# Patient Record
Sex: MALE | Race: ASIAN | NOT HISPANIC OR LATINO | Employment: FULL TIME | URBAN - METROPOLITAN AREA
[De-identification: names, ages, dates, MRNs, and addresses within clinical notes are randomized per-mention and may not be internally consistent; named-entity substitution may affect disease eponyms.]

---

## 2022-05-24 ENCOUNTER — OFFICE VISIT (OUTPATIENT)
Dept: INTERNAL MEDICINE | Facility: CLINIC | Age: 27
End: 2022-05-24
Payer: OTHER GOVERNMENT

## 2022-05-24 VITALS
BODY MASS INDEX: 28.24 KG/M2 | WEIGHT: 186.31 LBS | HEIGHT: 68 IN | HEART RATE: 80 BPM | TEMPERATURE: 97 F | SYSTOLIC BLOOD PRESSURE: 122 MMHG | OXYGEN SATURATION: 98 % | DIASTOLIC BLOOD PRESSURE: 88 MMHG

## 2022-05-24 DIAGNOSIS — M62.89 MUSCLE TIGHTNESS: ICD-10-CM

## 2022-05-24 DIAGNOSIS — Z87.438 HISTORY OF TORSION OF TESTIS: ICD-10-CM

## 2022-05-24 DIAGNOSIS — N50.811 RIGHT TESTICULAR PAIN: ICD-10-CM

## 2022-05-24 DIAGNOSIS — M54.50 CHRONIC RIGHT-SIDED LOW BACK PAIN WITHOUT SCIATICA: Primary | ICD-10-CM

## 2022-05-24 DIAGNOSIS — G89.29 CHRONIC RIGHT-SIDED LOW BACK PAIN WITHOUT SCIATICA: Primary | ICD-10-CM

## 2022-05-24 PROCEDURE — 99204 PR OFFICE/OUTPT VISIT, NEW, LEVL IV, 45-59 MIN: ICD-10-PCS | Mod: S$PBB,,, | Performed by: FAMILY MEDICINE

## 2022-05-24 PROCEDURE — 99204 OFFICE O/P NEW MOD 45 MIN: CPT | Mod: S$PBB,,, | Performed by: FAMILY MEDICINE

## 2022-05-24 PROCEDURE — 99999 PR PBB SHADOW E&M-NEW PATIENT-LVL V: CPT | Mod: PBBFAC,,, | Performed by: FAMILY MEDICINE

## 2022-05-24 PROCEDURE — 99205 OFFICE O/P NEW HI 60 MIN: CPT | Mod: PBBFAC | Performed by: FAMILY MEDICINE

## 2022-05-24 PROCEDURE — 99999 PR PBB SHADOW E&M-NEW PATIENT-LVL V: ICD-10-PCS | Mod: PBBFAC,,, | Performed by: FAMILY MEDICINE

## 2022-05-24 RX ORDER — CELECOXIB 200 MG/1
200 CAPSULE ORAL DAILY
Qty: 90 CAPSULE | Refills: 0 | Status: SHIPPED | OUTPATIENT
Start: 2022-05-24 | End: 2022-08-02

## 2022-05-24 RX ORDER — CYCLOBENZAPRINE HCL 10 MG
10 TABLET ORAL 3 TIMES DAILY
Qty: 30 TABLET | Refills: 0 | Status: SHIPPED | OUTPATIENT
Start: 2022-05-24 | End: 2022-08-02

## 2022-05-24 NOTE — PROGRESS NOTES
Subjective:   Patient ID: Cyril Carlton is a 27 y.o. male.  Chief Complaint:  Establish Care    Presents for establishing care  Reports no significant past medical history  Currently not on any medications    Reports chronic right sided back tightness  Notes right foot fracture in freshman year of high school  Fracture was not surgically corrected  Reports being informed by other providers that prior immobilization led to foot being 2-5 degrees off axis  Believes that foot issues contribute to right side back tightness and pain when squatting  Denies issues with ADLs  Reports mild sporadic tingling on right posterior leg  Reports trying ibuprofen, tylenol and aleve without relief  Denies using any muscle relaxants or receiving other treatments for relief  Has not attempted physical therapy but is amenable    Also is concerned about right testicular torsion  Reports in November 2021 single episode of sharp right sided testicular pain at night  Reports waking up to severe pain with right testicle being flipped about vertical axis  Notes that pain was relieved after manual detorsion  Since that episode, reports intermittent brief episodes of testicular pain  Denies prior history of testicular torsion, hernias, varicocele or hydrocele  Denies hematuria or bloody penile discharge    No other concerns noted today      Current Outpatient Medications:     celecoxib (CELEBREX) 200 MG capsule, Take 1 capsule (200 mg total) by mouth once daily., Disp: 90 capsule, Rfl: 0    cyclobenzaprine (FLEXERIL) 10 MG tablet, Take 1 tablet (10 mg total) by mouth 3 (three) times daily., Disp: 30 tablet, Rfl: 0    Review of Systems   Constitutional: Negative for activity change, appetite change, chills, fatigue, fever and unexpected weight change.   HENT: Negative for congestion, ear discharge, ear pain, nosebleeds, postnasal drip, rhinorrhea, sinus pressure, sinus pain, sore throat and trouble swallowing.    Eyes: Negative for photophobia,  "pain, discharge and visual disturbance.   Respiratory: Negative for cough, chest tightness and shortness of breath.    Cardiovascular: Negative for chest pain.   Gastrointestinal: Negative for abdominal pain, blood in stool, constipation, diarrhea, nausea and vomiting.   Endocrine: Negative for polydipsia, polyphagia and polyuria.   Genitourinary: Positive for testicular pain. Negative for decreased urine volume, difficulty urinating, dysuria, enuresis, flank pain, frequency, genital sores, hematuria, penile discharge, penile pain, penile swelling, scrotal swelling and urgency.   Musculoskeletal: Positive for back pain and myalgias. Negative for arthralgias, gait problem and neck pain.   Skin: Negative for color change and rash.   Neurological: Negative for dizziness, tremors, seizures, facial asymmetry, weakness, light-headedness, numbness and headaches.   Psychiatric/Behavioral: Negative for dysphoric mood. The patient is not nervous/anxious.      Objective:   /88   Pulse 80   Temp 97.2 °F (36.2 °C)   Ht 5' 8" (1.727 m)   Wt 84.5 kg (186 lb 4.6 oz)   SpO2 98%   BMI 28.33 kg/m²     Physical Exam  Vitals reviewed. Exam conducted with a chaperone present.   Constitutional:       General: He is not in acute distress.     Appearance: Normal appearance. He is not ill-appearing.   Abdominal:      Hernia: There is no hernia in the left inguinal area or right inguinal area.   Genitourinary:     Penis: Normal.       Testes: Normal.         Right: Mass, tenderness, swelling, testicular hydrocele or varicocele not present.         Left: Mass, tenderness, swelling, testicular hydrocele or varicocele not present. Left testis is descended.      Epididymis:      Right: Normal.      Left: Normal.   Musculoskeletal:         General: Normal range of motion.      Cervical back: Normal range of motion.      Lumbar back: No spasms, tenderness or bony tenderness. Normal range of motion. Negative right straight leg raise test " and negative left straight leg raise test. No scoliosis.   Lymphadenopathy:      Lower Body: No right inguinal adenopathy. No left inguinal adenopathy.   Skin:     General: Skin is warm and dry.   Neurological:      Mental Status: He is alert.      Gait: Gait normal.       Assessment:       ICD-10-CM ICD-9-CM   1. Chronic right-sided low back pain without sciatica  M54.50 724.2    G89.29 338.29   2. Muscle tightness  M62.89 728.9   3. Right testicular pain  N50.811 608.9   4. History of torsion of testis  Z87.438 V13.29     Plan:   Chronic right-sided low back pain without sciatica  Muscle tightness  -     Ambulatory referral/consult to Physical/Occupational Therapy; Future; Expected date: 05/31/2022  -     celecoxib (CELEBREX) 200 MG capsule; Take 1 capsule (200 mg total) by mouth once daily.  Dispense: 90 capsule; Refill: 0  -     cyclobenzaprine (FLEXERIL) 10 MG tablet; Take 1 tablet (10 mg total) by mouth 3 (three) times daily.  Dispense: 30 tablet; Refill: 0  Symptoms related to muscle tightness but unlikely spasm related  Start Celebrex 200 mg daily  Start Flexeril 10 mg three times daily  Schedule referral to PT  Reassess in 4 weeks    Right testicular pain  History of torsion of testis  -     Ambulatory referral/consult to Urology; Future; Expected date: 05/31/2022  No abnormality reproducible on exam  Referral to urology    Return to clinic 4 weeks    Bobby Cabrera, MS4  5/24/22    I hereby acknowledge that I am relying upon documentation authored by a medical student working under my supervision and further I hereby attest that I have verified the student documentation or findings by personally performing the physical exam and medical decision making activities of the Evaluation and Management service to be billed.  Lon Thomas     45 minutes of total time spent on the encounter, which includes face to face time and non-face to face time preparing to see the patient (eg, review of tests), Obtaining  and/or reviewing separately obtained history, documenting clinical information in the electronic or other health record, independently interpreting results (not separately reported) and communicating results to the patient/family/caregiver, or Care coordination (not separately reported).

## 2022-06-01 ENCOUNTER — CLINICAL SUPPORT (OUTPATIENT)
Dept: REHABILITATION | Facility: HOSPITAL | Age: 27
End: 2022-06-01
Payer: OTHER GOVERNMENT

## 2022-06-01 DIAGNOSIS — M54.50 CHRONIC RIGHT-SIDED LOW BACK PAIN WITHOUT SCIATICA: ICD-10-CM

## 2022-06-01 DIAGNOSIS — G89.29 CHRONIC RIGHT-SIDED LOW BACK PAIN WITHOUT SCIATICA: ICD-10-CM

## 2022-06-01 DIAGNOSIS — M62.89 MUSCLE TIGHTNESS: ICD-10-CM

## 2022-06-01 PROCEDURE — 97110 THERAPEUTIC EXERCISES: CPT

## 2022-06-01 PROCEDURE — 97161 PT EVAL LOW COMPLEX 20 MIN: CPT

## 2022-06-01 NOTE — PLAN OF CARE
[facility]  Physical Therapy Initial Evaluation    Name: Cyril Carlton  Clinic Number: [id number]    Therapy Diagnosis:  Encounter Diagnoses   Name Primary?    Chronic right-sided low back pain without sciatica     Muscle tightness       Physician: Lon Thomas MD    Physician Orders: PT Eval and Treat  Medical Diagnosis from Referral: Back Pain   Evaluation Date: 6/1/2022  Authorization Period Expiration: 12/31/22  Plan of Care Expiration: 07/31/2022    Progress Update: 06/30/2021  FOTO: 1 / 3    Visit # / Visits authorized: 1 / 1      PRECAUTIONS: Standard Precautions     MD Follow-up: 06/15/2022    Time In: 0700  Time Out: 0745  Total Appointment Time (timed & untimed codes): 30 minutes    SUBJECTIVE   Date of onset:     History of current condition - Cyril is a 27 y.o. male whom reports back discomfort and ankle stiffness. Reports chronic right sided back tightness  Notes right foot fracture in freshman year of high school. Fracture was not surgically corrected. Reports being informed by other providers that prior immobilization led to foot being 2-5 degrees off axis. Believes that foot issues contribute to right side back tightness and pain when squatting     Prior Therapy: N/A  Social History: Pt lives with their family   Living Environment: Apt    ADLs unable to complete:    Gym/Home Equipment: Yes   Occupation: Pt is [employer]   Prior Level of Function: Independent with all ADLs  Current Level of Function: 75% of PLOF    Pain:  Current 1 /10, worst 7 /10, best 0 /10   Location: Low Back   Description: Aching, Grabbing, Tight and Deep  Aggravating Factors: Squatting   Easing Factors: Rest     ______________________________________________________  Medical History:   No past medical history on file.    Surgical History:   Cyril Carlton  has no past surgical history on file.    Medications:   Cyril has a current medication list which includes the following prescription(s):  celecoxib and cyclobenzaprine.    Allergies:   Review of patient's allergies indicates:  No Known Allergies     OBJECTIVE     (X= Not Tested)     RANGE OF MOTION:    Lumbar AROM/PROM Spine  Right  6/1/2022 Left    6/1/2022 Pain/Dysfunction with Movement Goal   Lumbar Flexion (60) 75%   100%  Initial:    Lumbar Extension (30) 75%   100%  Initial:    Lumbar Side Bending (25) 75% 75%  100%  Initial:    Lumbar Rotation 100% 100%  Pain Free  Initial:      Hip AROM/PROM Right  6/1/2022 Left  6/1/2022 Pain/Dysfunction with Movement Goal   Hip IR (45) 5 5  40  Initial:    Hip ER (45) 30 30  40  Initial:      NEURO SCREEN:    Neuro Testing Right  6/1/2022 Left  6/1/2022 Details   Reflexes  x x    Dermatomes Normal Normal    Myotome Deficits Normal Normal    Tone x x    Spasticity x x        FUNCTIONAL SCREEN:    Upper Extremity ROM Details STRENGTH Details   Shoulder WNL No pain  Grossly 5/5    Elbow WNL No Pain  Grossly 5/5    Hand/Wrist WNL No pain  Grossly 5/5      Lower Extremity STRENGTH Details   Hip Grossly 4/5    Knee Grossly /5    Foot/Ankle Grossly 4/5      Abdominal Strength STRENGTH Details   Pelvic Tilt 3/5    Double Leg Drop x    Plank x        JOINT MOBILITY:     Joint Motion Tested Right  (spine)  6/1/2022 Left   6/1/2022 Goal   Cervical Mobility: C1-2   Normal B   Cervical Moiblity: C3-7   Normal B   Thoracic Mobility: T1-12   Normal B   Lumbar Mobility: L1-5   Normal B   Sacral Mobility   Normal B       Sensation:  Sensation is intact to light touch    Palpation: Increased tone and tenderness noted with palpation to: right ankle     Gait Analysis: The patient ambulated with the following assistive device: none; the pt presents with the following gait abnormalities: decreased step length, jessica  Movement Analysis:   Functional Test  Outcome   Double Leg Squat Lacking dorsiflexion with lateral shift to left side. Knee valgus on right side   Single Leg Step Down [place] sign on right leg      Balance:  Balance:    RIGHT  6/1/2022 LEFT  6/1/2022 Goal   Closed x  60 seconds  Initial: R (), L ()   Tandem x x 20 seconds  Initial: R (), L ()   Single Leg x x 20 seconds  Initial: R (), L ()       FUNCTION:     CMS Impairment/Limitation/Restriction for FOTO Lumbar Survey    Therapist reviewed FOTO scores for Cyril Carlton on 6/1/2022.   FOTO documents entered into [employer] - see Media section.    Limitation Score: 26%         TREATMENT     Total Treatment time separate from Evaluation: (20) minutes    Cyril received therapeutic exercises to develop strength, endurance, ROM, flexibility, and posture for (10) minutes including: x = exercises performed     TherEx 6/1/2022    Recumbent Bike  next                     Plan for Next Visit: Address right ankle stiffness and glute med motor control in squat position      Home Exercises and Patient Education Provided    Education/Self-Care provided: (included in treatment) minutes    Patient educated on the impairments noted above and the effects of physical therapy intervention to improve overall condition and QOL.    Patient was educated on all the above exercise prior/during/after for proper posture, positioning, and execution for safe performance with home exercise program.   Exercise Prescription:   o 6/1/2022: EVAL- Low     Written Home Exercises Provided: yes. Prefers: Electronic Copies  Exercises were reviewed and Cyril was able to demonstrate them prior to the end of the session.  Cyril demonstrated good understanding of the education provided.     See EMR under Patient Instructions for exercises provided during therapy sessions.    ASSESSMENT     Cyril is a 27 y.o. male referred to outpatient Physical Therapy with a medical diagnosis of right ankle stiffness and QL tightness.  Cyril presents with clinical signs and symptoms that support this diagnosis with decreased lumbar/ankle ROM, decreased lower extremity strength,  lower extremity neural tension, and impaired  "functional mobility.  The above impairments will be addressed through manual therapy techniques, therapeutic exercises, functional training, and modalities as necessary. Patient was treated and educated on exercises for home program, progression of therapy, and benefits of therapy to achieve full functional mobility.     Pt prognosis is Excellent.   Pt will benefit from skilled outpatient Physical Therapy to address the deficits stated above and in the chart below, provide pt/family education, and to maximize pt's level of independence.     Plan of care discussed with patient: Yes  Pt's spiritual, cultural and educational needs considered and patient is agreeable to the plan of care and goals as stated below:     Anticipated Barriers for therapy: occupation    Medical Necessity is demonstrated by the following  History  Co-morbidities and personal factors that may impact the plan of care Co-morbidities:       Personal Factors:   no deficits     low   Examination  Body Structures and Functions, activity limitations and participation restrictions that may impact the plan of care Body Regions:   back  lower extremities    Body Systems:    gross symmetry  ROM  strength  gross coordinated movement  balance  motor control    Participation Restrictions:   See above in "Current Level of Function"     Activity limitations:   Learning and applying knowledge  no deficits    General Tasks and Commands  no deficits    Communication  no deficits    Mobility  lifting and carrying objects    Self care  dressing    Domestic Life  assisting others    Interactions/Relationships  no deficits    Life Areas  no deficits    Community and Social Life  community life  recreation and leisure         low   Clinical Presentation stable and uncomplicated low   Decision Making/ Complexity Score: low       GOALS:  SHORT TERM GOALS: 4 weeks, (06/30/22) 6/1/2022   1. Recent signs and systems trend is improving in order to progress towards LTG's.  "   2. Patient will be independent with HEP in order to further progress and return to maximal function.    3. Pain rating at Worst: 5/10 in order to progress towards increased independence with activity.    4. Patient will be able to correct postural deviations in sitting and standing, to decrease pain and promote postural awareness for injury prevention.       LONG TERM GOALS: 8 weeks, (07/30/22) 6/1/2022   1. Patient will return to normal ADL, recreational, and work related activities with less pain and limitation.     2. Patient will improve AROM to stated goals in order to return to maximal functional potential.     3. Patient will improve Strength to stated goals of appropriate musculature in order to improve functional independence.     4. Pain Rating at Best: 1/10 to improve Quality of Life.     5. Patient will meet predicted functional outcome (FOTO) score: 18% to increase self-worth & perceived functional ability.    6. Patient will have met/partially met personal goal of: being able to squat > 225lbs without back discomfort         PLAN   Plan of care Certification: 6/1/2022 to 07/30/2022    Outpatient Physical Therapy 2 times weekly for 8 weeks to include any combination of the following interventions: virtual visits, dry needling, modalities, electrical stimulation (IFC, Pre-Mod, Attended with Functional Dry Needling), Manual Therapy, Moist Heat/ Ice, Neuromuscular Re-ed, Patient Education, Self Care, Therapeutic Exercise, Functional Training and Therapeutic Activites     Thank you for this referral.    These services are reasonable and necessary for the conditions set forth above while under my care.    Shayne Brody, PT, DPT

## 2022-06-07 ENCOUNTER — CLINICAL SUPPORT (OUTPATIENT)
Dept: REHABILITATION | Facility: HOSPITAL | Age: 27
End: 2022-06-07
Payer: OTHER GOVERNMENT

## 2022-06-07 DIAGNOSIS — M62.89 MUSCLE TIGHTNESS: Primary | ICD-10-CM

## 2022-06-07 PROCEDURE — 97110 THERAPEUTIC EXERCISES: CPT

## 2022-06-07 NOTE — PROGRESS NOTES
[facility]   Physical Therapy Treatment Note     Name: Cyril Carlton  Clinic Number: [id number]    Therapy Diagnosis:   Encounter Diagnosis   Name Primary?    Muscle tightness Yes     Physician: Lon Thomas MD    Visit Date: 6/7/2022    Physician Orders: PT Eval and Treat  Medical Diagnosis from Referral: Back Pain   Evaluation Date: 6/1/2022  Authorization Period Expiration: 9/19/22  Plan of Care Expiration: 07/31/2022                          Progress Update: 06/30/2021                      FOTO: 1 / 3    Visit # / Visits authorized: 1/20 (+1 eval)     Time In: 4:20pm  Time Out: 5:25pm  Total Billable Time: 56 minutes    Precautions: Standard    SUBJECTIVE     Pt reports: He has been working on how he is lifting and he has been a little more sore in the back and hips but overall he feels good.  He was compliant with home exercise program.  Response to previous treatment: good  Functional change: none noted    Pain:  Current 1 /10, worst 7 /10, best 0 /10   Location: Low Back     OBJECTIVE     Objective Measures updated at progress report unless specified.     TREATMENT     Cyril received the treatments listed below:  All exercises performed bilaterally     Cyril received therapeutic exercises to develop strength, endurance, ROM, flexibility, posture and core stabilization for 56 minutes including:  Elliptical 5 minutes level 5   Patty Pose and Half Kneeling Dorsifleion Stretch [id number]  Lateral Walks & Monster Walks green band 3x10  Step Taps 6inch 3x10  Bridge on swiss ball 3x10  Side Plank with Abduction 3x5   Trunk Rot Iso with Walkouts 3x5 30# each way   Single Leg MOBO with 5# KB 30x even and odd   Bird/Dog 2x10   Single Leg RDL to cone 2x10  Lumbar Flexion Stretch 3 way with swiss ball [id number] each way     Patient Education and Home Exercises     Home Exercises and Patient Education Provided     Education/Self-Care provided: (included in treatment) minutes   · Patient  educated on the impairments noted above and the effects of physical therapy intervention to improve overall condition and QOL.   · Patient was educated on all the above exercise prior/during/after for proper posture, positioning, and execution for safe performance with home exercise program.  · Exercise Prescription:   ? 6/1/2022: EVAL- Low      Written Home Exercises Provided: yes. Prefers: Electronic Copies  Exercises were reviewed and Cyril was able to demonstrate them prior to the end of the session.  Cyril demonstrated good understanding of the education provided.      See EMR under Patient Instructions for exercises provided during therapy sessions.    ASSESSMENT     Movements were added today for single leg stability, muscle control, and core/hip strengthening. Patient did have more fatigue on the right leg compared to the left. The right leg also has more valgus than the left with step taps. He tolerated today's session well. He was advised with being patient with gaining muscle control and not over doing anything at the gym. HE was also educated on doing more single leg and single arm movements to work more on stability.     Cyril Is progressing well towards his goals.   Pt prognosis is Excellent.     Pt will continue to benefit from skilled outpatient physical therapy to address the deficits listed in the problem list box on initial evaluation, provide pt/family education and to maximize pt's level of independence in the home and community environment.     Pt's spiritual, cultural and educational needs considered and pt agreeable to plan of care and goals.     Anticipated barriers to physical therapy: occupation    GOALS:  SHORT TERM GOALS: 4 weeks, (06/30/22) 6/1/2022   1. Recent signs and systems trend is improving in order to progress towards LTG's.     2. Patient will be independent with HEP in order to further progress and return to maximal function.     3. Pain rating at Worst: 5/10 in order to  progress towards increased independence with activity.     4. Patient will be able to correct postural deviations in sitting and standing, to decrease pain and promote postural awareness for injury prevention.         LONG TERM GOALS: 8 weeks, (07/30/22) 6/1/2022   1. Patient will return to normal ADL, recreational, and work related activities with less pain and limitation.      2. Patient will improve AROM to stated goals in order to return to maximal functional potential.      3. Patient will improve Strength to stated goals of appropriate musculature in order to improve functional independence.      4. Pain Rating at Best: 1/10 to improve Quality of Life.      5. Patient will meet predicted functional outcome (FOTO) score: 18% to increase self-worth & perceived functional ability.     6. Patient will have met/partially met personal goal of: being able to squat > 225lbs without back discomfort         PLAN     Continue with physical therapy as planned.     Plan of care Certification: 6/1/2022 to 07/30/2022    Lon Vizcarra, PT, DPT

## 2022-06-21 ENCOUNTER — OFFICE VISIT (OUTPATIENT)
Dept: UROLOGY | Facility: CLINIC | Age: 27
End: 2022-06-21
Payer: OTHER GOVERNMENT

## 2022-06-21 VITALS
BODY MASS INDEX: 28.74 KG/M2 | DIASTOLIC BLOOD PRESSURE: 84 MMHG | WEIGHT: 189.63 LBS | HEART RATE: 67 BPM | HEIGHT: 68 IN | SYSTOLIC BLOOD PRESSURE: 122 MMHG

## 2022-06-21 DIAGNOSIS — N50.811 RIGHT TESTICULAR PAIN: ICD-10-CM

## 2022-06-21 DIAGNOSIS — Z87.438 HISTORY OF TORSION OF TESTIS: ICD-10-CM

## 2022-06-21 PROCEDURE — 99999 PR PBB SHADOW E&M-EST. PATIENT-LVL III: ICD-10-PCS | Mod: PBBFAC,,, | Performed by: UROLOGY

## 2022-06-21 PROCEDURE — 99999 PR PBB SHADOW E&M-EST. PATIENT-LVL III: CPT | Mod: PBBFAC,,, | Performed by: UROLOGY

## 2022-06-21 PROCEDURE — 99243 PR OFFICE CONSULTATION,LEVEL III: ICD-10-PCS | Mod: S$PBB,,, | Performed by: UROLOGY

## 2022-06-21 PROCEDURE — 99243 OFF/OP CNSLTJ NEW/EST LOW 30: CPT | Mod: S$PBB,,, | Performed by: UROLOGY

## 2022-06-21 PROCEDURE — 99213 OFFICE O/P EST LOW 20 MIN: CPT | Mod: PBBFAC | Performed by: UROLOGY

## 2022-06-22 ENCOUNTER — CLINICAL SUPPORT (OUTPATIENT)
Dept: REHABILITATION | Facility: HOSPITAL | Age: 27
End: 2022-06-22
Payer: OTHER GOVERNMENT

## 2022-06-22 DIAGNOSIS — M62.89 MUSCLE TIGHTNESS: Primary | ICD-10-CM

## 2022-06-22 PROCEDURE — 97140 MANUAL THERAPY 1/> REGIONS: CPT

## 2022-06-22 PROCEDURE — 97110 THERAPEUTIC EXERCISES: CPT

## 2022-06-22 NOTE — PROGRESS NOTES
[facility]   Physical Therapy Treatment Note     Name: Cyril Carlton  Clinic Number: [id number]    Therapy Diagnosis:   Encounter Diagnosis   Name Primary?    Muscle tightness Yes     Physician: Lon Thomas MD  Visit Date: 6/22/2022  Physician Orders: PT Eval and Treat  Medical Diagnosis from Referral: Back Pain   Evaluation Date: 6/1/2022  Authorization Period Expiration: 9/19/22  Plan of Care Expiration: 07/31/2022                          Progress Update: 06/30/2021                      FOTO: 1 / 3    Visit # / Visits authorized: 2/20 (+1 eval)     Time In: 0815  Time Out: 0910  Total Billable Time: 55 minutes    Precautions: Standard    SUBJECTIVE     Pt reports: overall he feels good. Unable to workout due to traveling schedule  He was compliant with home exercise program.  Response to previous treatment: good  Functional change: none noted    Pain:  Current 1 /10, worst 7 /10, best 0 /10   Location: Low Back     OBJECTIVE     Objective Measures updated at progress report unless specified.     TREATMENT     Cyril received the treatments listed below:  All exercises performed bilaterally     Cyril received therapeutic exercises to develop strength, endurance, ROM, flexibility, posture and core stabilization for 40 minutes including:  Elliptical 5 minutes level 5   Patty Pose and Half Kneeling Dorsifleion Stretch [id number]  Lateral Walks & Monster Walks green band 3x10  Step Taps 6inch 3x10  Bridge on swiss ball 3x10  Side Plank with Abduction 3x5   Tempo squats 3x5    HELD  Trunk Rot Iso with Walkouts 3x5 30# each way   Single Leg MOBO with 5# KB 30x even and odd   Bird/Dog 2x10   Single Leg RDL to cone 2x10  Lumbar Flexion Stretch 3 way with swiss ball 3x0pqve each way       Cyril received the following manual therapy techniques: Joint mobilizations, Manual traction, Myofacial release, Soft tissue Mobilization and Friction Massage were applied to the: hips and QL for 15  minutes, including:  FDN- QL    Patient Education and Home Exercises     Home Exercises and Patient Education Provided     Education/Self-Care provided: (included in treatment) minutes   · Patient educated on the impairments noted above and the effects of physical therapy intervention to improve overall condition and QOL.   · Patient was educated on all the above exercise prior/during/after for proper posture, positioning, and execution for safe performance with home exercise program.  · Exercise Prescription:   ? 6/1/2022: FELICITAS- Low      Written Home Exercises Provided: yes. Prefers: Electronic Copies  Exercises were reviewed and Cyril was able to demonstrate them prior to the end of the session.  Cyril demonstrated good understanding of the education provided.      See EMR under Patient Instructions for exercises provided during therapy sessions.    ASSESSMENT     Cyril Carlton tolerated PT session well with minimal  complaints of pain or discomfort, secondary to poor motor coordination. Objective findings are improving with measurements of ROM and functional mobility.  Therapy exercises were reviewed by revisiting exercises given from previous home exercise program while adding tempo squats.  Handouts were not issued during today's visit. Cyril demonstrated good understanding of new exercises and will continue to progress at home until next follow-up.         Cyril Is progressing well towards his goals.   Pt prognosis is Excellent.     Pt will continue to benefit from skilled outpatient physical therapy to address the deficits listed in the problem list box on initial evaluation, provide pt/family education and to maximize pt's level of independence in the home and community environment.     Pt's spiritual, cultural and educational needs considered and pt agreeable to plan of care and goals.     Anticipated barriers to physical therapy: occupation    GOALS:  SHORT TERM GOALS: 4 weeks, (06/30/22) 6/1/2022    1. Recent signs and systems trend is improving in order to progress towards LTG's.     2. Patient will be independent with HEP in order to further progress and return to maximal function.     3. Pain rating at Worst: 5/10 in order to progress towards increased independence with activity.     4. Patient will be able to correct postural deviations in sitting and standing, to decrease pain and promote postural awareness for injury prevention.         LONG TERM GOALS: 8 weeks, (07/30/22) 6/1/2022   1. Patient will return to normal ADL, recreational, and work related activities with less pain and limitation.      2. Patient will improve AROM to stated goals in order to return to maximal functional potential.      3. Patient will improve Strength to stated goals of appropriate musculature in order to improve functional independence.      4. Pain Rating at Best: 1/10 to improve Quality of Life.      5. Patient will meet predicted functional outcome (FOTO) score: 18% to increase self-worth & perceived functional ability.     6. Patient will have met/partially met personal goal of: being able to squat > 225lbs without back discomfort         PLAN     Continue with physical therapy as planned.     Plan of care Certification: 6/1/2022 to 07/30/2022    Shayne Brody, PT, DPT

## 2022-06-24 ENCOUNTER — HOSPITAL ENCOUNTER (OUTPATIENT)
Dept: RADIOLOGY | Facility: HOSPITAL | Age: 27
Discharge: HOME | End: 2022-06-24
Attending: UROLOGY
Payer: OTHER GOVERNMENT

## 2022-06-24 DIAGNOSIS — N50.811 RIGHT TESTICULAR PAIN: ICD-10-CM

## 2022-06-24 PROCEDURE — 76870 US EXAM SCROTUM: CPT | Mod: TC

## 2022-06-25 NOTE — PROGRESS NOTES
Chief Complaint:  Right testicular pain    HPI:   Mr. Cyril Carlton is a 27 y.o. male that presents today as a referral from Dr. Thomas for right testicular pain.  Patient notes a testicular pain which initially occurred about 1.5yrs ago.  States that he woke up with severe pain in his right testicle and noted that the testicle was a flipped vertically.  States that he manually detorsed.  This improved his pain.  He notes intermittent discomfort now, related to working out.  At worst 2-3/10.  Denies any voiding issues or gross hematuria.  Denies ED.  Denies family history of  cancers.  No recent imaging.  Currently pain-free.      PMH:  Past Medical History:   Diagnosis Date    Patient denies medical problems        PSH:  Past Surgical History:   Procedure Laterality Date    NO PAST SURGERIES         Family History:  Family History   Problem Relation Age of Onset    No Known Problems Mother     No Known Problems Father     No Known Problems Sister     Diabetes Maternal Grandmother     Diabetes Paternal Grandmother     Diabetes Paternal Grandfather        Social History:  Social History     Tobacco Use    Smoking status: Never Smoker    Smokeless tobacco: Never Used   Substance Use Topics    Alcohol use: Yes     Alcohol/week: 2.0 standard drinks     Types: 2 Cans of beer per week    Drug use: Never        Review of Systems:  General: No fever, chills  Skin: No rashes  Chest:  Denies cough and sputum production  Heart: Denies chest pain  Resp: Denies dyspnea  Abdomen: Denies diarrhea, abdominal pain, hematemesis, or blood in stool.  Musculoskeletal: No joint stiffness or swelling. Denies back pain.  : see HPI  Neuro: no dizziness or weakness    Allergies:  Patient has no known allergies.    Medications:    Current Outpatient Medications:     cyclobenzaprine (FLEXERIL) 10 MG tablet, Take 1 tablet (10 mg total) by mouth 3 (three) times daily., Disp: 30 tablet, Rfl: 0    celecoxib (CELEBREX) 200 MG  capsule, Take 1 capsule (200 mg total) by mouth once daily., Disp: 90 capsule, Rfl: 0    Physical Exam:  Vitals:    06/21/22 1501   BP: 122/84   Pulse: 67     Body mass index is 28.83 kg/m².  General: awake, alert, cooperative  Head: NC/AT  Ears: external ears normal  Eyes: sclera normal  Lungs: normal inspiration, NAD  Heart: well-perfused  Abdomen: Soft, NT, ND  : Normal circ'd phallus, meatus normal in size and position, BL testicles palpable, no masses, nontender, no abnormalities of epididymi  Lymphatic: groin nodes negative  Skin: The skin is warm and dry  Ext: No c/c/e.  Neuro: grossly intact, AOx3    RADIOLOGY:  No recent relevant imaging available for review.    LABS:  I personally reviewed the following lab values:  No results found for: WBC, HGB, HCT, PLT, NA, K, CL, CREATININE, BUN, CO2, TSH, PSA, INR, GLUF, HGBA1C, MICROALBUR, CHOL, TRIG, HDL, LDLDIRECT, ALT, AST    Assessment/Plan:   Mr. Cyril Carlton is a 27 y.o. male with episode of acute right-sided testicular pain which was concerning for torsion.  This resolved and has not recurred since.  Will obtain a scrotal ultrasound to ensure good flow to bilateral testicles.  Recommended urgent evaluation in the ED if that type of pain recurs.  For his more recent discomfort, recommend scrotal support and nonsteroidal anti-inflammatories.  One message with the scrotal ultrasound results.    Addendum - scrotal US shows good flow and normal testicles    Thank you for allowing me the opportunity to participate in this patient's care.     Rupert Freeman MD  Urology

## 2022-06-29 ENCOUNTER — CLINICAL SUPPORT (OUTPATIENT)
Dept: REHABILITATION | Facility: HOSPITAL | Age: 27
End: 2022-06-29
Payer: OTHER GOVERNMENT

## 2022-06-29 DIAGNOSIS — M62.89 MUSCLE TIGHTNESS: Primary | ICD-10-CM

## 2022-06-29 PROCEDURE — 97110 THERAPEUTIC EXERCISES: CPT

## 2022-06-29 PROCEDURE — 97140 MANUAL THERAPY 1/> REGIONS: CPT

## 2022-06-29 NOTE — PROGRESS NOTES
[facility]   Physical Therapy Treatment Note     Name: Cyril Carlton  Clinic Number: [id number]    Therapy Diagnosis:   Encounter Diagnosis   Name Primary?    Muscle tightness Yes     Physician: Lon Thomas MD  Visit Date: 6/29/2022  Physician Orders: PT Eval and Treat  Medical Diagnosis from Referral: Back Pain   Evaluation Date: 6/1/2022  Authorization Period Expiration: 9/19/22  Plan of Care Expiration: 07/31/2022                          Progress Update: 06/30/2021                      FOTO: 1 / 3    Visit # / Visits authorized: 3/20 (+1 eval)     Time In: 0815  Time Out: 0900  Total Billable Time: 45 minutes    Precautions: Standard    SUBJECTIVE     Pt reports: overall he feels good. Unable to workout due to traveling schedule  He was compliant with home exercise program.  Response to previous treatment: good  Functional change: none noted    Pain:  Current 1 /10, worst 7 /10, best 0 /10   Location: Low Back     OBJECTIVE     Objective Measures updated at progress report unless specified.     TREATMENT     Cyril received the treatments listed below:  All exercises performed bilaterally     Cyril received therapeutic exercises to develop strength, endurance, ROM, flexibility, posture and core stabilization for 25 minutes including:    Elliptical 5 minutes level 5   Patty Pose and Half Kneeling Dorsifleion Stretch [id number]  Step Taps 6inch 3x10  Bridge on swiss ball 3x10  Side Plank with Abduction 3x5   Tempo squats 3x5  Bird/Dog 2x10    HELD  Trunk Rot Iso with Walkouts 3x5 30# each way   Single Leg MOBO with 5# KB 30x even and odd   Single Leg RDL to cone 2x10  Lumbar Flexion Stretch 3 way with swiss ball [id number] each way   Lateral Walks & Monster Walks green band 3x10    Cyril received the following manual therapy techniques: Joint mobilizations, Manual traction, Myofacial release, Soft tissue Mobilization and Friction Massage were applied to the: hips and QL for 15 minutes,  including:  Soft tissue and joint mobs  FDN- QL    Patient Education and Home Exercises     Home Exercises and Patient Education Provided     Education/Self-Care provided: (included in treatment) minutes   · Patient educated on the impairments noted above and the effects of physical therapy intervention to improve overall condition and QOL.   · Patient was educated on all the above exercise prior/during/after for proper posture, positioning, and execution for safe performance with home exercise program.  · Exercise Prescription:   ? 6/1/2022: FELICITAS- Low      Written Home Exercises Provided: yes. Prefers: Electronic Copies  Exercises were reviewed and Cyril was able to demonstrate them prior to the end of the session.  Cyril demonstrated good understanding of the education provided.      See EMR under Patient Instructions for exercises provided during therapy sessions.    ASSESSMENT     Cyril Carlton tolerated PT session well with minimal  complaints of pain or discomfort, secondary to poor motor coordination. Objective findings are improving with measurements of ROM and functional mobility.  Therapy exercises were reviewed by revisiting exercises given from previous home exercise program while adding tempo squats.  Handouts were not issued during today's visit. Cyril demonstrated good understanding of new exercises and will continue to progress at home until next follow-up.         Cyril Is progressing well towards his goals.   Pt prognosis is Excellent.     Pt will continue to benefit from skilled outpatient physical therapy to address the deficits listed in the problem list box on initial evaluation, provide pt/family education and to maximize pt's level of independence in the home and community environment.     Pt's spiritual, cultural and educational needs considered and pt agreeable to plan of care and goals.     Anticipated barriers to physical therapy: occupation    GOALS:  SHORT TERM GOALS: 4 weeks, (06/30/22)  6/1/2022   1. Recent signs and systems trend is improving in order to progress towards LTG's.     2. Patient will be independent with HEP in order to further progress and return to maximal function.     3. Pain rating at Worst: 5/10 in order to progress towards increased independence with activity.     4. Patient will be able to correct postural deviations in sitting and standing, to decrease pain and promote postural awareness for injury prevention.         LONG TERM GOALS: 8 weeks, (07/30/22) 6/1/2022   1. Patient will return to normal ADL, recreational, and work related activities with less pain and limitation.      2. Patient will improve AROM to stated goals in order to return to maximal functional potential.      3. Patient will improve Strength to stated goals of appropriate musculature in order to improve functional independence.      4. Pain Rating at Best: 1/10 to improve Quality of Life.      5. Patient will meet predicted functional outcome (FOTO) score: 18% to increase self-worth & perceived functional ability.     6. Patient will have met/partially met personal goal of: being able to squat > 225lbs without back discomfort         PLAN     Continue with physical therapy as planned.     Plan of care Certification: 6/1/2022 to 07/30/2022    Shayne Brody, PT, DPT

## 2022-08-01 ENCOUNTER — TELEPHONE (OUTPATIENT)
Dept: INTERNAL MEDICINE | Facility: CLINIC | Age: 27
End: 2022-08-01
Payer: OTHER GOVERNMENT

## 2022-08-01 NOTE — TELEPHONE ENCOUNTER
Spoke with the pt on 8/1/22 in regards to the pt running late for his appt today. He decided to reschedule until tomorrow I was bale to get him scheduleD.\\AR

## 2022-08-02 ENCOUNTER — OFFICE VISIT (OUTPATIENT)
Dept: INTERNAL MEDICINE | Facility: CLINIC | Age: 27
End: 2022-08-02
Payer: OTHER GOVERNMENT

## 2022-08-02 ENCOUNTER — LAB VISIT (OUTPATIENT)
Dept: LAB | Facility: HOSPITAL | Age: 27
End: 2022-08-02
Attending: FAMILY MEDICINE
Payer: OTHER GOVERNMENT

## 2022-08-02 VITALS
SYSTOLIC BLOOD PRESSURE: 118 MMHG | OXYGEN SATURATION: 99 % | HEIGHT: 68 IN | TEMPERATURE: 97 F | WEIGHT: 188.69 LBS | BODY MASS INDEX: 28.6 KG/M2 | HEART RATE: 75 BPM | DIASTOLIC BLOOD PRESSURE: 76 MMHG

## 2022-08-02 DIAGNOSIS — Z00.00 ANNUAL PHYSICAL EXAM: ICD-10-CM

## 2022-08-02 DIAGNOSIS — M54.50 CHRONIC RIGHT-SIDED LOW BACK PAIN WITHOUT SCIATICA: ICD-10-CM

## 2022-08-02 DIAGNOSIS — Z11.59 NEED FOR HEPATITIS C SCREENING TEST: ICD-10-CM

## 2022-08-02 DIAGNOSIS — Z00.00 ANNUAL PHYSICAL EXAM: Primary | ICD-10-CM

## 2022-08-02 DIAGNOSIS — Z11.4 SCREENING FOR HIV (HUMAN IMMUNODEFICIENCY VIRUS): ICD-10-CM

## 2022-08-02 DIAGNOSIS — M62.89 MUSCLE TIGHTNESS: ICD-10-CM

## 2022-08-02 DIAGNOSIS — G89.29 CHRONIC RIGHT-SIDED LOW BACK PAIN WITHOUT SCIATICA: ICD-10-CM

## 2022-08-02 LAB
ALBUMIN SERPL BCP-MCNC: 4.5 G/DL (ref 3.5–5.2)
ALP SERPL-CCNC: 51 U/L (ref 55–135)
ALT SERPL W/O P-5'-P-CCNC: 34 U/L (ref 10–44)
ANION GAP SERPL CALC-SCNC: 10 MMOL/L (ref 8–16)
AST SERPL-CCNC: 35 U/L (ref 10–40)
BILIRUB SERPL-MCNC: 3 MG/DL (ref 0.1–1)
BUN SERPL-MCNC: 12 MG/DL (ref 6–20)
CALCIUM SERPL-MCNC: 9.6 MG/DL (ref 8.7–10.5)
CHLORIDE SERPL-SCNC: 103 MMOL/L (ref 95–110)
CHOLEST SERPL-MCNC: 187 MG/DL (ref 120–199)
CHOLEST/HDLC SERPL: 2.3 {RATIO} (ref 2–5)
CO2 SERPL-SCNC: 27 MMOL/L (ref 23–29)
CREAT SERPL-MCNC: 1.3 MG/DL (ref 0.5–1.4)
ERYTHROCYTE [DISTWIDTH] IN BLOOD BY AUTOMATED COUNT: 12.2 % (ref 11.5–14.5)
EST. GFR  (NO RACE VARIABLE): >60 ML/MIN/1.73 M^2
ESTIMATED AVG GLUCOSE: 94 MG/DL (ref 68–131)
GLUCOSE SERPL-MCNC: 91 MG/DL (ref 70–110)
HBA1C MFR BLD: 4.9 % (ref 4–5.6)
HCT VFR BLD AUTO: 47.9 % (ref 40–54)
HDLC SERPL-MCNC: 81 MG/DL (ref 40–75)
HDLC SERPL: 43.3 % (ref 20–50)
HGB BLD-MCNC: 15.9 G/DL (ref 14–18)
LDLC SERPL CALC-MCNC: 90.4 MG/DL (ref 63–159)
MCH RBC QN AUTO: 28.4 PG (ref 27–31)
MCHC RBC AUTO-ENTMCNC: 33.2 G/DL (ref 32–36)
MCV RBC AUTO: 86 FL (ref 82–98)
NONHDLC SERPL-MCNC: 106 MG/DL
PLATELET # BLD AUTO: 201 K/UL (ref 150–450)
PMV BLD AUTO: 12 FL (ref 9.2–12.9)
POTASSIUM SERPL-SCNC: 4.5 MMOL/L (ref 3.5–5.1)
PROT SERPL-MCNC: 7.4 G/DL (ref 6–8.4)
RBC # BLD AUTO: 5.59 M/UL (ref 4.6–6.2)
SODIUM SERPL-SCNC: 140 MMOL/L (ref 136–145)
TRIGL SERPL-MCNC: 78 MG/DL (ref 30–150)
TSH SERPL DL<=0.005 MIU/L-ACNC: 0.8 UIU/ML (ref 0.4–4)
WBC # BLD AUTO: 6.34 K/UL (ref 3.9–12.7)

## 2022-08-02 PROCEDURE — 80061 LIPID PANEL: CPT | Performed by: FAMILY MEDICINE

## 2022-08-02 PROCEDURE — 99999 PR PBB SHADOW E&M-EST. PATIENT-LVL IV: ICD-10-PCS | Mod: PBBFAC,,, | Performed by: FAMILY MEDICINE

## 2022-08-02 PROCEDURE — 99395 PR PREVENTIVE VISIT,EST,18-39: ICD-10-PCS | Mod: S$PBB,,, | Performed by: FAMILY MEDICINE

## 2022-08-02 PROCEDURE — 99214 OFFICE O/P EST MOD 30 MIN: CPT | Mod: PBBFAC | Performed by: FAMILY MEDICINE

## 2022-08-02 PROCEDURE — 83036 HEMOGLOBIN GLYCOSYLATED A1C: CPT | Performed by: FAMILY MEDICINE

## 2022-08-02 PROCEDURE — 84443 ASSAY THYROID STIM HORMONE: CPT | Performed by: FAMILY MEDICINE

## 2022-08-02 PROCEDURE — 36415 COLL VENOUS BLD VENIPUNCTURE: CPT | Performed by: FAMILY MEDICINE

## 2022-08-02 PROCEDURE — 85027 COMPLETE CBC AUTOMATED: CPT | Performed by: FAMILY MEDICINE

## 2022-08-02 PROCEDURE — 80053 COMPREHEN METABOLIC PANEL: CPT | Performed by: FAMILY MEDICINE

## 2022-08-02 PROCEDURE — 99999 PR PBB SHADOW E&M-EST. PATIENT-LVL IV: CPT | Mod: PBBFAC,,, | Performed by: FAMILY MEDICINE

## 2022-08-02 PROCEDURE — 86803 HEPATITIS C AB TEST: CPT | Performed by: FAMILY MEDICINE

## 2022-08-02 PROCEDURE — 99395 PREV VISIT EST AGE 18-39: CPT | Mod: S$PBB,,, | Performed by: FAMILY MEDICINE

## 2022-08-02 PROCEDURE — 87389 HIV-1 AG W/HIV-1&-2 AB AG IA: CPT | Performed by: FAMILY MEDICINE

## 2022-08-02 RX ORDER — COVID-19 VACCINE, MRNA 0.2 MG/ML
INJECTION, SUSPENSION INTRAMUSCULAR
COMMUNITY
Start: 2022-02-08 | End: 2022-08-02

## 2022-08-02 NOTE — PROGRESS NOTES
Subjective:   Patient ID: Cyril Carlton is a 27 y.o. male.  Chief Complaint:  Follow-up    Presents for annual physical exam and follow-up on muscle tightness/low back pain    Last visit May 2022  Took Celebrex, Flexeril, and completed course physical therapy  Significant improvement in lower leg and hip symptoms, but no significant improvement in right-sided back symptoms  Questions if he needs to see a chiropractor    Otherwise no significant past medical history  No family history colon or prostate cancer  No active  symptoms, evaluated by Urology for some recurrent inguinal and scrotal pain which has improved and ultrasound was normal  Tetanus booster up-to-date  COVID vaccine booster up-to-date  No recent flu vaccine  No previous hepatitis-C or HIV screens on file    Other than back issue, no additional complaints today        No current outpatient medications on file.    Review of Systems   Constitutional: Negative for activity change, appetite change, chills, fatigue, fever and unexpected weight change.   HENT: Negative for congestion, dental problem, ear discharge, ear pain, nosebleeds, postnasal drip, rhinorrhea, sinus pressure, sinus pain, sore throat and trouble swallowing.    Eyes: Negative for photophobia, pain, discharge, redness and visual disturbance.   Respiratory: Negative for cough, chest tightness, shortness of breath and wheezing.    Cardiovascular: Negative for chest pain, palpitations and leg swelling.   Gastrointestinal: Negative for abdominal pain, blood in stool, constipation, diarrhea, nausea and vomiting.   Endocrine: Negative for polydipsia, polyphagia and polyuria.   Genitourinary: Negative for decreased urine volume, difficulty urinating, dysuria, enuresis, flank pain, frequency, genital sores, hematuria, penile discharge, penile pain, penile swelling, scrotal swelling, testicular pain and urgency.   Musculoskeletal: Positive for back pain and myalgias. Negative for arthralgias, gait  "problem and neck pain.   Skin: Negative for color change and rash.   Neurological: Negative for dizziness, tremors, seizures, facial asymmetry, weakness, light-headedness, numbness and headaches.   Hematological: Negative for adenopathy.   Psychiatric/Behavioral: Negative for dysphoric mood and sleep disturbance. The patient is not nervous/anxious.      Objective:   /76 (BP Location: Left arm, Patient Position: Sitting, BP Method: Large (Manual))   Pulse 75   Temp 97.2 °F (36.2 °C) (Tympanic)   Ht 5' 8" (1.727 m)   Wt 85.6 kg (188 lb 11.4 oz)   SpO2 99%   BMI 28.69 kg/m²     Physical Exam  Vitals and nursing note reviewed.   Constitutional:       Appearance: Normal appearance. He is well-developed and overweight.   HENT:      Right Ear: Hearing, tympanic membrane, ear canal and external ear normal.      Left Ear: Hearing, tympanic membrane, ear canal and external ear normal.      Nose: No mucosal edema, congestion or rhinorrhea.      Right Turbinates: Not enlarged or swollen.      Left Turbinates: Not enlarged or swollen.      Right Sinus: No maxillary sinus tenderness or frontal sinus tenderness.      Left Sinus: No maxillary sinus tenderness or frontal sinus tenderness.      Mouth/Throat:      Mouth: No oral lesions.      Pharynx: Oropharynx is clear. Uvula midline.      Tonsils: No tonsillar exudate.   Eyes:      General: No scleral icterus.        Right eye: No discharge.         Left eye: No discharge.      Conjunctiva/sclera: Conjunctivae normal.      Right eye: Right conjunctiva is not injected.      Left eye: Left conjunctiva is not injected.   Neck:      Thyroid: No thyroid mass, thyromegaly or thyroid tenderness.      Vascular: No JVD.   Cardiovascular:      Rate and Rhythm: Normal rate and regular rhythm.      Pulses:           Radial pulses are 2+ on the right side and 2+ on the left side.      Heart sounds: Normal heart sounds. No murmur heard.    No friction rub. No gallop.   Pulmonary:     "  Effort: Pulmonary effort is normal. No accessory muscle usage or respiratory distress.      Breath sounds: Normal breath sounds.   Abdominal:      General: There is no distension.      Palpations: Abdomen is soft.      Tenderness: There is no abdominal tenderness.   Musculoskeletal:      Right lower leg: No edema.      Left lower leg: No edema.   Lymphadenopathy:      Cervical: No cervical adenopathy.   Skin:     Findings: No rash.   Neurological:      Mental Status: He is alert and oriented to person, place, and time.      Coordination: Coordination is intact.      Gait: Gait is intact.   Psychiatric:         Attention and Perception: Attention normal.         Mood and Affect: Mood normal.         Behavior: Behavior is cooperative.         Cognition and Memory: Cognition normal.       Assessment:       ICD-10-CM ICD-9-CM   1. Annual physical exam  Z00.00 V70.0   2. Need for hepatitis C screening test  Z11.59 V73.89   3. Screening for HIV (human immunodeficiency virus)  Z11.4 V73.89   4. Chronic right-sided low back pain without sciatica  M54.50 724.2    G89.29 338.29   5. Muscle tightness  M62.89 728.9     Plan:   Annual physical exam  Need for hepatitis C screening test  Screening for HIV (human immunodeficiency virus)  -     CBC Without Differential; Future; Expected date: 08/02/2022  -     Comprehensive Metabolic Panel; Future; Expected date: 08/02/2022  -     Lipid Panel; Future; Expected date: 08/02/2022  -     TSH; Future; Expected date: 08/02/2022  -     Hemoglobin A1C; Future; Expected date: 08/02/2022  -     HIV 1/2 Ag/Ab (4th Gen); Future; Expected date: 08/02/2022  -     Hepatitis C Antibody; Future; Expected date: 08/02/2022  Blood pressure normal.  BMI 27. Remainder exam stable.  Check labs.  Treat as indicated.  Colon cancer screening at 45 years old  Tetanus booster up-to-date  COVID vaccine booster up-to-date  Flu vaccine advised next season    Chronic right-sided low back pain without  sciatica  Muscle tightness  -     Ambulatory referral/consult to Back & Spine Clinic; Future; Expected date: 08/09/2022  -     Ambulatory referral/consult to Sports Medicine; Future; Expected date: 08/09/2022  With minimal response and back pain physical therapy, recommend additional evaluation  Referral order to Back and Spine clinic at the [place] to see physiatry.   Referral ordered to [facility] sports chiropractic and sports rehab for possible manipulation.    Return to clinic 1 year for annual physical exam or sooner as needed

## 2022-08-03 ENCOUNTER — TELEPHONE (OUTPATIENT)
Dept: PHYSICAL MEDICINE AND REHAB | Facility: CLINIC | Age: 27
End: 2022-08-03
Payer: OTHER GOVERNMENT

## 2022-08-03 ENCOUNTER — PATIENT MESSAGE (OUTPATIENT)
Dept: INTERNAL MEDICINE | Facility: CLINIC | Age: 27
End: 2022-08-03
Payer: OTHER GOVERNMENT

## 2022-08-03 LAB
HCV AB SERPL QL IA: NEGATIVE
HIV 1+2 AB+HIV1 P24 AG SERPL QL IA: NEGATIVE

## 2022-08-03 NOTE — TELEPHONE ENCOUNTER
LVM for patient to call back [phone] regarding appointment for Back and Spine referral.  Rani Gallego RN

## 2022-08-05 ENCOUNTER — TELEPHONE (OUTPATIENT)
Dept: PHYSICAL MEDICINE AND REHAB | Facility: CLINIC | Age: 27
End: 2022-08-05
Payer: OTHER GOVERNMENT

## 2022-08-05 ENCOUNTER — CLINICAL SUPPORT (OUTPATIENT)
Dept: REHABILITATION | Facility: HOSPITAL | Age: 27
End: 2022-08-05
Payer: OTHER GOVERNMENT

## 2022-08-05 DIAGNOSIS — M62.89 MUSCLE TIGHTNESS: Primary | ICD-10-CM

## 2022-08-05 PROCEDURE — 97140 MANUAL THERAPY 1/> REGIONS: CPT

## 2022-08-05 PROCEDURE — 97110 THERAPEUTIC EXERCISES: CPT

## 2022-08-05 NOTE — TELEPHONE ENCOUNTER
Returned patient's phone call for scheduling appointment with B&S.  Made appointment per referral. Patient v/u.    Rani Gallego RN

## 2022-08-08 ENCOUNTER — PATIENT MESSAGE (OUTPATIENT)
Dept: PHYSICAL MEDICINE AND REHAB | Facility: CLINIC | Age: 27
End: 2022-08-08

## 2022-08-08 ENCOUNTER — OFFICE VISIT (OUTPATIENT)
Dept: PHYSICAL MEDICINE AND REHAB | Facility: CLINIC | Age: 27
End: 2022-08-08
Payer: OTHER GOVERNMENT

## 2022-08-08 ENCOUNTER — HOSPITAL ENCOUNTER (OUTPATIENT)
Dept: RADIOLOGY | Facility: HOSPITAL | Age: 27
Discharge: HOME | End: 2022-08-08
Attending: PHYSICAL MEDICINE & REHABILITATION
Payer: OTHER GOVERNMENT

## 2022-08-08 VITALS
WEIGHT: 188 LBS | SYSTOLIC BLOOD PRESSURE: 118 MMHG | HEIGHT: 68 IN | BODY MASS INDEX: 28.49 KG/M2 | RESPIRATION RATE: 13 BRPM | HEART RATE: 88 BPM | DIASTOLIC BLOOD PRESSURE: 79 MMHG

## 2022-08-08 DIAGNOSIS — M51.36 DDD (DEGENERATIVE DISC DISEASE), LUMBAR: ICD-10-CM

## 2022-08-08 DIAGNOSIS — M53.3 SACROILIAC JOINT PAIN: Primary | ICD-10-CM

## 2022-08-08 DIAGNOSIS — M51.36 DDD (DEGENERATIVE DISC DISEASE), LUMBAR: Primary | ICD-10-CM

## 2022-08-08 PROCEDURE — 72114 X-RAY EXAM L-S SPINE BENDING: CPT | Mod: TC

## 2022-08-08 PROCEDURE — 99999 PR PBB SHADOW E&M-EST. PATIENT-LVL IV: CPT | Mod: PBBFAC,,, | Performed by: PHYSICAL MEDICINE & REHABILITATION

## 2022-08-08 PROCEDURE — 99203 OFFICE O/P NEW LOW 30 MIN: CPT | Mod: S$PBB,,, | Performed by: PHYSICAL MEDICINE & REHABILITATION

## 2022-08-08 PROCEDURE — 99203 PR OFFICE/OUTPT VISIT, NEW, LEVL III, 30-44 MIN: ICD-10-PCS | Mod: S$PBB,,, | Performed by: PHYSICAL MEDICINE & REHABILITATION

## 2022-08-08 PROCEDURE — 99999 PR PBB SHADOW E&M-EST. PATIENT-LVL IV: ICD-10-PCS | Mod: PBBFAC,,, | Performed by: PHYSICAL MEDICINE & REHABILITATION

## 2022-08-08 PROCEDURE — 72114 XR LUMBAR SPINE 5 VIEW WITH FLEX AND EXT: ICD-10-PCS | Mod: 26,,, | Performed by: RADIOLOGY

## 2022-08-08 PROCEDURE — 99214 OFFICE O/P EST MOD 30 MIN: CPT | Mod: PBBFAC | Performed by: PHYSICAL MEDICINE & REHABILITATION

## 2022-08-08 PROCEDURE — 72114 X-RAY EXAM L-S SPINE BENDING: CPT | Mod: 26,,, | Performed by: RADIOLOGY

## 2022-08-08 RX ORDER — KETOROLAC TROMETHAMINE 10 MG/1
10 TABLET, FILM COATED ORAL 2 TIMES DAILY
Qty: 10 TABLET | Refills: 0 | Status: SHIPPED | OUTPATIENT
Start: 2022-08-08 | End: 2022-08-13

## 2022-08-08 NOTE — PROGRESS NOTES
PM&[facility] NEW PATIENT HISTORY & PHYSICAL :    Referring Physician: Dr Thomas    Chief Complaint   Patient presents with    Back Pain       HPI: This is a 27 y.o.  male being seen in clinic today for evaluation of chronic low back achy pain and tightness.  With bending forward and overuse activity his symptoms can worsen.  He has recently participated in PT here which helped short term.  He responded short term to dry needling.     History obtained from patient    Functional History:  Walking: Not limited  Transfers: Independent  Assistive devices: No  Power mobility: No  Falls: None   Directional preference:ext  Employment status:    Needs help with:  Nothing - all ADLS normal    Past family, medical, social, and surgical history reviewed in chart    Review of Systems:     General- denies lethargy, weight change, fever, chills  Head/neck- denies swallowing difficulties  ENT- denies hearing changes  Cardiovascular-denies chest pain  Pulmonary- denies shortness of breath  GI- denies constipation or bowel incontinence  - denies bladder incontinence  Skin- denies wounds or rashes  Musculoskeletal- denies weakness, +pain  Neurologic- denies numbness and tingling  Psychiatric- denies depressive or psychotic features, denies anxiety  Lymphatic-denies swelling  Endocrine- denies hypoglycemic symptoms/DM history  All other pertinent systems negative     Physical Examination:  General: Well developed, well nourished male, NAD  HEENT:NCAT EOMI bilaterally   Pulmonary:Normal respirations    Spinal Examination: CERVICAL  Active ROM is within normal limits.  Inspection: No deformity of spinal alignment.    Spinal Examination: LUMBAR or THORACIC  Active ROM is within normal limits, painful at full forward flexion  Inspection: No deformity of spinal alignment.  No palpable olisthesis.  Palpation: No vertebral tenderness to percussion.  ttp at si joints, tight paraspinals  BOY: negative  Facet loading mild +on left  SLR Test  (seated and supine):negative to greater than 70 degrees bilaterally, tight hips  Able to stand on heels and toes    Bilateral Upper and Lower Extremities:  Pulses are 2+ at radial, bilaterally.  Shoulder/Elbow/Wrist/Hand ROM   Hip/Knee/Ankle ROM wnl  Bilateral Extremities show normal capillary refill.  No signs of cyanosis, rubor, edema, skin changes, or dysvascular changes of appendages.  Nails appear intact.    Neurological Exam:  Cranial Nerves:  II-XII grossly intact    Manual Muscle Testing: (Motor 5=normal)    RIGHT Lower extremity: Hip flexion 5/5, Hip Abduction 5/5, Hip Adduction 5/5, Knee extension 5/5, Knee flexion 5/5, Ankle dorsiflexion 5/5, Extensor hallucis longus 5/5, Ankle plantarflexion 5/5  LEFT Lower extremity:  Hip flexion 5/5, Hip Abduction 5/5,Hip Adduction 5/5, Knee extension 5/5, Knee flexion 5/5, Ankle dorsiflexion 5/5, Extensor hallucis longus 5/5, Ankle plantarflexion 5/5    No focal atrophy is noted of either upper or lower extremity.    Bilateral Reflexes:1+patellar  No clonus at knee or ankle.    Sensation: tested to light touch  - intact in legs  Gait: Narrow base and good arm swing.      IMPRESSION/PLAN: This is a 27 y.o.  male with sacroiliac pain, probable lumbar DDD/discogenic pain  Discussed in detail for 30 minutes about diagnosis and treatment plan    1. Xray lumbar spine  2. Cont HEP-cont focus on hip and core exercise  3. Ketorolac 10mg BID. Mag, potassium. Turmeric, cinnamon, garlic  4. Handouts on back care, stretch, etc provided  5. If not improving, will get MRI and refer to LINDA Bustillo M.D.  Physical Medicine and Rehab

## 2022-08-09 ENCOUNTER — CLINICAL SUPPORT (OUTPATIENT)
Dept: REHABILITATION | Facility: HOSPITAL | Age: 27
End: 2022-08-09
Payer: OTHER GOVERNMENT

## 2022-08-09 ENCOUNTER — PATIENT MESSAGE (OUTPATIENT)
Dept: UROLOGY | Facility: CLINIC | Age: 27
End: 2022-08-09
Payer: OTHER GOVERNMENT

## 2022-08-09 DIAGNOSIS — M62.89 MUSCLE TIGHTNESS: Primary | ICD-10-CM

## 2022-08-09 PROCEDURE — 97140 MANUAL THERAPY 1/> REGIONS: CPT

## 2022-08-09 PROCEDURE — 97110 THERAPEUTIC EXERCISES: CPT

## 2022-08-09 NOTE — PROGRESS NOTES
[facility]   Physical Therapy Treatment Note     Name: Cyril Carlton  Clinic Number: [id number]    Therapy Diagnosis:   Encounter Diagnosis   Name Primary?    Muscle tightness Yes     Physician: Lon Thomas MD  Visit Date: 8/9/2022  Physician Orders: PT Eval and Treat  Medical Diagnosis from Referral: Back Pain   Evaluation Date: 6/1/2022  Authorization Period Expiration: 9/19/2022  Plan of Care Expiration: 07/31/2022                          Progress Update: 06/30/2021                      FOTO: 1 / 3    Visit # / Visits authorized: 5/20 (+1 eval)     Time In: 0951  Time Out: 1035  Total Billable Time: 42 minutes (denotes billable time)    Precautions: Standard    SUBJECTIVE     Pt reports: that he got relief from Dry Needling after last session but only for a day. Patient reports that he did lower body lifting yesterday including front and back squat and did not have pain during but did have increased pain afterwards. Patient is requesting hip mobility exercises.     He was compliant with home exercise program.  Response to previous treatment: good  Functional change: none noted    Pain:  Current 3 /10, worst 7 /10, best 0 /10   Location: Low Back     OBJECTIVE     Objective Measures updated at progress report unless specified.     TREATMENT     Cyril received the treatments listed below:  All exercises performed bilaterally     Cyril received therapeutic exercises to develop strength, endurance, ROM, flexibility, posture and core stabilization for 27 minutes including:    Elliptical 5 minutes level 5   Seated Hip Rotation Alternating 5-10 second holds x10 B  Hip Flexor Stretch Half Kneeling Sustained with UE Flexion Arc x10 reps B  Piriformis Stretch Seated 5-10 second holds x5 reps B  Body Weight Squat assessment and modification to allow for better trunk and lower extremity alignment   Body Weight Squats with green theraband x20 reps   Hip Car demonstration    HELD  Patty Pose  and Half Kneeling Dorsifleion Stretch [id number]  Step Taps 6inch 3x10  Bridge on swiss ball 3x10  Side Plank with Abduction 3x5   Tempo squats 3x5  Bird/Dog 2x10  Trunk Rot Iso with Walkouts 3x5 30# each way   Single Leg MOBO with 5# KB 30x even and odd   Single Leg RDL to cone 2x10  Lumbar Flexion Stretch 3 way with swiss ball [id number] each way   Lateral Walks & Monster Walks green band 3x10    Cyril received the following manual therapy techniques: Myofacial release, Soft tissue Mobilization and Friction Massage were applied to the: lumbar and thoracic paraspinals and quadratus lumborum for 15 minutes    Patient Education and Home Exercises     Home Exercises and Patient Education Provided     Education/Self-Care provided: (included in treatment) minutes   · Patient educated on the impairments noted above and the effects of physical therapy intervention to improve overall condition and QOL.   · Patient was educated on all the above exercise prior/during/after for proper posture, positioning, and execution for safe performance with home exercise program.  · Exercise Prescription:   ? 6/1/2022: EVAL- Low      Written Home Exercises Provided: yes. Prefers: Electronic Copies  Exercises were reviewed and Cyril was able to demonstrate them prior to the end of the session.  Cyril demonstrated good understanding of the education provided.      See EMR under Patient Instructions for exercises provided during therapy sessions.    ASSESSMENT     Cyril Carlton tolerated treatment well today with relief of muscle tone following manual therapy interventions performed today. Provided patient with mobility program to assist in mobility deficits and warming up prior to lifting. Patient with compensatory strategies when squatting including external rotation of right out toeing with femoral internal rotation and lumbar hyperextension so discussed modifying squat form to allow for better symmetry and decrease strain on lumbar spine.  Incorporated theraband with body weight squatting with better foot position and abdominal engagement to improved gluteal activation and form noted following. Cyril demonstrated good understanding of new exercises and will continue to progress at home until next follow-up.       Cyril Is progressing well towards his goals.   Pt prognosis is Excellent.     Pt will continue to benefit from skilled outpatient physical therapy to address the deficits listed in the problem list box on initial evaluation, provide pt/family education and to maximize pt's level of independence in the home and community environment.     Pt's spiritual, cultural and educational needs considered and pt agreeable to plan of care and goals.     Anticipated barriers to physical therapy: occupation    GOALS:  SHORT TERM GOALS: 4 weeks, (06/30/22) 6/1/2022   1. Recent signs and systems trend is improving in order to progress towards LTG's.     2. Patient will be independent with HEP in order to further progress and return to maximal function.     3. Pain rating at Worst: 5/10 in order to progress towards increased independence with activity.     4. Patient will be able to correct postural deviations in sitting and standing, to decrease pain and promote postural awareness for injury prevention.         LONG TERM GOALS: 8 weeks, (07/30/22) 6/1/2022   1. Patient will return to normal ADL, recreational, and work related activities with less pain and limitation.      2. Patient will improve AROM to stated goals in order to return to maximal functional potential.      3. Patient will improve Strength to stated goals of appropriate musculature in order to improve functional independence.      4. Pain Rating at Best: 1/10 to improve Quality of Life.      5. Patient will meet predicted functional outcome (FOTO) score: 18% to increase self-worth & perceived functional ability.     6. Patient will have met/partially met personal goal of: being able to squat >  225lbs without back discomfort         PLAN     Continue with physical therapy as planned.     Plan of care Certification: 6/1/2022 to 07/30/2022    Rocio Calderon, PT, DPT

## 2022-08-11 NOTE — PROGRESS NOTES
[facility]   Physical Therapy Treatment Note + Updated Plan of Care     Name: Cyril Carlton  Clinic Number: [id number]    Therapy Diagnosis:   Encounter Diagnosis   Name Primary?    Muscle tightness Yes     Physician: Lon Thomas MD  Visit Date: 8/5/2022  Physician Orders: PT Eval and Treat  Medical Diagnosis from Referral: Back Pain   Evaluation Date: 6/1/2022  Authorization Period Expiration: 9/19/22  Plan of Care Expiration: 07/31/2022   (Extension 09/04/22)                      Progress Update: 06/30/2021  (Lasted performed 08/05/22)                    FOTO: 1 / 3    Visit # / Visits authorized: 4/20 (+1 eval)     Time In: 0830  Time Out: 0915  Total Billable Time: 45 minutes    Precautions: Standard    SUBJECTIVE     Pt reports:  Unable to workout due to traveling schedule. Has increase pain in low back region. Workout for the first time in one month on Monday prior to therapy session.   He was compliant with home exercise program.  Response to previous treatment: good  Functional change: none noted    Pain:  Current 8 /10, worst 7 /10, best 0 /10   Location: Low Back     OBJECTIVE     Objective Measures updated at progress report unless specified.      Lumbar AROM/PROM Spine  Right  6/1/2022 Left     6/1/2022 Pain/Dysfunction with Movement Goal   Lumbar Flexion (60) 50%   Pain with Movement   100%  Initial: 75%   Lumbar Extension (30) 50%   Pain with Movement    100%  Initial:    Lumbar Side Bending (25) 50% 50% Pain with Movement    100%  Initial:    Lumbar Rotation 100% 100%   Pain Free  Initial:       Hip AROM/PROM Right  6/1/2022 Left  6/1/2022 Pain/Dysfunction with Movement Goal   Hip IR (45) 5 5   40  Initial:    Hip ER (45) 30 30   40  Initial:        TREATMENT     Cyril received the treatments listed below:  All exercises performed bilaterally     Cyril received therapeutic exercises to develop strength, endurance, ROM, flexibility, posture and core stabilization for  15 minutes including:    Recumbent Bike  Glute Bridges 3x10  Bird/Dog 2x10  Pallof Press 2x15 green band     HELD  Elliptical 5 minutes level 5   Patty Pose and Half Kneeling Dorsifleion Stretch [id number]  Step Taps 6inch 3x10  Side Plank with Abduction 3x5   Tempo squats 3x5  Trunk Rot Iso with Walkouts 3x5 30# each way   Single Leg MOBO with 5# KB 30x even and odd   Single Leg RDL to cone 2x10  Lumbar Flexion Stretch 3 way with swiss ball [id number] each way   Lateral Walks & Monster Walks green band 3x10      Cyril received the following manual therapy techniques: Joint mobilizations, Manual traction, Myofacial release, Soft tissue Mobilization and Friction Massage were applied to the: hips and QL for 25 minutes, including:  Soft tissue and joint mobs  FDN- QL, Multifidus, glute meds     Patient Education and Home Exercises     Home Exercises and Patient Education Provided     Education/Self-Care provided: (included in treatment) minutes   · Patient educated on the impairments noted above and the effects of physical therapy intervention to improve overall condition and QOL.   · Patient was educated on all the above exercise prior/during/after for proper posture, positioning, and execution for safe performance with home exercise program.  · Exercise Prescription:   ? 6/1/2022: EVAL- Low      Written Home Exercises Provided: yes. Prefers: Electronic Copies  Exercises were reviewed and Cyril was able to demonstrate them prior to the end of the session.  Cyril demonstrated good understanding of the education provided.      See EMR under Patient Instructions for exercises provided during therapy sessions.    ASSESSMENT     Cyril Carlton tolerated PT session well with minimal  complaints of pain or discomfort, secondary to poor motor coordination. Objective findings are improving with measurements of ROM and functional mobility.  Therapy exercises were reviewed by revisiting exercises given from previous home exercise  program while adding tempo squats.  Handouts were not issued during today's visit. Cyril demonstrated good understanding of new exercises and will continue to progress at home until next follow-up.         Cyril Is progressing well towards his goals.   Pt prognosis is Excellent.     Pt will continue to benefit from skilled outpatient physical therapy to address the deficits listed in the problem list box on initial evaluation, provide pt/family education and to maximize pt's level of independence in the home and community environment.     Pt's spiritual, cultural and educational needs considered and pt agreeable to plan of care and goals.     Anticipated barriers to physical therapy: occupation    GOALS:  SHORT TERM GOALS: 4 weeks, (06/30/22) 6/1/2022   1. Recent signs and systems trend is improving in order to progress towards LTG's. PC    2. Patient will be independent with HEP in order to further progress and return to maximal function. PC     3. Pain rating at Worst: 5/10 in order to progress towards increased independence with activity. PC     4. Patient will be able to correct postural deviations in sitting and standing, to decrease pain and promote postural awareness for injury prevention.  PC        LONG TERM GOALS: 8 weeks, (07/30/22) 6/1/2022   1. Patient will return to normal ADL, recreational, and work related activities with less pain and limitation.  PC     2. Patient will improve AROM to stated goals in order to return to maximal functional potential.  PC     3. Patient will improve Strength to stated goals of appropriate musculature in order to improve functional independence.  PC     4. Pain Rating at Best: 1/10 to improve Quality of Life.      5. Patient will meet predicted functional outcome (FOTO) score: 18% to increase self-worth & perceived functional ability. PC     6. Patient will have met/partially met personal goal of: being able to squat > 225lbs without back discomfort  PC        PLAN      Continue with physical therapy as planned.   2x weekly for 4 weeks.     Plan of care Certification: 6/1/2022 to 07/30/2022 Extension of Plan of Care thru 09/05/22    Shayne Brody, PT, DPT

## 2022-08-15 ENCOUNTER — TELEPHONE (OUTPATIENT)
Dept: UROLOGY | Facility: CLINIC | Age: 27
End: 2022-08-15
Payer: OTHER GOVERNMENT

## 2022-08-15 NOTE — TELEPHONE ENCOUNTER
Reached out to pt in regards to wanting an earlier appt date. Informed the pt of all the available dates and he decided to be scheduled for Friday the 19th to see Dr. Freeman.

## 2022-08-16 ENCOUNTER — CLINICAL SUPPORT (OUTPATIENT)
Dept: REHABILITATION | Facility: HOSPITAL | Age: 27
End: 2022-08-16
Payer: OTHER GOVERNMENT

## 2022-08-16 DIAGNOSIS — M62.89 MUSCLE TIGHTNESS: Primary | ICD-10-CM

## 2022-08-16 PROCEDURE — 97110 THERAPEUTIC EXERCISES: CPT

## 2022-08-16 PROCEDURE — 97140 MANUAL THERAPY 1/> REGIONS: CPT

## 2022-08-16 NOTE — PROGRESS NOTES
[facility]   Physical Therapy Treatment Note      Name: Cyril Carlton  Clinic Number: [id number]    Therapy Diagnosis:   Encounter Diagnosis   Name Primary?    Muscle tightness Yes     Physician: Lon Thomas MD  Visit Date: 8/16/2022  Physician Orders: PT Eval and Treat  Medical Diagnosis from Referral: Back Pain   Evaluation Date: 6/1/2022  Authorization Period Expiration: 9/19/22  Plan of Care Expiration: 07/31/2022   (Extension 09/04/22)                      Progress Update: 06/30/2021  (Lasted performed 08/05/22)                    FOTO: 1 / 3    Visit # / Visits authorized: 6/20 (+1 eval)     Time In: 0830  Time Out: 0915  Total Billable Time: 45 minutes    Precautions: Standard    SUBJECTIVE     Pt reports: improvement in low back pain from prior session. Still having some discomfort with certain ADLs.    He was compliant with home exercise program.  Response to previous treatment: good  Functional change: none noted    Pain:  Current  5/10, worst 7 /10, best 2 /10   Location: Low Back     OBJECTIVE     Objective Measures updated at progress report unless specified.       TREATMENT     Cyril received the treatments listed below:  All exercises performed bilaterally     Cyril received therapeutic exercises to develop strength, endurance, ROM, flexibility, posture and core stabilization for 25 minutes including:    Recumbent Bike 5 mins   Glute Bridges 3x10  Bird/Dog 2x10  Trunk Rot Iso with Walkouts 3x5 50# each way   Prone Is and Ts 3x8  Wall [place] 2x15    HELD  Elliptical 5 minutes level 5   Patty Pose and Half Kneeling Dorsifleion Stretch [id number]  Step Taps 6inch 3x10  Side Plank with Abduction 3x5   Tempo squats 3x5  Single Leg MOBO with 5# KB 30x even and odd   Single Leg RDL to cone 2x10  Lumbar Flexion Stretch 3 way with swiss ball [id number] each way   Lateral Walks & Monster Walks green band 3x10      Cyril received the following manual therapy techniques: Joint  mobilizations, Manual traction, Myofacial release, Soft tissue Mobilization and Friction Massage were applied to the: hips and QL for 15 minutes, including:    Soft tissue and joint mobs using cupping   FDN- QL, Multifidus, glute meds     Patient Education and Home Exercises     Home Exercises and Patient Education Provided     Education/Self-Care provided: (included in treatment) minutes   · Patient educated on the impairments noted above and the effects of physical therapy intervention to improve overall condition and QOL.   · Patient was educated on all the above exercise prior/during/after for proper posture, positioning, and execution for safe performance with home exercise program.  · Exercise Prescription:   ? 6/1/2022: EVAL- Low      Written Home Exercises Provided: yes. Prefers: Electronic Copies  Exercises were reviewed and Cyril was able to demonstrate them prior to the end of the session.  Cyril demonstrated good understanding of the education provided.      See EMR under Patient Instructions for exercises provided during therapy sessions.    ASSESSMENT     Cyril Carlton tolerated PT session well with minimal  complaints of pain or discomfort, secondary to poor motor coordination. Objective findings are improving with measurements of ROM and functional mobility.  Therapy exercises were reviewed by revisiting exercises given from previous home exercise program while adding tempo squats.  Handouts were not issued during today's visit. Cyril demonstrated good understanding of new exercises and will continue to progress at home until next follow-up.         Cyril Is progressing well towards his goals.   Pt prognosis is Excellent.     Pt will continue to benefit from skilled outpatient physical therapy to address the deficits listed in the problem list box on initial evaluation, provide pt/family education and to maximize pt's level of independence in the home and community environment.     Pt's spiritual,  cultural and educational needs considered and pt agreeable to plan of care and goals.     Anticipated barriers to physical therapy: occupation    GOALS:  SHORT TERM GOALS: 4 weeks, (06/30/22) 6/1/2022   1. Recent signs and systems trend is improving in order to progress towards LTG's. PC    2. Patient will be independent with HEP in order to further progress and return to maximal function. PC     3. Pain rating at Worst: 5/10 in order to progress towards increased independence with activity. PC     4. Patient will be able to correct postural deviations in sitting and standing, to decrease pain and promote postural awareness for injury prevention.  PC        LONG TERM GOALS: 8 weeks, (07/30/22) 6/1/2022   1. Patient will return to normal ADL, recreational, and work related activities with less pain and limitation.  PC     2. Patient will improve AROM to stated goals in order to return to maximal functional potential.  PC     3. Patient will improve Strength to stated goals of appropriate musculature in order to improve functional independence.  PC     4. Pain Rating at Best: 1/10 to improve Quality of Life.      5. Patient will meet predicted functional outcome (FOTO) score: 18% to increase self-worth & perceived functional ability. PC     6. Patient will have met/partially met personal goal of: being able to squat > 225lbs without back discomfort  PC        PLAN     Continue with physical therapy as planned.   2x weekly for 4 weeks.     Plan of care Certification: 6/1/2022 to 07/30/2022   Extension of Plan of Care thru 09/05/22    Shayne Brody, PT, DPT

## 2022-08-19 ENCOUNTER — OFFICE VISIT (OUTPATIENT)
Dept: UROLOGY | Facility: CLINIC | Age: 27
End: 2022-08-19
Payer: OTHER GOVERNMENT

## 2022-08-19 VITALS
RESPIRATION RATE: 18 BRPM | BODY MASS INDEX: 28.13 KG/M2 | WEIGHT: 185.63 LBS | DIASTOLIC BLOOD PRESSURE: 78 MMHG | HEART RATE: 85 BPM | SYSTOLIC BLOOD PRESSURE: 116 MMHG | HEIGHT: 68 IN

## 2022-08-19 DIAGNOSIS — N45.1 EPIDIDYMITIS: Primary | ICD-10-CM

## 2022-08-19 PROCEDURE — 99214 OFFICE O/P EST MOD 30 MIN: CPT | Mod: S$PBB,,, | Performed by: UROLOGY

## 2022-08-19 PROCEDURE — 99213 OFFICE O/P EST LOW 20 MIN: CPT | Mod: PBBFAC | Performed by: UROLOGY

## 2022-08-19 PROCEDURE — 99214 PR OFFICE/OUTPT VISIT, EST, LEVL IV, 30-39 MIN: ICD-10-PCS | Mod: S$PBB,,, | Performed by: UROLOGY

## 2022-08-19 PROCEDURE — 99999 PR PBB SHADOW E&M-EST. PATIENT-LVL III: ICD-10-PCS | Mod: PBBFAC,,, | Performed by: UROLOGY

## 2022-08-19 PROCEDURE — 99999 PR PBB SHADOW E&M-EST. PATIENT-LVL III: CPT | Mod: PBBFAC,,, | Performed by: UROLOGY

## 2022-08-19 RX ORDER — SULFAMETHOXAZOLE AND TRIMETHOPRIM 800; 160 MG/1; MG/1
1 TABLET ORAL 2 TIMES DAILY
Qty: 14 TABLET | Refills: 0 | Status: SHIPPED | OUTPATIENT
Start: 2022-08-19 | End: 2022-08-26

## 2022-08-20 ENCOUNTER — PATIENT MESSAGE (OUTPATIENT)
Dept: UROLOGY | Facility: CLINIC | Age: 27
End: 2022-08-20
Payer: OTHER GOVERNMENT

## 2022-08-21 NOTE — PROGRESS NOTES
Chief Complaint:  Right testicular pain    HPI:   08/19/2022 - returns for follow-up, notes that his discomfort resolved after he saw me but then became worse, particularly from August 12th through the 13th, he states that it was sewed uncomfortable that he was considering going to the ER for evaluation, he denies any trauma, notes that the pain was localized to the right posterior aspect of his testicle, denies any voiding issues or dysuria, denies gross hematuria    06/21/2022 - 28 yo male that presents for right testicular pain.  Patient notes a testicular pain which initially occurred about 1.5yrs ago.  States that he woke up with severe pain in his right testicle and noted that the testicle was a flipped vertically.  States that he manually detorsed.  This improved his pain.  He notes intermittent discomfort now, related to working out.  At worst 2-3/10.  Denies any voiding issues or gross hematuria.  Denies ED.  Denies family history of  cancers.  No recent imaging.  Currently pain-free.      PMH:  Past Medical History:   Diagnosis Date    Patient denies medical problems        PSH:  Past Surgical History:   Procedure Laterality Date    NO PAST SURGERIES         Family History:  Family History   Problem Relation Age of Onset    No Known Problems Mother     No Known Problems Father     No Known Problems Sister     Diabetes Maternal Grandmother     Diabetes Paternal Grandmother     Diabetes Paternal Grandfather        Social History:  Social History     Tobacco Use    Smoking status: Never Smoker    Smokeless tobacco: Never Used   Substance Use Topics    Alcohol use: Yes     Alcohol/week: 2.0 standard drinks     Types: 2 Cans of beer per week    Drug use: Never        Review of Systems:  General: No fever, chills  Skin: No rashes  Chest:  Denies cough and sputum production  Heart: Denies chest pain  Resp: Denies dyspnea  Abdomen: Denies diarrhea, abdominal pain, hematemesis, or blood in  stool.  Musculoskeletal: No joint stiffness or swelling. Denies back pain.  : see HPI  Neuro: no dizziness or weakness    Allergies:  Patient has no known allergies.    Medications:    Current Outpatient Medications:     sulfamethoxazole-trimethoprim 800-160mg (BACTRIM DS) 800-160 mg Tab, Take 1 tablet by mouth 2 (two) times daily. for 7 days, Disp: 14 tablet, Rfl: 0    Physical Exam:  Vitals:    08/19/22 1024   BP: 116/78   Pulse: 85   Resp: 18     Body mass index is 28.22 kg/m².  General: awake, alert, cooperative  Head: NC/AT  Ears: external ears normal  Eyes: sclera normal  Lungs: normal inspiration, NAD  Heart: well-perfused  Abdomen: Soft, NT, ND  : Normal circ'd phallus, meatus normal in size and position, BL testicles palpable, no masses, nontender, no abnormalities of epididymi but notes that his right epididymis is uncomfortable  Lymphatic: groin nodes negative  Skin: The skin is warm and dry  Ext: No c/c/e.  Neuro: grossly intact, AOx3    RADIOLOGY:  US SCROTUM AND TESTICLES 06/24/2022     CLINICAL HISTORY:  Right testicular pain     COMPARISON:  None     FINDINGS:  The right testicle is 2.3 x 2.9 x 4.3cm.  Right epididymal head is normal.     The left testicle is 1.5 x 2.4 x 4.5cm.  Left epididymal head is normal.     Normal intratesticular blood flow is seen.     Impression:     No evidence of testicular torsion. Normal testicular ultrasound.    LABS:  I personally reviewed the following lab values:  Lab Results   Component Value Date    WBC 6.34 08/02/2022    HGB 15.9 08/02/2022    HCT 47.9 08/02/2022     08/02/2022     08/02/2022    K 4.5 08/02/2022     08/02/2022    CREATININE 1.3 08/02/2022    BUN 12 08/02/2022    CO2 27 08/02/2022    TSH 0.801 08/02/2022    HGBA1C 4.9 08/02/2022    CHOL 187 08/02/2022    TRIG 78 08/02/2022    HDL 81 (H) 08/02/2022    ALT 34 08/02/2022    AST 35 08/02/2022       Assessment/Plan:   Mr. Cyril Carlton is a 27 y.o. male with episodes of acute  right-sided testicular pain possibly epididymitis, will try a course of Bactrim to see if this improves his discomfort, otherwise continue scrotal support and p.r.n. NSAIDs    Thank you for allowing me the opportunity to participate in this patient's care.     Rupert Freeman MD  Urology

## 2022-08-26 ENCOUNTER — CLINICAL SUPPORT (OUTPATIENT)
Dept: REHABILITATION | Facility: HOSPITAL | Age: 27
End: 2022-08-26
Payer: OTHER GOVERNMENT

## 2022-08-26 DIAGNOSIS — M62.89 MUSCLE TIGHTNESS: Primary | ICD-10-CM

## 2022-08-26 PROCEDURE — 97140 MANUAL THERAPY 1/> REGIONS: CPT

## 2022-08-26 PROCEDURE — 97110 THERAPEUTIC EXERCISES: CPT

## 2022-08-26 NOTE — PROGRESS NOTES
[facility]   Physical Therapy Treatment Note      Name: Cyril Carlton  Clinic Number: [id number]    Therapy Diagnosis:   Encounter Diagnosis   Name Primary?    Muscle tightness Yes     Physician: Lon Thomas MD  Visit Date: 8/26/2022  Physician Orders: PT Eval and Treat  Medical Diagnosis from Referral: Back Pain   Evaluation Date: 6/1/2022  Authorization Period Expiration: 9/19/22  Plan of Care Expiration: 07/31/2022   (Extension 09/04/22)                      Progress Update:  Lasted performed 08/05/22                   FOTO: 1 / 3    Visit # / Visits authorized: 7/20 (+1 eval)     Time In: 1242  Time Out: 1331  Total Billable Time: 45 minutes (denotes billable time)    Precautions: Standard    SUBJECTIVE     Pt reports: that he is feeling pretty good today, no complaints of pain. Has been working on his hip mobility more.     He was compliant with home exercise program.  Response to previous treatment: good  Functional change: none noted    Pain:  Current  0/10, worst 2-3 /10, best 0 /10   Location: Low Back     OBJECTIVE     Objective Measures updated at progress report unless specified.     TREATMENT     Cyril received the treatments listed below:  All exercises performed bilaterally     Cyril received therapeutic exercises to develop strength, endurance, ROM, flexibility, posture and core stabilization for 33 minutes including:    Upright Bike 5 mins level 8  Prone Is and Ts modified range 3x10 each direction B  Assessed chest press with dumbbells and bench press with barbell with modifications and cueing provided     HELD  Wall [place] 2x15  Glute Bridges 3x10  Bird/Dog 2x10  Trunk Rot Iso with Walkouts 3x5 50# each way   Elliptical 5 minutes level 5   Patty Pose and Half Kneeling Dorsifleion Stretch [id number]  Step Taps 6inch 3x10  Side Plank with Abduction 3x5   Tempo squats 3x5  Single Leg MOBO with 5# KB 30x even and odd   Single Leg RDL to cone 2x10  Lumbar Flexion  Stretch 3 way with swiss ball [id number] each way   Lateral Walks & Monster Walks green band 3x10      Cyril received the following manual therapy techniques: Joint mobilizations, Manual traction, Myofacial release, Soft tissue Mobilization and Friction Massage were applied to the: right upper trapezius for 12 minutes    Patient Education and Home Exercises     Home Exercises and Patient Education Provided     Education/Self-Care provided: (included in treatment) minutes   · Patient educated on the impairments noted above and the effects of physical therapy intervention to improve overall condition and QOL.   · Patient was educated on all the above exercise prior/during/after for proper posture, positioning, and execution for safe performance with home exercise program.  · Exercise Prescription:   ? 6/1/2022: FELICITAS- Low      Written Home Exercises Provided: yes. Prefers: Electronic Copies  Exercises were reviewed and Cyril was able to demonstrate them prior to the end of the session.  Cyril demonstrated good understanding of the education provided.      See EMR under Patient Instructions for exercises provided during therapy sessions.    ASSESSMENT     Cyril Carlton tolerated PT session well today. Patient requested addressing upper trapezius pain when lifting. Patient noted with compensations of less depth throughout chest press range with right upper extremity in more narrow position with right hand angled 45 degrees outward and with barbell his right hand is 2 inches+ wider than left. Educated patient extensively on the importance of a more symmetrical lifting pattern to ensure less compensatory muscle pain and activation. Provided patient with modifications for lifting form although patient was hesitant to try modifications provided. Patient noted with considerable upper trapezius activation with prone Ts and Is on right with proper muscle activation noted on left side. Educated patient on the importance of calming  down upper trapezius activation in future sessions while continuing to focus on scapular strengthening to allow for long term resolution of pain. Will discuss with next treating therapist.       Cyril Is progressing well towards his goals.   Pt prognosis is Excellent.     Pt will continue to benefit from skilled outpatient physical therapy to address the deficits listed in the problem list box on initial evaluation, provide pt/family education and to maximize pt's level of independence in the home and community environment.     Pt's spiritual, cultural and educational needs considered and pt agreeable to plan of care and goals.     Anticipated barriers to physical therapy: occupation    GOALS:  SHORT TERM GOALS: 4 weeks, (06/30/22) 6/1/2022   1. Recent signs and systems trend is improving in order to progress towards LTG's. PC    2. Patient will be independent with HEP in order to further progress and return to maximal function. PC     3. Pain rating at Worst: 5/10 in order to progress towards increased independence with activity. PC     4. Patient will be able to correct postural deviations in sitting and standing, to decrease pain and promote postural awareness for injury prevention.  PC        LONG TERM GOALS: 8 weeks, (07/30/22) 6/1/2022   1. Patient will return to normal ADL, recreational, and work related activities with less pain and limitation.  PC     2. Patient will improve AROM to stated goals in order to return to maximal functional potential.  PC     3. Patient will improve Strength to stated goals of appropriate musculature in order to improve functional independence.  PC     4. Pain Rating at Best: 1/10 to improve Quality of Life.      5. Patient will meet predicted functional outcome (FOTO) score: 18% to increase self-worth & perceived functional ability. PC     6. Patient will have met/partially met personal goal of: being able to squat > 225lbs without back discomfort  PC        PLAN     Continue  with physical therapy as planned.   2x weekly for 4 weeks.     Plan of care Certification: 6/1/2022 to 07/30/2022   Extension of Plan of Care thru 09/05/22    Rocio Calderon, PT, DPT

## 2022-09-01 ENCOUNTER — CLINICAL SUPPORT (OUTPATIENT)
Dept: REHABILITATION | Facility: HOSPITAL | Age: 27
End: 2022-09-01
Payer: OTHER GOVERNMENT

## 2022-09-01 DIAGNOSIS — M62.89 MUSCLE TIGHTNESS: Primary | ICD-10-CM

## 2022-09-01 PROCEDURE — 97110 THERAPEUTIC EXERCISES: CPT

## 2022-09-01 NOTE — PROGRESS NOTES
[facility]   Physical Therapy Treatment Note      Name: Cyril Carlton  Clinic Number: [id number]    Therapy Diagnosis:   Encounter Diagnosis   Name Primary?    Muscle tightness Yes     Physician: Lon Thomas MD  Visit Date: 9/1/2022  Physician Orders: PT Eval and Treat  Medical Diagnosis from Referral: Back Pain   Evaluation Date: 6/1/2022  Authorization Period Expiration: 9/19/22  Plan of Care Expiration: 07/31/2022   (Extension 09/04/22)                      Progress Update:  Lasted performed 08/05/22                   FOTO: 1 / 3    Visit # / Visits authorized: 8/20 (+1 eval)     Time In: 2:25pm  Time Out: 3:25pm  Total Billable Time: 55 minutes (denotes billable time)    Precautions: Standard    SUBJECTIVE     Pt reports: He feels good he just feels asystematic with lower and upper body movements from compensating for so long.    He was compliant with home exercise program.  Response to previous treatment: good  Functional change: none noted    Pain:  Current  0/10, worst 2-3 /10, best 0 /10   Location: Low Back     OBJECTIVE     Objective Measures updated at progress report unless specified.     TREATMENT     Cyril received the treatments listed below:  All exercises performed bilaterally     Cyril received therapeutic exercises to develop strength, endurance, ROM, flexibility, posture and core stabilization for 55 minutes including:    Upright Bike 5 mins level 8  Squat assessment  Education on lifting technique and single arm/leg movements   Step Taps 6inch 3x10  Single Leg RDL 3x10  Single Leg Bridge on swiss ball 3x10  Lat Pulldown with emphasis on scapular depression 15x  D1 Extension with emphasis on scapular depression 15x    HELD  Wall [place] 2x15  Glute Bridges 3x10  Bird/Dog 2x10  Trunk Rot Iso with Walkouts 3x5 50# each way   Elliptical 5 minutes level 5   Patty Pose and Half Kneeling Dorsifleion Stretch [id number]  Step Taps 6inch 3x10  Side Plank with Abduction 3x5    Tempo squats 3x5  Single Leg MOBO with 5# KB 30x even and odd   Lumbar Flexion Stretch 3 way with swiss ball [id number] each way   Lateral Walks & Monster Walks green band 3x10    Cyril received the following manual therapy techniques: Joint mobilizations, Manual traction, Myofacial release, Soft tissue Mobilization and Friction Massage were applied to the: right upper trapezius for 0 minutes    Patient Education and Home Exercises     Home Exercises and Patient Education Provided     Education/Self-Care provided: (included in treatment) minutes   Patient educated on the impairments noted above and the effects of physical therapy intervention to improve overall condition and QOL.   Patient was educated on all the above exercise prior/during/after for proper posture, positioning, and execution for safe performance with home exercise program.  Exercise Prescription:   6/1/2022: EVAL- Low      Written Home Exercises Provided: yes. Prefers: Electronic Copies  Exercises were reviewed and Cyril was able to demonstrate them prior to the end of the session.  Cyril demonstrated good understanding of the education provided.      See EMR under Patient Instructions for exercises provided during therapy sessions.    ASSESSMENT     Most of the session today was on education and demonstration with lifting form and techniques. Patient was educated most on performing single arm stuff focusing on scapular mobility to minimize upper traps activation. Lower body education was on maintaining symmetry and level hips with equal external rotation of each limb. Upper body was emphasis on education for scapular depression. He tolerated today's session well.     Cyril Is progressing well towards his goals.   Pt prognosis is Excellent.     Pt will continue to benefit from skilled outpatient physical therapy to address the deficits listed in the problem list box on initial evaluation, provide pt/family education and to maximize pt's level of  independence in the home and community environment.     Pt's spiritual, cultural and educational needs considered and pt agreeable to plan of care and goals.     Anticipated barriers to physical therapy: occupation    GOALS:  SHORT TERM GOALS: 4 weeks, (06/30/22) 6/1/2022   Recent signs and systems trend is improving in order to progress towards LTG's. PC    Patient will be independent with HEP in order to further progress and return to maximal function. PC     Pain rating at Worst: 5/10 in order to progress towards increased independence with activity. PC     Patient will be able to correct postural deviations in sitting and standing, to decrease pain and promote postural awareness for injury prevention.  PC        LONG TERM GOALS: 8 weeks, (07/30/22) 6/1/2022   Patient will return to normal ADL, recreational, and work related activities with less pain and limitation.  PC     Patient will improve AROM to stated goals in order to return to maximal functional potential.  PC     Patient will improve Strength to stated goals of appropriate musculature in order to improve functional independence.  PC     Pain Rating at Best: 1/10 to improve Quality of Life.      Patient will meet predicted functional outcome (FOTO) score: 18% to increase self-worth & perceived functional ability. PC     Patient will have met/partially met personal goal of: being able to squat > 225lbs without back discomfort  PC        PLAN     Continue with physical therapy as planned.   2x weekly for 4 weeks.     Plan of care Certification: 6/1/2022 to 07/30/2022   Extension of Plan of Care thru 09/05/22    Lon Vizcarra, PT, DPT

## 2022-09-02 ENCOUNTER — OFFICE VISIT (OUTPATIENT)
Dept: UROLOGY | Facility: CLINIC | Age: 27
End: 2022-09-02
Payer: OTHER GOVERNMENT

## 2022-09-02 VITALS
DIASTOLIC BLOOD PRESSURE: 66 MMHG | BODY MASS INDEX: 28.04 KG/M2 | HEART RATE: 73 BPM | HEIGHT: 68 IN | SYSTOLIC BLOOD PRESSURE: 102 MMHG | WEIGHT: 185 LBS | TEMPERATURE: 98 F

## 2022-09-02 DIAGNOSIS — N50.811 PAIN IN RIGHT TESTICLE: Primary | ICD-10-CM

## 2022-09-02 PROCEDURE — 99999 PR PBB SHADOW E&M-EST. PATIENT-LVL III: ICD-10-PCS | Mod: PBBFAC,,, | Performed by: UROLOGY

## 2022-09-02 PROCEDURE — 99999 PR PBB SHADOW E&M-EST. PATIENT-LVL III: CPT | Mod: PBBFAC,,, | Performed by: UROLOGY

## 2022-09-02 PROCEDURE — 99213 OFFICE O/P EST LOW 20 MIN: CPT | Mod: PBBFAC | Performed by: UROLOGY

## 2022-09-02 PROCEDURE — 99213 OFFICE O/P EST LOW 20 MIN: CPT | Mod: S$PBB,,, | Performed by: UROLOGY

## 2022-09-02 PROCEDURE — 99213 PR OFFICE/OUTPT VISIT, EST, LEVL III, 20-29 MIN: ICD-10-PCS | Mod: S$PBB,,, | Performed by: UROLOGY

## 2022-09-02 NOTE — PROGRESS NOTES
Chief Complaint:  Right testicular pain    HPI:   09/02/2022 - returns today for follow-up, notes that a day or two after he started the antibiotics his pain increased substantially, however since that time his pain has decreased in has since resolved, no voiding issues no side effects from the antibiotics    08/19/2022 - returns for follow-up, notes that his discomfort resolved after he saw me but then became worse, particularly from August 12th through the 13th, he states that it was sewed uncomfortable that he was considering going to the ER for evaluation, he denies any trauma, notes that the pain was localized to the right posterior aspect of his testicle, denies any voiding issues or dysuria, denies gross hematuria    06/21/2022 - 28 yo male that presents for right testicular pain.  Patient notes a testicular pain which initially occurred about 1.5yrs ago.  States that he woke up with severe pain in his right testicle and noted that the testicle was a flipped vertically.  States that he manually detorsed.  This improved his pain.  He notes intermittent discomfort now, related to working out.  At worst 2-3/10.  Denies any voiding issues or gross hematuria.  Denies ED.  Denies family history of  cancers.  No recent imaging.  Currently pain-free.      PMH:  Past Medical History:   Diagnosis Date    Patient denies medical problems        PSH:  Past Surgical History:   Procedure Laterality Date    NO PAST SURGERIES         Family History:  Family History   Problem Relation Age of Onset    No Known Problems Mother     No Known Problems Father     No Known Problems Sister     Diabetes Maternal Grandmother     Diabetes Paternal Grandmother     Diabetes Paternal Grandfather        Social History:  Social History     Tobacco Use    Smoking status: Never    Smokeless tobacco: Never   Substance Use Topics    Alcohol use: Yes     Alcohol/week: 2.0 standard drinks     Types: 2 Cans of beer per week    Drug use: Never         Review of Systems:  General: No fever, chills  Skin: No rashes  Chest:  Denies cough and sputum production  Heart: Denies chest pain  Resp: Denies dyspnea  Abdomen: Denies diarrhea, abdominal pain, hematemesis, or blood in stool.  Musculoskeletal: No joint stiffness or swelling. Denies back pain.  : see HPI  Neuro: no dizziness or weakness    Allergies:  Patient has no known allergies.    Medications:  No current outpatient medications on file.    Physical Exam:  Vitals:    09/02/22 0950   BP: 102/66   Pulse: 73   Temp: 97.8 °F (36.6 °C)     Body mass index is 28.13 kg/m².  General: awake, alert, cooperative  Head: NC/AT  Ears: external ears normal  Eyes: sclera normal  Lungs: normal inspiration, NAD  Heart: well-perfused  Abdomen: Soft, NT, ND   8/22: Normal circ'd phallus, meatus normal in size and position, BL testicles palpable, no masses, nontender, no abnormalities of epididymi but notes that his right epididymis is uncomfortable  Lymphatic: groin nodes negative  Skin: The skin is warm and dry  Ext: No c/c/e.  Neuro: grossly intact, AOx3    RADIOLOGY:  US SCROTUM AND TESTICLES 06/24/2022     CLINICAL HISTORY:  Right testicular pain     COMPARISON:  None     FINDINGS:  The right testicle is 2.3 x 2.9 x 4.3cm.  Right epididymal head is normal.     The left testicle is 1.5 x 2.4 x 4.5cm.  Left epididymal head is normal.     Normal intratesticular blood flow is seen.     Impression:     No evidence of testicular torsion. Normal testicular ultrasound.    LABS:  I personally reviewed the following lab values:  Lab Results   Component Value Date    WBC 6.34 08/02/2022    HGB 15.9 08/02/2022    HCT 47.9 08/02/2022     08/02/2022     08/02/2022    K 4.5 08/02/2022     08/02/2022    CREATININE 1.3 08/02/2022    BUN 12 08/02/2022    CO2 27 08/02/2022    TSH 0.801 08/02/2022    HGBA1C 4.9 08/02/2022    CHOL 187 08/02/2022    TRIG 78 08/02/2022    HDL 81 (H) 08/02/2022    ALT 34 08/02/2022    AST  35 08/02/2022       Assessment/Plan:   Mr. Cyril Carlton is a 27 y.o. male with episodes of acute right-sided testicular pain possibly epididymitis, Bactrim seems to have resolved his discomfort. Continue PRN NSAIDs and scrotal support, f/u 4 weeks    Thank you for allowing me the opportunity to participate in this patient's care.     Rupert Freeman MD  Urology

## 2022-09-07 ENCOUNTER — CLINICAL SUPPORT (OUTPATIENT)
Dept: REHABILITATION | Facility: HOSPITAL | Age: 27
End: 2022-09-07
Payer: OTHER GOVERNMENT

## 2022-09-07 DIAGNOSIS — M62.89 MUSCLE TIGHTNESS: Primary | ICD-10-CM

## 2022-09-07 PROCEDURE — 97110 THERAPEUTIC EXERCISES: CPT

## 2022-09-13 NOTE — PROGRESS NOTES
[facility]   Physical Therapy Treatment Note  + Updated Plan of Care     Name: Cyril Carlton  Clinic Number: [id number]    Therapy Diagnosis:   Encounter Diagnosis   Name Primary?    Muscle tightness Yes     Physician: Lon Thomas MD  Visit Date: 9/7/2022  Physician Orders: PT Eval and Treat  Medical Diagnosis from Referral: Back Pain   Evaluation Date: 6/1/2022  Authorization Period Expiration: 9/19/22  Plan of Care Expiration: 07/31/2022   (Extension 10/04/22)                      Progress Update:  Lasted performed 09/07/22                   FOTO: 1 / 3    Visit # / Visits authorized: 9/20 (+1 eval)     Time In: 1415  Time Out: 1500  Total Billable Time: 45 minutes (denotes billable time)    Precautions: Standard    SUBJECTIVE     Pt reports: improvement in low back pain     He was compliant with home exercise program.  Response to previous treatment: good  Functional change: none noted    Pain:  Current  0/10, worst 2-3 /10, best 0 /10   Location: Low Back     OBJECTIVE     Objective Measures updated at progress report unless specified.   Updated 09/07/22  Lumbar AROM/PROM Spine  Right    Left       Pain/Dysfunction with Movement Goal   Lumbar Flexion (60) 25%   Pain with Movement   100%  Initial: 75%   Lumbar Extension (30) 50%   Pain with Movement    100%  Initial:    Lumbar Side Bending (25) 50% 50% Pain with Movement    100%  Initial:   50% (B)   Lumbar Rotation 100% 100%   Pain Free  Initial:       Hip AROM/PROM Right   Left   Pain/Dysfunction with Movement Goal   Hip IR (45) 5 5 Discomfort   40  Initial:    Hip ER (45) 30 30   40  Initial:      TREATMENT     Cyril received the treatments listed below:  All exercises performed bilaterally     Cyril received therapeutic exercises to develop strength, endurance, ROM, flexibility, posture and core stabilization for 45 minutes including:    Upright Bike 5 mins level 8  Squat assessment  Education on lifting technique and single  arm/leg movements   Step Taps 6inch 3x10  Single Leg RDL 3x10  Single Leg Bridge on swiss ball 3x10  Lat Pulldown with emphasis on scapular depression 15x  D1 Extension with emphasis on scapular depression 15x  Lumbar Flexion Stretch 3 way with swiss ball [id number] each way   Side Plank with Abduction 3x5     HELD  Wall [place] 2x15  Glute Bridges 3x10  Bird/Dog 2x10  Trunk Rot Iso with Walkouts 3x5 50# each way   Elliptical 5 minutes level 5   Patty Pose and Half Kneeling Dorsifleion Stretch [id number]  Step Taps 6inch 3x10  Tempo squats 3x5  Single Leg MOBO with 5# KB 30x even and odd   Lateral Walks & Monster Walks green band 3x10    Cyril received the following manual therapy techniques: Joint mobilizations, Manual traction, Myofacial release, Soft tissue Mobilization and Friction Massage were applied to the: right upper trapezius for 0 minutes    Patient Education and Home Exercises     Home Exercises and Patient Education Provided     Education/Self-Care provided: (included in treatment) minutes   Patient educated on the impairments noted above and the effects of physical therapy intervention to improve overall condition and QOL.   Patient was educated on all the above exercise prior/during/after for proper posture, positioning, and execution for safe performance with home exercise program.  Exercise Prescription:   6/1/2022: EVAL- Low      Written Home Exercises Provided: yes. Prefers: Electronic Copies  Exercises were reviewed and Cyril was able to demonstrate them prior to the end of the session.  Cyril demonstrated good understanding of the education provided.      See EMR under Patient Instructions for exercises provided during therapy sessions.    ASSESSMENT     Cyril Carlton tolerated PT session well with minimal  complaints of pain or discomfort, secondary to poor motor control and decrease muscle edurance. Objective findings are improving with measurements of ROM and functional mobility.  Therapy exercises  were reviewed by revisiting exercises given from previous home exercise program while adding no new exercises.  Handouts were issued during today's visit. Cyril demonstrated good understanding of new exercises and will continue to progress at home until next follow-up.     Cyril Is progressing well towards his goals.   Pt prognosis is Excellent.     Pt will continue to benefit from skilled outpatient physical therapy to address the deficits listed in the problem list box on initial evaluation, provide pt/family education and to maximize pt's level of independence in the home and community environment.     Pt's spiritual, cultural and educational needs considered and pt agreeable to plan of care and goals.     Anticipated barriers to physical therapy: occupation    GOALS:  SHORT TERM GOALS: 4 weeks, (06/30/22) 6/1/2022   Recent signs and systems trend is improving in order to progress towards LTG's. PC    Patient will be independent with HEP in order to further progress and return to maximal function. PC     Pain rating at Worst: 5/10 in order to progress towards increased independence with activity. PC     Patient will be able to correct postural deviations in sitting and standing, to decrease pain and promote postural awareness for injury prevention.  PC        LONG TERM GOALS: 8 weeks, (07/30/22) 6/1/2022   Patient will return to normal ADL, recreational, and work related activities with less pain and limitation.  PC     Patient will improve AROM to stated goals in order to return to maximal functional potential.  PC     Patient will improve Strength to stated goals of appropriate musculature in order to improve functional independence.  PC     Pain Rating at Best: 1/10 to improve Quality of Life.      Patient will meet predicted functional outcome (FOTO) score: 18% to increase self-worth & perceived functional ability. PC     Patient will have met/partially met personal goal of: being able to squat > 225lbs  without back discomfort  PC        PLAN     Continue with physical therapy as planned.   2x weekly for 4 weeks.     Plan of care Certification: 6/1/2022 to 07/30/2022   Extension of Plan of Care thru 10/04/22    Shayne Brody, PT, DPT

## 2022-09-15 ENCOUNTER — TELEPHONE (OUTPATIENT)
Dept: PAIN MEDICINE | Facility: CLINIC | Age: 27
End: 2022-09-15
Payer: OTHER GOVERNMENT

## 2022-09-15 NOTE — TELEPHONE ENCOUNTER
Reached out to patient to reschedule appointment to a morning time in the same day because the provider will be in procedures.  Apt has been made . All questions answered.     Swapnil Negron  Medical Assistant

## 2022-09-16 ENCOUNTER — PATIENT MESSAGE (OUTPATIENT)
Dept: PHYSICAL MEDICINE AND REHAB | Facility: CLINIC | Age: 27
End: 2022-09-16
Payer: OTHER GOVERNMENT

## 2022-09-16 ENCOUNTER — CLINICAL SUPPORT (OUTPATIENT)
Dept: REHABILITATION | Facility: HOSPITAL | Age: 27
End: 2022-09-16
Payer: OTHER GOVERNMENT

## 2022-09-16 DIAGNOSIS — M62.89 MUSCLE TIGHTNESS: Primary | ICD-10-CM

## 2022-09-16 PROCEDURE — 97140 MANUAL THERAPY 1/> REGIONS: CPT

## 2022-09-16 PROCEDURE — 97110 THERAPEUTIC EXERCISES: CPT

## 2022-09-16 NOTE — PROGRESS NOTES
[facility]   Physical Therapy Treatment Note  + Updated Plan of Care     Name: Cyril Carlton  Clinic Number: [id number]    Therapy Diagnosis:   Encounter Diagnosis   Name Primary?    Muscle tightness Yes     Physician: Lon Thomas MD  Visit Date: 9/16/2022  Physician Orders: PT Eval and Treat  Medical Diagnosis from Referral: Back Pain   Evaluation Date: 6/1/2022  Authorization Period Expiration: 9/19/22  Plan of Care Expiration: 07/31/2022   (Extension 10/16/22)                      Progress Update:  Lasted performed 09/16/22                   FOTO: 1 / 3    Visit # / Visits authorized: 10/20 (+1 eval)     Time In: 0745  Time Out:0830  Total Billable Time: 40 minutes (denotes billable time)    Precautions: Standard    SUBJECTIVE     Pt reports: improvement in low back pain, but patient still complaints of stiffness in a.m. Unable to sleep or run comfortably     He was compliant with home exercise program.  Response to previous treatment: good  Functional change: none noted    Pain:  Current  2/10, worst 2-3 /10, best 0 /10   Location: Low Back     OBJECTIVE     Objective Measures updated at progress report unless specified.     Updated 09/16/22  Lumbar AROM/PROM Spine  Right   Left      Pain/Dysfunction with Movement Goal   Lumbar Flexion (60) 50%   Pain with Movement   100%  Initial: 75%   Lumbar Extension (30) 50%   Pain with Movement    100%  Initial:    Lumbar Side Bending (25) 75% 75% Pain with Movement    100%  Initial:   50% (B)   Lumbar Rotation 100% 100%   Pain Free  Initial:       Hip AROM/PROM Right  6/1/2022 Left  6/1/2022 Pain/Dysfunction with Movement Goal   Hip IR (45) 5 5 Discomfort   40  Initial:    Hip ER (45) 30 30   40  Initial:       TREATMENT     Cyril received the treatments listed below:  All exercises performed bilaterally     Cyril received therapeutic exercises to develop strength, endurance, ROM, flexibility, posture and core stabilization for 15 minutes  including:    Upright Bike 5 mins level 8  Squat assessment  Education on lifting technique and single arm/leg movements   Wall [place] 3x8  Pallof Press 2x15 green band     HELD  Wall [place] 2x15  Glute Bridges 3x10  Bird/Dog 2x10  Trunk Rot Iso with Walkouts 3x5 50# each way   Elliptical 5 minutes level 5   Patty Pose and Half Kneeling Dorsifleion Stretch [id number]  Step Taps 6inch 3x10  Tempo squats 3x5  Single Leg MOBO with 5# KB 30x even and odd   Lateral Walks & Monster Walks green band 3x10  Step Taps 6inch 3x10  Single Leg RDL 3x10  Single Leg Bridge on swiss ball 3x10  Lat Pulldown with emphasis on scapular depression 15x  D1 Extension with emphasis on scapular depression 15x  Lumbar Flexion Stretch 3 way with swiss ball [id number] each way   Side Plank with Abduction 3x5     Cyril received the following manual therapy techniques: Joint mobilizations, Manual traction, Myofacial release, Soft tissue Mobilization and Friction Massage were applied to the: right upper trapezius for 30 minutes  Soft tissue- bilateral hips and lumbar region  Functional dry needling to lumbar region   Mobilizations- hips     Patient Education and Home Exercises     Home Exercises and Patient Education Provided     Education/Self-Care provided: (included in treatment) minutes   Patient educated on the impairments noted above and the effects of physical therapy intervention to improve overall condition and QOL.   Patient was educated on all the above exercise prior/during/after for proper posture, positioning, and execution for safe performance with home exercise program.  Exercise Prescription:   6/1/2022: EVAL- Low      Written Home Exercises Provided: yes. Prefers: Electronic Copies  Exercises were reviewed and Cyril was able to demonstrate them prior to the end of the session.  Cyril demonstrated good understanding of the education provided.      See EMR under Patient Instructions for exercises provided during therapy  sessions.    ASSESSMENT     Cyril Carlton tolerated PT session well with minimal  complaints of pain or discomfort, secondary to poor motor control and decrease muscle edurance. Objective findings are improving with measurements of ROM and functional mobility.  Therapy exercises were reviewed by revisiting exercises given from previous home exercise program while adding no new exercises.  Handouts were issued during today's visit. Cyril demonstrated good understanding of new exercises and will continue to progress at home until next follow-up.     Cyril Is progressing well towards his goals.   Pt prognosis is Excellent.     Pt will continue to benefit from skilled outpatient physical therapy to address the deficits listed in the problem list box on initial evaluation, provide pt/family education and to maximize pt's level of independence in the home and community environment.     Pt's spiritual, cultural and educational needs considered and pt agreeable to plan of care and goals.     Anticipated barriers to physical therapy: occupation    GOALS:  SHORT TERM GOALS: 4 weeks, (06/30/22) 6/1/2022   Recent signs and systems trend is improving in order to progress towards LTG's. PC    Patient will be independent with HEP in order to further progress and return to maximal function. PC     Pain rating at Worst: 5/10 in order to progress towards increased independence with activity. PC     Patient will be able to correct postural deviations in sitting and standing, to decrease pain and promote postural awareness for injury prevention.  PC        LONG TERM GOALS: 8 weeks, (07/30/22) 6/1/2022   Patient will return to normal ADL, recreational, and work related activities with less pain and limitation.  PC     Patient will improve AROM to stated goals in order to return to maximal functional potential.  PC     Patient will improve Strength to stated goals of appropriate musculature in order to improve functional independence.  PC      Pain Rating at Best: 1/10 to improve Quality of Life.      Patient will meet predicted functional outcome (FOTO) score: 18% to increase self-worth & perceived functional ability. PC     Patient will have met/partially met personal goal of: being able to squat > 225lbs without back discomfort  PC        PLAN     Continue with physical therapy as planned.     Plan of care Certification: 6/1/2022 to 07/30/2022   Extension of Plan of Care thru 10/16/22  2x week 4 weeks     Shayne Brody, PT, DPT

## 2022-09-19 DIAGNOSIS — M53.3 SACROILIAC JOINT PAIN: ICD-10-CM

## 2022-09-19 DIAGNOSIS — M51.36 DDD (DEGENERATIVE DISC DISEASE), LUMBAR: Primary | ICD-10-CM

## 2022-09-19 RX ORDER — TIZANIDINE 2 MG/1
2 TABLET ORAL NIGHTLY PRN
Qty: 30 TABLET | Refills: 1 | Status: SHIPPED | OUTPATIENT
Start: 2022-09-19 | End: 2023-02-28

## 2022-09-29 ENCOUNTER — TELEPHONE (OUTPATIENT)
Dept: PAIN MEDICINE | Facility: CLINIC | Age: 27
End: 2022-09-29
Payer: OTHER GOVERNMENT

## 2022-10-17 ENCOUNTER — OFFICE VISIT (OUTPATIENT)
Dept: INTERNAL MEDICINE | Facility: CLINIC | Age: 27
End: 2022-10-17
Payer: OTHER GOVERNMENT

## 2022-10-17 DIAGNOSIS — F43.29 STRESS AND ADJUSTMENT REACTION: Primary | ICD-10-CM

## 2022-10-17 DIAGNOSIS — Z65.8 RELATIONAL PROBLEM: ICD-10-CM

## 2022-10-17 DIAGNOSIS — R41.840 DIFFICULTY CONCENTRATING: ICD-10-CM

## 2022-10-17 PROCEDURE — 99214 PR OFFICE/OUTPT VISIT, EST, LEVL IV, 30-39 MIN: ICD-10-PCS | Mod: 95,,, | Performed by: FAMILY MEDICINE

## 2022-10-17 PROCEDURE — 99214 OFFICE O/P EST MOD 30 MIN: CPT | Mod: 95,,, | Performed by: FAMILY MEDICINE

## 2022-10-17 RX ORDER — HYDROXYZINE PAMOATE 25 MG/1
25 CAPSULE ORAL EVERY 8 HOURS PRN
Qty: 60 CAPSULE | Refills: 0 | Status: SHIPPED | OUTPATIENT
Start: 2022-10-17 | End: 2022-10-31 | Stop reason: SDUPTHER

## 2022-10-17 RX ORDER — CHOLECALCIFEROL (VITAMIN D3) 125 MCG
1 CAPSULE ORAL DAILY
COMMUNITY
Start: 2022-10-05 | End: 2023-02-28

## 2022-10-17 NOTE — PROGRESS NOTES
Subjective:   Patient ID: Cyril Carlton is a 27 y.o. male.  Chief Complaint:  No chief complaint on file.    The patient location is: Work  The chief complaint leading to consultation is:  Stress, anxiety, and difficulty focusing and concentrating    Visit type: audiovisual    Face to Face time with patient: 20  30 minutes of total time spent on the encounter, which includes face to face time and non-face to face time preparing to see the patient (eg, review of tests), Obtaining and/or reviewing separately obtained history, Documenting clinical information in the electronic or other health record, Independently interpreting results (not separately reported) and communicating results to the patient/family/caregiver, or Care coordination (not separately reported).     Each patient to whom he or she provides medical services by telemedicine is:  (1) informed of the relationship between the physician and patient and the respective role of any other health care provider with respect to management of the patient; and (2) notified that he or she may decline to receive medical services by telemedicine and may withdraw from such care at any time.      Last visit August 2022 for annual physical exam   Blood Counts are normal. No significant anemia.  Sugar, Kidney, Liver, and Electrolyte tests are all normal.  Cholesterol tests are normal. 10-year risk of a heart disease or stroke is Low. Aspirin or Statin cholesterol medications are not recommended.  A1c is in a normal range. No Prediabtes or Diabetes  Thyroid testing is normal.  Hepatitis-C and HIV screens negative    Scheduled visit today to discuss recent stressors in life   Having relationship difficulty with girlfriend   Resulting in difficulty focusing concentrating  Reports similar type difficulty during stressful situation is teenager   Was prescribed a medication for approximately 4-6 weeks   He is unsure of the name  Says he took it regularly for 2 weeks, then only as  needed, then discontinued it  Feels like symptoms are bad enough this time that he needs to restart a medication    No additional complaints concerns today.          Current Outpatient Medications:     cholecalciferol, vitamin D3, 125 mcg (5,000 unit) capsule, Take 1 capsule by mouth once daily., Disp: , Rfl:     hydrOXYzine pamoate (VISTARIL) 25 MG Cap, Take 1 capsule (25 mg total) by mouth every 8 (eight) hours as needed (for Anxiety)., Disp: 60 capsule, Rfl: 0    tiZANidine (ZANAFLEX) 2 MG tablet, Take 1 tablet (2 mg total) by mouth nightly as needed (muscle spasms)., Disp: 30 tablet, Rfl: 1    Review of Systems   Constitutional:  Positive for fatigue. Negative for activity change, appetite change, chills, diaphoresis, fever and unexpected weight change.   HENT:  Negative for hearing loss, rhinorrhea and trouble swallowing.    Eyes:  Negative for discharge and visual disturbance.   Respiratory:  Negative for chest tightness, shortness of breath and wheezing.    Cardiovascular:  Negative for chest pain, palpitations and leg swelling.   Gastrointestinal:  Negative for abdominal pain, blood in stool, constipation, diarrhea, nausea and vomiting.   Endocrine: Negative for polydipsia and polyuria.   Genitourinary:  Negative for difficulty urinating, hematuria and urgency.   Musculoskeletal:  Negative for arthralgias, back pain, joint swelling, myalgias and neck pain.   Skin:  Negative for rash.   Neurological:  Negative for dizziness, weakness, light-headedness and headaches.   Psychiatric/Behavioral:  Positive for decreased concentration and sleep disturbance. Negative for agitation, behavioral problems, confusion, dysphoric mood, hallucinations, self-injury and suicidal ideas. The patient is nervous/anxious. The patient is not hyperactive.      Objective:   There were no vitals taken for this visit.    Physical Exam  Constitutional:       Appearance: Normal appearance. He is well-developed.   Neurological:       Mental Status: He is alert.   Psychiatric:         Attention and Perception: He is inattentive.         Mood and Affect: Affect normal. Mood is anxious.         Speech: Speech is rapid and pressured. Speech is not tangential.         Behavior: Behavior normal. Behavior is not slowed, withdrawn or hyperactive. Behavior is cooperative.         Thought Content: Thought content normal. Thought content is not paranoid or delusional.         Cognition and Memory: Cognition and memory normal.         Judgment: Judgment normal.       Assessment:       ICD-10-CM ICD-9-CM   1. Stress and adjustment reaction  F43.29 309.89   2. Relational problem  Z65.8 V62.81   3. Difficulty concentrating  R41.840 799.51     Plan:   Stress and adjustment reaction  Relational problem  Difficulty concentrating  -     hydrOXYzine pamoate (VISTARIL) 25 MG Cap; Take 1 capsule (25 mg total) by mouth every 8 (eight) hours as needed (for Anxiety).  Dispense: 60 capsule; Refill: 0    Recommend trial of Vistaril 25 mg in the morning and at bedtime   May take an additional dose in the late afternoon early evening if needed   Patient expresses agreement understanding to the above plan  Reassess 2 weeks  Okay for telemedicine visit if patient prefers

## 2022-10-21 ENCOUNTER — CLINICAL SUPPORT (OUTPATIENT)
Dept: REHABILITATION | Facility: HOSPITAL | Age: 27
End: 2022-10-21
Payer: OTHER GOVERNMENT

## 2022-10-21 DIAGNOSIS — M62.89 MUSCLE TIGHTNESS: Primary | ICD-10-CM

## 2022-10-21 PROCEDURE — 97110 THERAPEUTIC EXERCISES: CPT

## 2022-10-21 PROCEDURE — 97140 MANUAL THERAPY 1/> REGIONS: CPT

## 2022-10-21 NOTE — PROGRESS NOTES
[facility]   Physical Therapy Treatment Note  + Updated Plan of Care     Name: Cyril Carlton  Clinic Number: [id number]    Therapy Diagnosis:   Encounter Diagnosis   Name Primary?    Muscle tightness Yes     Physician: Lon Thomas MD  Visit Date: 10/21/2022  Physician Orders: PT Eval and Treat  Medical Diagnosis from Referral: Back Pain   Evaluation Date: 6/1/2022  Authorization Period Expiration: 10/30/22  Plan of Care Expiration: 07/31/2022   (Extension 11/16/22)                      Progress Update:  Lasted performed 10/21/22                   FOTO: 1 / 3    Visit # / Visits authorized: 11/20 (+1 eval)     Time In: 0745  Time Out:0830  Total Billable Time: 40 minutes (denotes billable time)    Precautions: Standard    SUBJECTIVE     Pt reports: improvement in low back pain, but patient still complaints of stiffness in a.m. Unable to sleep or run comfortably     He was compliant with home exercise program.  Response to previous treatment: good  Functional change: none noted    Pain:  Current  2/10, worst 2-3 /10, best 0 /10   Location: Low Back     OBJECTIVE     Objective Measures updated at progress report unless specified.     Updated 10/21/22  Lumbar AROM/PROM Spine  Right  10/21/22 Left      Pain/Dysfunction with Movement Goal   Lumbar Flexion (60) 75%   Pain with Movement   100%  Initial: 50%   Lumbar Extension (30) 75%     100%  Initial: 50%   Lumbar Side Bending (25) 75% 75% Pain with Movement    100%  Initial:   50% (B)   Lumbar Rotation 100% 100%   Pain Free  Initial:       Hip AROM/PROM Right  6/1/2022 Left  6/1/2022 Pain/Dysfunction with Movement Goal   Hip IR (45) 5 5 Discomfort   40  Initial:    Hip ER (45) 30 30   40  Initial:       TREATMENT     Cyril received the treatments listed below:  All exercises performed bilaterally     Cyril received therapeutic exercises to develop strength, endurance, ROM, flexibility, posture and core stabilization for 15 minutes  including:    Upright Bike 5 mins level 8  Squat assessment  Education on lifting technique and single arm/leg movements   Wall Polson 3x8  Pallof Press 2x15 green band     HELD  Wall Polson 2x15  Glute Bridges 3x10  Bird/Dog 2x10  Trunk Rot Iso with Walkouts 3x5 50# each way   Elliptical 5 minutes level 5   Patty Pose and Half Kneeling Dorsifleion Stretch [id number]  Step Taps 6inch 3x10  Tempo squats 3x5  Single Leg MOBO with 5# KB 30x even and odd   Lateral Walks & Monster Walks green band 3x10  Step Taps 6inch 3x10  Single Leg RDL 3x10  Single Leg Bridge on swiss ball 3x10  Lat Pulldown with emphasis on scapular depression 15x  D1 Extension with emphasis on scapular depression 15x  Lumbar Flexion Stretch 3 way with swiss ball [id number] each way   Side Plank with Abduction 3x5     Cyril received the following manual therapy techniques: Joint mobilizations, Manual traction, Myofacial release, Soft tissue Mobilization and Friction Massage were applied to the: right upper trapezius for 30 minutes    Soft tissue- bilateral hips and lumbar region  Functional dry needling to lumbar region   Mobilizations- hips     Patient Education and Home Exercises     Home Exercises and Patient Education Provided     Education/Self-Care provided: (included in treatment) minutes   Patient educated on the impairments noted above and the effects of physical therapy intervention to improve overall condition and QOL.   Patient was educated on all the above exercise prior/during/after for proper posture, positioning, and execution for safe performance with home exercise program.  Exercise Prescription:   6/1/2022: EVAL- Low      Written Home Exercises Provided: yes. Prefers: Electronic Copies  Exercises were reviewed and Cyril was able to demonstrate them prior to the end of the session.  Cyril demonstrated good understanding of the education provided.      See EMR under Patient Instructions for exercises provided during therapy  sessions.    ASSESSMENT     Cyril Carlton tolerated PT session well with minimal complaints of pain or discomfort, secondary to poor motor control and decrease muscle edurance. Objective findings are improving with measurements of ROM and functional mobility. Therapy exercises were reviewed by revisiting exercises given from previous home exercise program while adding no new exercises.  Handouts were issued during today's visit. Cyril demonstrated good understanding of new exercises and will continue to progress at home until next follow-up.     Cyril Is progressing well towards his goals.   Pt prognosis is Excellent.     Pt will continue to benefit from skilled outpatient physical therapy to address the deficits listed in the problem list box on initial evaluation, provide pt/family education and to maximize pt's level of independence in the home and community environment.     Pt's spiritual, cultural and educational needs considered and pt agreeable to plan of care and goals.     Anticipated barriers to physical therapy: occupation    GOALS:  SHORT TERM GOALS: 4 weeks, (06/30/22) 6/1/2022   Recent signs and systems trend is improving in order to progress towards LTG's. PC    Patient will be independent with HEP in order to further progress and return to maximal function. PC     Pain rating at Worst: 5/10 in order to progress towards increased independence with activity. PC     Patient will be able to correct postural deviations in sitting and standing, to decrease pain and promote postural awareness for injury prevention.  PC        LONG TERM GOALS: 8 weeks, (07/30/22) 6/1/2022   Patient will return to normal ADL, recreational, and work related activities with less pain and limitation.  PC     Patient will improve AROM to stated goals in order to return to maximal functional potential.  PC     Patient will improve Strength to stated goals of appropriate musculature in order to improve functional independence.  PC      Pain Rating at Best: 1/10 to improve Quality of Life.      Patient will meet predicted functional outcome (FOTO) score: 18% to increase self-worth & perceived functional ability. PC     Patient will have met/partially met personal goal of: being able to squat > 225lbs without back discomfort  PC        PLAN     Continue with physical therapy as planned.     Plan of care Certification: 6/1/2022 to 07/30/2022   Extension of Plan of Care thru 11/21/22  2x week 4 weeks     Shayne Brody, PT, DPT

## 2022-10-31 ENCOUNTER — OFFICE VISIT (OUTPATIENT)
Dept: INTERNAL MEDICINE | Facility: CLINIC | Age: 27
End: 2022-10-31
Payer: OTHER GOVERNMENT

## 2022-10-31 DIAGNOSIS — F43.29 STRESS AND ADJUSTMENT REACTION: Primary | ICD-10-CM

## 2022-10-31 DIAGNOSIS — Z65.8 RELATIONAL PROBLEM: ICD-10-CM

## 2022-10-31 DIAGNOSIS — R41.840 DIFFICULTY CONCENTRATING: ICD-10-CM

## 2022-10-31 PROCEDURE — 99214 OFFICE O/P EST MOD 30 MIN: CPT | Mod: 95,,, | Performed by: FAMILY MEDICINE

## 2022-10-31 PROCEDURE — 99214 PR OFFICE/OUTPT VISIT, EST, LEVL IV, 30-39 MIN: ICD-10-PCS | Mod: 95,,, | Performed by: FAMILY MEDICINE

## 2022-10-31 RX ORDER — HYDROXYZINE PAMOATE 25 MG/1
25 CAPSULE ORAL EVERY 8 HOURS PRN
Qty: 60 CAPSULE | Refills: 0 | Status: SHIPPED | OUTPATIENT
Start: 2022-10-31 | End: 2023-02-28

## 2022-10-31 RX ORDER — VENLAFAXINE HYDROCHLORIDE 75 MG/1
75 CAPSULE, EXTENDED RELEASE ORAL DAILY
Qty: 30 CAPSULE | Refills: 2 | Status: SHIPPED | OUTPATIENT
Start: 2022-10-31 | End: 2022-11-28 | Stop reason: SDUPTHER

## 2022-10-31 NOTE — PROGRESS NOTES
Subjective:   Patient ID: Cyril Carlton is a 27 y.o. male.  Chief Complaint:  No chief complaint on file.    The patient location is: Work  The chief complaint leading to consultation is: Anxiety Follow Up    Visit type: audiovisual    Face to Face time with patient: 20 minutes  30 minutes of total time spent on the encounter, which includes face to face time and non-face to face time preparing to see the patient (eg, review of tests), Obtaining and/or reviewing separately obtained history, Documenting clinical information in the electronic or other health record, Independently interpreting results (not separately reported) and communicating results to the patient/family/caregiver, or Care coordination (not separately reported).     Each patient to whom he or she provides medical services by telemedicine is:  (1) informed of the relationship between the physician and patient and the respective role of any other health care provider with respect to management of the patient; and (2) notified that he or she may decline to receive medical services by telemedicine and may withdraw from such care at any time.    Last visit via telemedicine 10/17/2022   Stress and adjustment reaction  Relational problem  Difficulty concentrating  Recommend trial of Vistaril 25 mg in the morning and at bedtime   May take an additional dose in the late afternoon early evening if needed   Reports medication effective for control on anxiety symptoms  Denies side effects   Number required 3 times a day dose, and was up until this week able to decrease to daily as needed  However, current relationship is likely over is starting to have more increased symptoms when depressive type nature  Questions if he needs another type of medication to help with those symptoms  Denies any previous treatment for depression  In additional medication, is also interested in seeing a counselor for therapy    Mental Health Screening Questionnaires:  Patient Health  Questionnaire (PHQ-9)  scale       19  Generalized Anxiety Disorder (BHARATH-7) scale       8  Mood Disorder Questionnaire                               Negative          Current Outpatient Medications:     cholecalciferol, vitamin D3, 125 mcg (5,000 unit) capsule, Take 1 capsule by mouth once daily., Disp: , Rfl:     hydrOXYzine pamoate (VISTARIL) 25 MG Cap, Take 1 capsule (25 mg total) by mouth every 8 (eight) hours as needed (for Anxiety)., Disp: 60 capsule, Rfl: 0    tiZANidine (ZANAFLEX) 2 MG tablet, Take 1 tablet (2 mg total) by mouth nightly as needed (muscle spasms)., Disp: 30 tablet, Rfl: 1    venlafaxine (EFFEXOR-XR) 75 MG 24 hr capsule, Take 1 capsule (75 mg total) by mouth once daily., Disp: 30 capsule, Rfl: 2    Review of Systems   Constitutional:  Positive for fatigue. Negative for activity change, appetite change, chills, diaphoresis, fever and unexpected weight change.   HENT:  Negative for hearing loss, rhinorrhea and trouble swallowing.    Eyes:  Negative for discharge and visual disturbance.   Respiratory:  Negative for chest tightness, shortness of breath and wheezing.    Cardiovascular:  Negative for chest pain, palpitations and leg swelling.   Gastrointestinal:  Negative for abdominal pain, blood in stool, constipation, diarrhea, nausea and vomiting.   Endocrine: Negative for polydipsia and polyuria.   Genitourinary:  Negative for difficulty urinating, hematuria and urgency.   Musculoskeletal:  Negative for arthralgias, back pain, joint swelling, myalgias and neck pain.   Skin:  Negative for rash.   Neurological:  Negative for dizziness, weakness, light-headedness and headaches.   Psychiatric/Behavioral:  Positive for agitation, decreased concentration, dysphoric mood and sleep disturbance. Negative for behavioral problems, confusion, hallucinations, self-injury and suicidal ideas. The patient is nervous/anxious. The patient is not hyperactive.      Objective:   Physical Exam  Constitutional:        Appearance: Normal appearance. He is well-developed.   Neurological:      Mental Status: He is alert.   Psychiatric:         Attention and Perception: Attention normal. He is attentive.         Mood and Affect: Mood is depressed. Affect is flat.         Speech: Speech is delayed. Speech is not rapid and pressured or tangential.         Behavior: Behavior is slowed and withdrawn.         Thought Content: Thought content normal. Thought content is not paranoid or delusional. Thought content does not include homicidal or suicidal ideation.         Cognition and Memory: Cognition and memory normal.         Judgment: Judgment normal.       Assessment:       ICD-10-CM ICD-9-CM   1. Stress and adjustment reaction  F43.29 309.89   2. Relational problem  Z65.8 V62.81   3. Difficulty concentrating  R41.840 799.51     Plan:   Stress and adjustment reaction  Relational problem  Difficulty concentrating  -     venlafaxine (EFFEXOR-XR) 75 MG 24 hr capsule; Take 1 capsule (75 mg total) by mouth once daily.  Dispense: 30 capsule; Refill: 2  -     hydrOXYzine pamoate (VISTARIL) 25 MG Cap; Take 1 capsule (25 mg total) by mouth every 8 (eight) hours as needed (for Anxiety).  Dispense: 60 capsule; Refill: 0  -     Ambulatory referral/consult to Psychology; Future; Expected date: 11/07/2022    Discussed/reviewed positive screening questionnaire for depression more than anxiety   Agreeable to starting medication  Effexor XR 75 mg daily   Discussed black box warning, side effects, and expected response to treatment with patient  Okay to continue Vistaril as needed   Return to clinic 3-4 weeks   Based on response, can consider increased dose if needed at that time   Psychology referral ordered  Patient expresses agreement and understanding to the above plan

## 2022-11-01 ENCOUNTER — PATIENT MESSAGE (OUTPATIENT)
Dept: INTERNAL MEDICINE | Facility: CLINIC | Age: 27
End: 2022-11-01
Payer: OTHER GOVERNMENT

## 2022-11-28 ENCOUNTER — OFFICE VISIT (OUTPATIENT)
Dept: INTERNAL MEDICINE | Facility: CLINIC | Age: 27
End: 2022-11-28
Payer: OTHER GOVERNMENT

## 2022-11-28 VITALS
BODY MASS INDEX: 27.76 KG/M2 | SYSTOLIC BLOOD PRESSURE: 104 MMHG | HEART RATE: 93 BPM | TEMPERATURE: 97 F | WEIGHT: 183.19 LBS | DIASTOLIC BLOOD PRESSURE: 66 MMHG | RESPIRATION RATE: 18 BRPM | HEIGHT: 68 IN | OXYGEN SATURATION: 98 %

## 2022-11-28 DIAGNOSIS — M54.9 CHRONIC NECK AND BACK PAIN: ICD-10-CM

## 2022-11-28 DIAGNOSIS — M54.2 CHRONIC NECK AND BACK PAIN: ICD-10-CM

## 2022-11-28 DIAGNOSIS — F43.29 STRESS AND ADJUSTMENT REACTION: Primary | ICD-10-CM

## 2022-11-28 DIAGNOSIS — Z65.8 RELATIONAL PROBLEM: ICD-10-CM

## 2022-11-28 DIAGNOSIS — R41.840 DIFFICULTY CONCENTRATING: ICD-10-CM

## 2022-11-28 DIAGNOSIS — G89.29 CHRONIC NECK AND BACK PAIN: ICD-10-CM

## 2022-11-28 PROCEDURE — 99214 PR OFFICE/OUTPT VISIT, EST, LEVL IV, 30-39 MIN: ICD-10-PCS | Mod: S$PBB,,, | Performed by: FAMILY MEDICINE

## 2022-11-28 PROCEDURE — 99214 OFFICE O/P EST MOD 30 MIN: CPT | Mod: S$PBB,,, | Performed by: FAMILY MEDICINE

## 2022-11-28 PROCEDURE — 99999 PR PBB SHADOW E&M-EST. PATIENT-LVL IV: CPT | Mod: PBBFAC,,, | Performed by: FAMILY MEDICINE

## 2022-11-28 PROCEDURE — 99214 OFFICE O/P EST MOD 30 MIN: CPT | Mod: PBBFAC | Performed by: FAMILY MEDICINE

## 2022-11-28 PROCEDURE — 99999 PR PBB SHADOW E&M-EST. PATIENT-LVL IV: ICD-10-PCS | Mod: PBBFAC,,, | Performed by: FAMILY MEDICINE

## 2022-11-28 RX ORDER — VENLAFAXINE HYDROCHLORIDE 75 MG/1
75 CAPSULE, EXTENDED RELEASE ORAL DAILY
Qty: 90 CAPSULE | Refills: 3 | Status: SHIPPED | OUTPATIENT
Start: 2022-11-28 | End: 2023-02-28

## 2022-12-07 ENCOUNTER — PATIENT MESSAGE (OUTPATIENT)
Dept: PSYCHIATRY | Facility: CLINIC | Age: 27
End: 2022-12-07

## 2022-12-07 ENCOUNTER — OFFICE VISIT (OUTPATIENT)
Dept: PSYCHIATRY | Facility: CLINIC | Age: 27
End: 2022-12-07
Payer: OTHER GOVERNMENT

## 2022-12-07 DIAGNOSIS — F43.29 STRESS AND ADJUSTMENT REACTION: ICD-10-CM

## 2022-12-07 DIAGNOSIS — Z65.8 RELATIONAL PROBLEM: ICD-10-CM

## 2022-12-07 DIAGNOSIS — R41.840 DIFFICULTY CONCENTRATING: ICD-10-CM

## 2022-12-07 PROCEDURE — 90791 PSYCH DIAGNOSTIC EVALUATION: CPT | Mod: 95,,, | Performed by: SOCIAL WORKER

## 2022-12-07 PROCEDURE — 90791 PR PSYCHIATRIC DIAGNOSTIC EVALUATION: ICD-10-PCS | Mod: 95,,, | Performed by: SOCIAL WORKER

## 2022-12-11 ENCOUNTER — PATIENT MESSAGE (OUTPATIENT)
Dept: PSYCHIATRY | Facility: CLINIC | Age: 27
End: 2022-12-11
Payer: OTHER GOVERNMENT

## 2022-12-12 NOTE — PROGRESS NOTES
Psychiatry Initial Visit (PhD/LCSW)  Diagnostic Interview - CPT 82115    Patient's stated location at the time of visit: PS[facility] Televisit Pt Location: home/residence in the [facility]    Each patient provided medical services by telemedicine is: (1) informed of the relationship between the provider and patient and the respective role of any other health care provider with respect to management of the patient; and (2) notified that he or she may decline to receive medical services by telemedicine and may withdraw from such care at any time.    Crisis Disclaimer: Patient was informed that due to the virtual nature of the visit, that if a crisis develops, protocols will be implemented to ensure patient safety, including but not limited to: (1) Initiating a welfare check with local Law Enforcement, (2) Calling [facility]/National Crisis Hotline, and/or (3) Initiating PEC/CEC procedures. Virtual visit with synchronous audio and video.    Date: 12/7/2022    Site: [facility]    Referral source: Dr. Lon Thomas MD    Clinical status of patient: Outpatient    Cyril Carlton, a 27 y.o. male, for initial evaluation visit.  Met with patient.    Chief complaint/reason for encounter: depression, anxiety, and interpersonal    PHQ-9 Depression Patient Health Questionnaire 12/5/2022   Patient agreed to terms: Yes   Little interest or pleasure in doing things 0   Feeling down, depressed, or hopeless 0   Trouble falling or staying asleep, or sleeping too much 1   Feeling tired or having little energy 0   Poor appetite or overeating 0   Feeling bad about yourself - or that you are a failure or have let yourself or your family down 0   Trouble concentrating on things, such as reading the newspaper or watching television 0   Moving or speaking so slowly that other people could have noticed. Or the opposite - being so fidgety or restless that you have been moving around a lot more than usual 0   Thoughts that you would be better off dead,  or of hurting yourself in some way 0   PHQ-9 Total Score 1   If you checked off any problems, how difficult have these problems made it for you to do your work, take care of things at home, or get along with other people? Not difficult at all   Interpretation Minimal or None       No flowsheet data found.    History of present illness:  Met with this patient for his online virtual initial counseling appointment.  The patient was sitting in his car throughout the appointment.  The patient stated that he is presenting for counseling due to depression related to the ending of a long-term relationship.  The patient stated that he works for the [employer] in [place].  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  He states that he joined the [employer] in 2018 after graduating from college with a degree in electrical engineering.  He stated that being in the [employer] and an officer on a [email or web address] fulfilled a dream that he had had for many years.  But after 3 years on the [email or web address] he realized that he did not want to reenlist  list when his 5 years is over.  The patient is originally from [place] and stated that he has 1 older sister.  He is not  and during his time living in [place], he became acquainted with the sister 1 of his fellow [employer] officers.  He was introduced to his friend's sister via e-mail and began their relationship talking, texting and emailing.  They did not meet for several months but eventually she came to visit her brother and they met and started having a relationship.  The relationship existed in the form of long weekend visits between [place] and [place] over the course of approximately 1 year.  This might be the most committed and longest relationship the patient has had.  He admits that he has had issues with communicating and also admits that over the course of the time that he dated this  woman, the relationship did not grow but became stale and distant.  The woman eventually broke up with him leaving him confused and depressed.  A fellow co-worker in [place] who is in a good marriage began to share some books and wisdom with the patient.  He read books on grief and relationships and has made himself a power point of all the things he has learned.  He now wants to enter into therapy to explore what he could have done differently.  He is hopeful that he can reconnect with the girlfriend.  This may be unrealistic and he admitted that he has been told this by other people.  The patient stated that he would make a follow-up appointment.    Pain: noncontributory    Symptoms:   Mood: diminished interest and insomnia  Anxiety: excessive anxiety/worry, restlessness/keyed up, and irritability  Substance abuse: denied  Cognitive functioning: denied  Health behaviors: noncontributory    Psychiatric history: psychotropic management by PCP    Medical history:   Past Medical History:   Diagnosis Date    Patient denies medical problems        Family history of psychiatric illness:   Family History   Problem Relation Age of Onset    No Known Problems Mother     No Known Problems Father     No Known Problems Sister     Diabetes Maternal Grandmother     Diabetes Paternal Grandmother     Diabetes Paternal Grandfather         Social history (marriage, employment, etc.): This patient is single and living in [place] for 2 years to finish his time in the [employer]. He is originally from [place] and plans to return there after his tour of duty for the [employer] ends. The patient is of  decent whose parents immigrated to the [place] as young  adults before children. 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 He has one sister, also unmarried.  Social History     Social History Narrative    Not on file        Substance use:      Social History     Tobacco Use    Smoking status: Never    Smokeless tobacco: Never   Substance Use Topics    Alcohol use: Yes     Alcohol/week: 2.0 standard drinks     Types: 2 Cans of beer per week        Current medications and drug reactions (include OTC, herbal):    Current Outpatient Medications:     cholecalciferol, vitamin D3, 125 mcg (5,000 unit) capsule, Take 1 capsule by mouth once daily., Disp: , Rfl:     hydrOXYzine pamoate (VISTARIL) 25 MG Cap, Take 1 capsule (25 mg total) by mouth every 8 (eight) hours as needed (for Anxiety)., Disp: 60 capsule, Rfl: 0    tiZANidine (ZANAFLEX) 2 MG tablet, Take 1 tablet (2 mg total) by mouth nightly as needed (muscle spasms)., Disp: 30 tablet, Rfl: 1    venlafaxine (EFFEXOR-XR) 75 MG 24 hr capsule, Take 1 capsule (75 mg total) by mouth once daily., Disp: 90 capsule, Rfl: 3      Strengths and liabilities: Strength: Patient accepts guidance/feedback, Strength: Patient is expressive/articulate., Liability: Patient lacks social skills., Liability: Patient lacks coping skills.    Current Evaluation:     Mental Status Exam:  General Appearance:  unremarkable, age appropriate   Speech: normal tone, normal rate, normal pitch, normal volume      Level of Cooperation: cooperative      Thought Processes: normal and logical   Mood: steady      Thought Content: normal, no suicidality, no homicidality, delusions, or paranoia   Affect: congruent and appropriate   Orientation: Oriented x3   Memory: recent >  intact   Attention Span & Concentration: intact   Fund of General Knowledge: intact and appropriate to age and level of education   Abstract Reasoning: interpretation of similarities was abstract   Judgment & Insight: fair     Language  intact     Diagnostic Impression - Plan:       ICD-10-CM ICD-9-CM   1. Stress and adjustment reaction  F43.29 309.89   2. Relational problem  Z65.8 V62.81   3. Difficulty concentrating  R41.840 799.51       Plan:individual  psychotherapy    Return to Clinic: as scheduled    Length of Service (minutes): 60       Cherry Martinez LCSW  12/13/2022   5:12 PM

## 2022-12-20 ENCOUNTER — OFFICE VISIT (OUTPATIENT)
Dept: INTERNAL MEDICINE | Facility: CLINIC | Age: 27
End: 2022-12-20
Payer: OTHER GOVERNMENT

## 2022-12-20 DIAGNOSIS — J02.9 SORE THROAT: Primary | ICD-10-CM

## 2022-12-20 PROBLEM — M62.89 MUSCLE TIGHTNESS: Status: RESOLVED | Noted: 2022-06-07 | Resolved: 2022-12-20

## 2022-12-20 PROCEDURE — 99213 OFFICE O/P EST LOW 20 MIN: CPT | Mod: 95,,, | Performed by: FAMILY MEDICINE

## 2022-12-20 PROCEDURE — 99213 PR OFFICE/OUTPT VISIT, EST, LEVL III, 20-29 MIN: ICD-10-PCS | Mod: 95,,, | Performed by: FAMILY MEDICINE

## 2022-12-20 RX ORDER — PREDNISONE 50 MG/1
50 TABLET ORAL DAILY
Qty: 5 TABLET | Refills: 0 | Status: SHIPPED | OUTPATIENT
Start: 2022-12-20 | End: 2022-12-25

## 2022-12-20 NOTE — PROGRESS NOTES
Subjective:   Patient ID: Cyril Carlton is a 27 y.o. male.  Chief Complaint:  Sore Throat    The patient location is: Work  The chief complaint leading to consultation is: Sore Throat    Visit type: audiovisual    Face to Face time with patient: 10 minutes  15 minutes of total time spent on the encounter, which includes face to face time and non-face to face time preparing to see the patient (eg, review of tests), Obtaining and/or reviewing separately obtained history, Documenting clinical information in the electronic or other health record, Independently interpreting results (not separately reported) and communicating results to the patient/family/caregiver, or Care coordination (not separately reported).     Each patient to whom he or she provides medical services by telemedicine is:  (1) informed of the relationship between the physician and patient and the respective role of any other health care provider with respect to management of the patient; and (2) notified that he or she may decline to receive medical services by telemedicine and may withdraw from such care at any time.    Notes:       Sore Throat   This is a new problem. The current episode started 1 to 4 weeks ago. The problem has been waxing and waning. Neither side of throat is experiencing more pain than the other. There has been no fever. The fever has been present for Less than 1 day. The pain is at a severity of 2/10. The pain is mild. Pertinent negatives include no abdominal pain, congestion, coughing, diarrhea, drooling, ear discharge, ear pain, headaches, hoarse voice, plugged ear sensation, neck pain, shortness of breath, stridor, swollen glands, trouble swallowing or vomiting. He has had no exposure to strep or mono. He has tried NSAIDs, acetaminophen, cool liquids and gargles for the symptoms. The treatment provided mild relief.     Review of Systems   Constitutional:  Negative for chills, fatigue and fever.   HENT:  Positive for sore  throat. Negative for congestion, drooling, ear discharge, ear pain, hoarse voice, postnasal drip, rhinorrhea, sinus pressure, trouble swallowing and voice change.    Eyes:  Negative for discharge, itching and visual disturbance.   Respiratory:  Negative for cough, chest tightness, shortness of breath, wheezing and stridor.    Cardiovascular:  Negative for chest pain and leg swelling.   Gastrointestinal:  Negative for abdominal pain, diarrhea, nausea and vomiting.   Genitourinary:  Negative for difficulty urinating.   Musculoskeletal:  Negative for myalgias, neck pain and neck stiffness.   Skin:  Negative for rash.   Neurological:  Negative for dizziness, weakness, light-headedness and headaches.   Hematological:  Negative for adenopathy.     Objective:   There were no vitals taken for this visit.    Physical Exam  Constitutional:       Appearance: Normal appearance. He is well-developed.   Neurological:      Mental Status: He is alert.   Psychiatric:         Attention and Perception: Attention normal.         Mood and Affect: Mood and affect normal.       Assessment:       ICD-10-CM ICD-9-CM   1. Sore throat  J02.9 462     Plan:   Sore throat  -     predniSONE (DELTASONE) 50 MG Tab; Take 1 tablet (50 mg total) by mouth once daily. for 5 days  Dispense: 5 tablet; Refill: 0  -     (Magic mouthwash) 1:1:1 diphenhydrAMINE(Benadryl) 12.5mg/5ml liq, aluminum & magnesium hydroxide-simethicone (Maalox), LIDOcaine viscous 2%; Swish and spit 5 mLs every 6 (six) hours as needed (for Sore Throat).  Dispense: 90 mL; Refill: 0    Based on history and review of symptoms, low overall Centor score   Likelihood of strep infection 1 - 2. 5%   Most likely Viral pharyngitis or allergic pharyngitis.    No antibiotics indicated.    Prednisone 50 mg daily for 5 days for allergic component  Magic mouthwash every 6 hours as needed to promote healing   Return to clinic as scheduled/as needed

## 2022-12-21 ENCOUNTER — CLINICAL SUPPORT (OUTPATIENT)
Dept: REHABILITATION | Facility: HOSPITAL | Age: 27
End: 2022-12-21
Payer: OTHER GOVERNMENT

## 2022-12-21 DIAGNOSIS — G89.29 CHRONIC NECK AND BACK PAIN: ICD-10-CM

## 2022-12-21 DIAGNOSIS — R68.89 DECREASED STRENGTH, ENDURANCE, AND MOBILITY: ICD-10-CM

## 2022-12-21 DIAGNOSIS — Z74.09 DECREASED STRENGTH, ENDURANCE, AND MOBILITY: ICD-10-CM

## 2022-12-21 DIAGNOSIS — M54.9 CHRONIC NECK AND BACK PAIN: ICD-10-CM

## 2022-12-21 DIAGNOSIS — R53.1 DECREASED STRENGTH, ENDURANCE, AND MOBILITY: ICD-10-CM

## 2022-12-21 DIAGNOSIS — M54.2 CHRONIC NECK AND BACK PAIN: ICD-10-CM

## 2022-12-21 PROCEDURE — 97140 MANUAL THERAPY 1/> REGIONS: CPT

## 2022-12-21 PROCEDURE — 97161 PT EVAL LOW COMPLEX 20 MIN: CPT

## 2022-12-27 ENCOUNTER — CLINICAL SUPPORT (OUTPATIENT)
Dept: REHABILITATION | Facility: HOSPITAL | Age: 27
End: 2022-12-27
Payer: OTHER GOVERNMENT

## 2022-12-27 DIAGNOSIS — M54.2 CHRONIC NECK AND BACK PAIN: Primary | ICD-10-CM

## 2022-12-27 DIAGNOSIS — Z74.09 DECREASED STRENGTH, ENDURANCE, AND MOBILITY: ICD-10-CM

## 2022-12-27 DIAGNOSIS — R68.89 DECREASED STRENGTH, ENDURANCE, AND MOBILITY: ICD-10-CM

## 2022-12-27 DIAGNOSIS — M54.9 CHRONIC NECK AND BACK PAIN: Primary | ICD-10-CM

## 2022-12-27 DIAGNOSIS — G89.29 CHRONIC NECK AND BACK PAIN: Primary | ICD-10-CM

## 2022-12-27 DIAGNOSIS — R53.1 DECREASED STRENGTH, ENDURANCE, AND MOBILITY: ICD-10-CM

## 2022-12-27 PROCEDURE — 97140 MANUAL THERAPY 1/> REGIONS: CPT

## 2022-12-27 PROCEDURE — 97112 NEUROMUSCULAR REEDUCATION: CPT

## 2022-12-27 PROCEDURE — 97110 THERAPEUTIC EXERCISES: CPT

## 2022-12-28 PROBLEM — R53.1 DECREASED STRENGTH, ENDURANCE, AND MOBILITY: Status: ACTIVE | Noted: 2022-12-28

## 2022-12-28 PROBLEM — Z74.09 DECREASED STRENGTH, ENDURANCE, AND MOBILITY: Status: ACTIVE | Noted: 2022-12-28

## 2022-12-28 PROBLEM — M54.9 CHRONIC NECK AND BACK PAIN: Status: ACTIVE | Noted: 2022-12-28

## 2022-12-28 PROBLEM — R68.89 DECREASED STRENGTH, ENDURANCE, AND MOBILITY: Status: ACTIVE | Noted: 2022-12-28

## 2022-12-28 PROBLEM — G89.29 CHRONIC NECK AND BACK PAIN: Status: ACTIVE | Noted: 2022-12-28

## 2022-12-28 PROBLEM — M54.2 CHRONIC NECK AND BACK PAIN: Status: ACTIVE | Noted: 2022-12-28

## 2022-12-28 NOTE — PLAN OF CARE
[facility]  Physical Therapy Initial Evaluation    Name: Cyril Carlton  Clinic Number: [id number]    Therapy Diagnosis:   Encounter Diagnoses   Name Primary?    Chronic neck and back pain     Decreased strength, endurance, and mobility       Physician: Lon Thomas MD    Physician Orders: PT Eval and Treat  Medical Diagnosis from Referral: Neck Pain   Evaluation Date: 12/21/2022  Authorization Period Expiration: 12/31/22  Plan of Care Expiration: 02/21/2023    Progress Update: 01/21/2023    FOTO: 0 / 3    Visit # / Visits authorized: 1 / 1      PRECAUTIONS: Standard Precautions         Time In: 1415  Time Out: 1500  Total Appointment Time (timed & untimed codes): 40 minutes    SUBJECTIVE   Date of onset:     History of current condition - Cyril is a 27 y.o. male whom reports neck and mid back pain. Patient can not remember any one movement that could have cause the pain. Patient does believe it could be stress related  Cyril's current exercise regiment includes: lifting weights 3-5x weekly.      Imaging: No updated imaging for this episode of care   Prior Therapy: Yes  Social History: Pt lives alone  Living Environment: Apt  ADLs unable to complete: Dressing and driving   Gym/Home Equipment: yes   Occupation: Pt is    Prior Level of Function: Independent with all ADLs  Current Level of Function: 85% of PLOF      Pain:  Current 5 /10, worst 7 /10, best 2 /10   Location: upper trap region   Description: Aching, Throbbing, Grabbing, and Tight  Aggravating Factors: unknown  Easing Factors: rest and stretching       _______________________________________________________  Medical History:   Past Medical History:   Diagnosis Date    Patient denies medical problems        Surgical History:   Cyril Carlton  has a past surgical history that includes No past surgeries.    Medications:   Cyril has a current medication list which includes the following prescription(s): diphenhydramine hcl,  cholecalciferol (vitamin d3), hydroxyzine pamoate, tizanidine, and venlafaxine.    Allergies:   Review of patient's allergies indicates:  No Known Allergies     OBJECTIVE     RANGE OF MOTION:    Cervical Spine/ Right  12/21/2022 Left    12/21/2022 Pain/Dysfunction with Movement Goal   Cervical Flexion (60) 75%  PAIN 100%  Initial:    Cervical Extension (90) 75%   100%  Initial:    Cervical Side Bending (45) 75% 75% PAIN  100%  Initial:    Cervical Rotation (75) 75% 75%  100%  Initial:      Lumbar AROM/PROM Spine  Right  12/21/2022 Left    12/21/2022 Pain/Dysfunction with Movement Goal   Lumbar Flexion (60) 100%   100%  Initial:    Lumbar Extension (30) 75%   100%  Initial:    Lumbar Side Bending (25) 75% 75%  100%  Initial:    Lumbar Rotation 100% 100%  Pain Free  Initial:      Hip AROM/PROM Right  12/21/2022 Left  12/21/2022 Pain/Dysfunction with Movement Goal   Hip IR (45) 10 15  40  Initial:    Hip ER (45) 30 30  40  Initial:      NEURO SCREEN:    Neuro Testing Right  12/21/2022 Left  12/21/2022 Details   Reflexes  X X    Dermatomes WNL WNL    Myotome Deficits WNL WNL    Tone X X    Spasticity X X        FUNCTIONAL SCREEN:    Upper Extremity ROM Details STRENGTH Details   Shoulder WNL  discomfort Grossly 4+/5    Elbow WNL No Pain  Grossly 5/5    Hand/Wrist WNL No pain  Grossly 5/5      Lower Extremity STRENGTH Details   Hip Grossly 5/5    Knee Grossly 5/5    Foot/Ankle Grossly 5/5      Abdominal Strength STRENGTH Details   Pelvic Tilt X    Double Leg Drop X    Plank X        JOINT MOBILITY:     Joint Motion Tested Right  (spine)  12/21/2022 Left   12/21/2022 Goal   Cervical Mobility: C1-2 Normal Normal Normal B   Cervical Moiblity: C3-7 Hypomobile Hypomobile Normal B   Thoracic Mobility: T1-12 Hypomobile Hypomobile Normal B   Lumbar Mobility: L1-5 Normal Normal Normal B   Sacral Mobility Normal Normal Normal B     Sensation:  Sensation is intact to light touch    Palpation: Increased tone and tenderness noted  with palpation to: UPPER RIGHT TRAP REGION     Posture:  Pt presents with postural abnormalities which include:   Upper Quarter (s): ROUNDED SHOULDERS   Trunk:   Lower Quarter (s):     Gait Analysis: The patient ambulated with the following assistive device: none; the pt presents with the following gait abnormalities: decreased step length, jessica, and arm swing;     Movement Analysis:   Functional Test  Outcome   Double Leg Squat X   Single Leg Step Down X     FUNCTION:     CMS Impairment/Limitation/Restriction for FOTO NECK Survey    Therapist reviewed FOTO scores for Cyril Carlton on 12/21/2022.   FOTO documents entered into [employer] - see Media section.    Limitation Score: TBA%         TREATMENT     Total Treatment time separate from Evaluation: (20) minutes    Cyril received therapeutic exercises to develop strength, endurance, ROM, flexibility, and posture for (10) minutes including: x = exercises performed     TherEx 12/21/2022    Upright Bike/ UBE next                     Plan for Next Visit: Address upper trap tightness     Home Exercises and Patient Education Provided    Education/Self-Care provided: (included in treatment) minutes   Patient educated on the impairments noted above and the effects of physical therapy intervention to improve overall condition and QOL.   Patient was educated on all the above exercise prior/during/after for proper posture, positioning, and execution for safe performance with home exercise program.  Exercise Prescription:   12/21/2022: EVAL- Low     Written Home Exercises Provided: yes. Prefers: Electronic Copies  Exercises were reviewed and Cyril was able to demonstrate them prior to the end of the session.  Cyirl demonstrated good understanding of the education provided.     See EMR under Patient Instructions for exercises provided during therapy sessions.    ASSESSMENT     Cyril is a 27 y.o. male referred to outpatient Physical Therapy with a medical diagnosis of upper trap  "kenia Nam presents with clinical signs and symptoms that support this diagnosis with decreased Cervical ROM, decreased upper extremity strength, Cervical joint(s) hypomobility, upper extremity neural tension, and impaired functional mobility.     The above impairments will be addressed through manual therapy techniques, therapeutic exercises, functional training, and modalities as necessary. Patient was treated and educated on exercises for home program, progression of therapy, and benefits of therapy to achieve full functional mobility.     Pt prognosis is Excellent.   Pt will benefit from skilled outpatient Physical Therapy to address the deficits stated above and in the chart below, provide pt/family education, and to maximize pt's level of independence.     Plan of care discussed with patient: Yes  Pt's spiritual, cultural and educational needs considered and patient is agreeable to the plan of care and goals as stated below:     Anticipated Barriers for therapy: occupation    Medical Necessity is demonstrated by the following  History  Co-morbidities and personal factors that may impact the plan of care Co-morbidities:   difficulty sleeping    Personal Factors:   no deficits     low   Examination  Body Structures and Functions, activity limitations and participation restrictions that may impact the plan of care Body Regions:   neck  back    Body Systems:    gross symmetry  ROM  strength  gross coordinated movement  transfers  motor control    Participation Restrictions:   See above in "Current Level of Function"     Activity limitations:   Learning and applying knowledge  no deficits    General Tasks and Commands  no deficits    Communication  no deficits    Mobility  lifting and carrying objects    Self care  dressing  looking after one's health    Domestic Life  doing house work (cleaning house, washing dishes, laundry)  assisting others    Interactions/Relationships  no deficits    Life Areas  no " deficits    Community and Social Life  community life  recreation and leisure         low   Clinical Presentation stable and uncomplicated low   Decision Making/ Complexity Score: low       GOALS:  SHORT TERM GOALS: 4 weeks, (01/21/23) 12/21/2022   Recent signs and systems trend is improving in order to progress towards LTG's.    Patient will be independent with HEP in order to further progress and return to maximal function.    Pain rating at Worst: 5/10 in order to progress towards increased independence with activity.    Patient will be able to correct postural deviations in sitting and standing, to decrease pain and promote postural awareness for injury prevention.       LONG TERM GOALS: 8 weeks, (02/21/23) 12/21/2022   Patient will return to normal ADL, recreational, and work related activities with less pain and limitation.     Patient will improve AROM to stated goals in order to return to maximal functional potential.     Patient will improve Strength to stated goals of appropriate musculature in order to improve functional independence.     Pain Rating at Best: 1/10 to improve Quality of Life.     Patient will meet predicted functional outcome (FOTO) score: TBA% to increase self-worth & perceived functional ability.    Patient will have met/partially met personal goal of: being able to 20lbs overhead without pain         PLAN   Plan of care Certification: 12/21/2022 to 02/21/2023    Outpatient Physical Therapy 2 times weekly for 8 weeks to include any combination of the following interventions: virtual visits, dry needling, modalities, electrical stimulation (IFC, Pre-Mod, Attended with Functional Dry Needling), Gait Training, Manual Therapy, Moist Heat/ Ice, Neuromuscular Re-ed, Patient Education, Self Care, Therapeutic Exercise, Functional Training, and Therapeutic Activites     Thank you for this referral.    These services are reasonable and necessary for the conditions set forth above while under my  care.    Shayne Brody, PT, DPT

## 2022-12-29 NOTE — PROGRESS NOTES
[facility]  Physical Therapy Treatment Note     Name: Cyril Carlton  [facility] Number: [id number]    Therapy Diagnosis:   Encounter Diagnoses   Name Primary?    Chronic neck and back pain Yes    Decreased strength, endurance, and mobility      Physician: Lon Thomas MD  Visit Date: 12/27/2022  Physician Orders: PT Eval and Treat  Medical Diagnosis from Referral: Neck Pain   Evaluation Date: 12/21/2022  Authorization Period Expiration: 12/31/22  Plan of Care Expiration: 02/21/2023                          Progress Update: 01/21/2023                        FOTO: 0 / 3    Visit # / Visits authorized: 1 / 15 (+1 eval)                          PRECAUTIONS: Standard Precautions      Time In: 1330  Time Out: 1415  Total Billable Time: 40 minutes    SUBJECTIVE     Pt reports:  pain with overhead movemements   Compliance with Hep: Daily  Response to previous treatment: no adverse reactions to treatment/updated HEP  Functional change: No Change    Pain: 4/10   Worst: 7/10  Location: bilateral upper trap/ neck region   Pain Control: rest and stretching   Aggravating factors: overhead movements       OBJECTIVE     Objective Measures updated at progress report unless specified otherwise.      Treatment     Gym/Equipment Access: increase secondary to verbal cues   Time to Complete Exercises: Increase secondary to verbal/tactile cues     Cyril received therapeutic exercises to develop strength, endurance, ROM, flexibility, posture, and core stabilization for 10 minutes including:  UBE     Standing Rows                                       Cyril received the following manual therapy techniques: Joint mobilizations, Manual traction, Myofacial release, Manual Lymphatic Drainage, Soft tissue Mobilization, and Friction Massage were applied to the: bilateral upper traps for 25 minutes, including:  Soft tissue x Upper traps/ scalenes/ supraspinatus    Joint Mobs x Cervical side bend    FDN x Upper traps                                  Cyril participated in neuromuscular re-education activities to improve: Balance, Coordination, Kinesthetic, Proprioception, and Posture for 10 minutes. The following activities were included:  Wall [place]      Upper trap stretch  x    Chin Tucks  x    Open Books                          Home Exercises and Patient Education Provided     Education/Self-Care provided: (included in treatment) minutes   Patient educated on the impairments noted above and the effects of physical therapy intervention to improve overall condition and QOL.   Patient was educated on all the above exercise prior/during/after for proper posture, positioning, and execution for safe performance with home exercise program.  Exercise Prescription:   12/21/2022: EVAL- Low      Written Home Exercises Provided: yes. Prefers: Electronic Copies  Exercises were reviewed and Cyril was able to demonstrate them prior to the end of the session.  Cyril demonstrated good understanding of the education provided  See EMR under Patient Instructions for exercises provided during therapy sessions.    ASSESSMENT     Cyril Carlton tolerated PT session well with minimal  complaints of pain or discomfort, secondary to poor motor control and decrease muscle endurance. Objective findings are improving with measurements of ROM and functional mobility.  Therapy exercises were reviewed by revisiting exercises given from previous home exercise program while adding more dynamic stability exercises.  Handouts were not issued during today's visit. Cyril demonstrated good understanding of new exercises and will continue to progress at home until next follow-up.       Cyril Is progressing well towards his goals.   Pt prognosis is Excellent.     Pt will continue to benefit from skilled outpatient physical therapy to address the deficits listed in the problem list box on initial evaluation, provide pt/family education and to maximize pt's level of  independence in the home and community environment.     Pt's spiritual, cultural and educational needs considered and pt agreeable to plan of care and goals.    Anticipated barriers to physical therapy: occupation    GOALS:  SHORT TERM GOALS: 4 weeks, (01/21/23) 12/21/2022   Recent signs and systems trend is improving in order to progress towards LTG's.     Patient will be independent with HEP in order to further progress and return to maximal function.     Pain rating at Worst: 5/10 in order to progress towards increased independence with activity.     Patient will be able to correct postural deviations in sitting and standing, to decrease pain and promote postural awareness for injury prevention.         LONG TERM GOALS: 8 weeks, (02/21/23) 12/21/2022   Patient will return to normal ADL, recreational, and work related activities with less pain and limitation.      Patient will improve AROM to stated goals in order to return to maximal functional potential.      Patient will improve Strength to stated goals of appropriate musculature in order to improve functional independence.      Pain Rating at Best: 1/10 to improve Quality of Life.      Patient will meet predicted functional outcome (FOTO) score: TBA% to increase self-worth & perceived functional ability.     Patient will have met/partially met personal goal of: being able to 20lbs overhead without pain         PC = progressing/continue  PM= partially met  DC= discontinue    PLAN     Continue Plan of Care (POC) and progress per patient tolerance. See Treatment section for exercise progression.    Shayne Brody, PT, DPT

## 2023-01-04 ENCOUNTER — CLINICAL SUPPORT (OUTPATIENT)
Dept: REHABILITATION | Facility: HOSPITAL | Age: 28
End: 2023-01-04
Payer: OTHER GOVERNMENT

## 2023-01-04 DIAGNOSIS — Z74.09 DECREASED STRENGTH, ENDURANCE, AND MOBILITY: ICD-10-CM

## 2023-01-04 DIAGNOSIS — M54.2 CHRONIC NECK AND BACK PAIN: Primary | ICD-10-CM

## 2023-01-04 DIAGNOSIS — G89.29 CHRONIC NECK AND BACK PAIN: Primary | ICD-10-CM

## 2023-01-04 DIAGNOSIS — R53.1 DECREASED STRENGTH, ENDURANCE, AND MOBILITY: ICD-10-CM

## 2023-01-04 DIAGNOSIS — M54.9 CHRONIC NECK AND BACK PAIN: Primary | ICD-10-CM

## 2023-01-04 DIAGNOSIS — R68.89 DECREASED STRENGTH, ENDURANCE, AND MOBILITY: ICD-10-CM

## 2023-01-04 PROCEDURE — 97112 NEUROMUSCULAR REEDUCATION: CPT

## 2023-01-04 PROCEDURE — 97140 MANUAL THERAPY 1/> REGIONS: CPT

## 2023-01-04 NOTE — PROGRESS NOTES
[facility]  Physical Therapy Treatment Note     Name: Cyril Carlton  [facility] Number: [id number]    Therapy Diagnosis:   Encounter Diagnoses   Name Primary?    Chronic neck and back pain Yes    Decreased strength, endurance, and mobility      Physician: Lon Thomas MD  Visit Date: 1/4/2023  Physician Orders: PT Eval and Treat  Medical Diagnosis from Referral: Neck Pain   Evaluation Date: 12/21/2022  Authorization Period Expiration: 12/31/22  Plan of Care Expiration: 02/21/2023                          Progress Update: 01/21/2023                        FOTO: 0 / 3    Visit # / Visits authorized: 1 / 13 (+2 visits)                          PRECAUTIONS: Standard Precautions      Time In: 1330  Time Out: 1415  Total Billable Time: 40 minutes    SUBJECTIVE     Pt reports:  pain with overhead movemements   Compliance with Hep: Daily  Response to previous treatment: no adverse reactions to treatment/updated HEP  Functional change: No Change    Pain: 4/10   Worst: 7/10  Location: bilateral upper trap/ neck region   Pain Control: rest and stretching   Aggravating factors: overhead movements       OBJECTIVE     Objective Measures updated at progress report unless specified otherwise.      Treatment     Gym/Equipment Access: increase secondary to verbal cues   Time to Complete Exercises: Increase secondary to verbal/tactile cues     Cyril received therapeutic exercises to develop strength, endurance, ROM, flexibility, posture, and core stabilization for 5 minutes including:  UBE/ Bike     Standing Rows                                       Cyril received the following manual therapy techniques: Joint mobilizations, Manual traction, Myofacial release, Manual Lymphatic Drainage, Soft tissue Mobilization, and Friction Massage were applied to the: bilateral upper traps for 25 minutes, including:  Soft tissue x Upper traps/ scalenes/ supraspinatus    Joint Mobs x Cervical side bend    FDN x Upper  kiesha Nam participated in neuromuscular re-education activities to improve: Balance, Coordination, Kinesthetic, Proprioception, and Posture for 10 minutes. The following activities were included:  Wall [place]      Upper trap stretch  x    Chin Tucks  x    Open Books                          Home Exercises and Patient Education Provided     Education/Self-Care provided: (included in treatment) minutes   Patient educated on the impairments noted above and the effects of physical therapy intervention to improve overall condition and QOL.   Patient was educated on all the above exercise prior/during/after for proper posture, positioning, and execution for safe performance with home exercise program.  Exercise Prescription:   12/21/2022: EVAL- Low      Written Home Exercises Provided: yes. Prefers: Electronic Copies  Exercises were reviewed and Cyril was able to demonstrate them prior to the end of the session.  Cyril demonstrated good understanding of the education provided  See EMR under Patient Instructions for exercises provided during therapy sessions.    ASSESSMENT     Cyril Carlton tolerated PT session well with minimal complaints of pain or discomfort, secondary to poor motor control and decrease muscle endurance. Objective findings are improving with measurements of ROM and functional mobility.  Therapy exercises were reviewed by revisiting exercises given from previous home exercise program while adding more dynamic stability exercises.  Handouts were not issued during today's visit. Cyril demonstrated good understanding of new exercises and will continue to progress at home until next follow-up.       Cyril Is progressing well towards his goals.   Pt prognosis is Excellent.     Pt will continue to benefit from skilled outpatient physical therapy to address the deficits listed in the problem list box on initial evaluation, provide pt/family education and to maximize pt's level of  independence in the home and community environment.     Pt's spiritual, cultural and educational needs considered and pt agreeable to plan of care and goals.    Anticipated barriers to physical therapy: occupation    GOALS:  SHORT TERM GOALS: 4 weeks, (01/21/23) 12/21/2022   Recent signs and systems trend is improving in order to progress towards LTG's.     Patient will be independent with HEP in order to further progress and return to maximal function.     Pain rating at Worst: 5/10 in order to progress towards increased independence with activity.     Patient will be able to correct postural deviations in sitting and standing, to decrease pain and promote postural awareness for injury prevention.         LONG TERM GOALS: 8 weeks, (02/21/23) 12/21/2022   Patient will return to normal ADL, recreational, and work related activities with less pain and limitation.      Patient will improve AROM to stated goals in order to return to maximal functional potential.      Patient will improve Strength to stated goals of appropriate musculature in order to improve functional independence.      Pain Rating at Best: 1/10 to improve Quality of Life.      Patient will meet predicted functional outcome (FOTO) score: TBA% to increase self-worth & perceived functional ability.     Patient will have met/partially met personal goal of: being able to 20lbs overhead without pain         PC = progressing/continue  PM= partially met  DC= discontinue    PLAN     Continue Plan of Care (POC) and progress per patient tolerance. See Treatment section for exercise progression.    Shayne Brody, PT, DPT

## 2023-01-10 ENCOUNTER — OFFICE VISIT (OUTPATIENT)
Dept: PSYCHIATRY | Facility: CLINIC | Age: 28
End: 2023-01-10
Payer: OTHER GOVERNMENT

## 2023-01-10 DIAGNOSIS — R41.840 DIFFICULTY CONCENTRATING: ICD-10-CM

## 2023-01-10 DIAGNOSIS — Z65.8 RELATIONAL PROBLEM: ICD-10-CM

## 2023-01-10 DIAGNOSIS — F43.29 STRESS AND ADJUSTMENT REACTION: Primary | ICD-10-CM

## 2023-01-10 PROCEDURE — 90834 PR PSYCHOTHERAPY W/PATIENT, 45 MIN: ICD-10-PCS | Mod: 95,,, | Performed by: SOCIAL WORKER

## 2023-01-10 PROCEDURE — 90834 PSYTX W PT 45 MINUTES: CPT | Mod: 95,,, | Performed by: SOCIAL WORKER

## 2023-01-11 ENCOUNTER — CLINICAL SUPPORT (OUTPATIENT)
Dept: REHABILITATION | Facility: HOSPITAL | Age: 28
End: 2023-01-11
Payer: OTHER GOVERNMENT

## 2023-01-11 DIAGNOSIS — R53.1 DECREASED STRENGTH, ENDURANCE, AND MOBILITY: ICD-10-CM

## 2023-01-11 DIAGNOSIS — Z74.09 DECREASED STRENGTH, ENDURANCE, AND MOBILITY: ICD-10-CM

## 2023-01-11 DIAGNOSIS — G89.29 CHRONIC NECK AND BACK PAIN: Primary | ICD-10-CM

## 2023-01-11 DIAGNOSIS — R68.89 DECREASED STRENGTH, ENDURANCE, AND MOBILITY: ICD-10-CM

## 2023-01-11 DIAGNOSIS — M54.2 CHRONIC NECK AND BACK PAIN: Primary | ICD-10-CM

## 2023-01-11 DIAGNOSIS — M54.9 CHRONIC NECK AND BACK PAIN: Primary | ICD-10-CM

## 2023-01-11 PROCEDURE — 97140 MANUAL THERAPY 1/> REGIONS: CPT

## 2023-01-11 PROCEDURE — 97112 NEUROMUSCULAR REEDUCATION: CPT

## 2023-01-11 NOTE — PROGRESS NOTES
[facility]  Physical Therapy Treatment Note     Name: Cyril Carlton  [facility] Number: [id number]    Therapy Diagnosis:   Encounter Diagnoses   Name Primary?    Chronic neck and back pain Yes    Decreased strength, endurance, and mobility      Physician: Lon Thomas MD  Visit Date: 1/11/2023  Physician Orders: PT Eval and Treat  Medical Diagnosis from Referral: Neck Pain   Evaluation Date: 12/21/2022  Authorization Period Expiration: 12/31/22  Plan of Care Expiration: 02/21/2023                          Progress Update: 01/21/2023                        FOTO: 0 / 3    Visit # / Visits authorized: 2 / 13 (+2 visits)                          PRECAUTIONS: Standard Precautions      Time In: 1415  Time Out: 1500  Total Billable Time: 40 minutes    SUBJECTIVE     Pt reports:  pain with overhead movemements   Compliance with Hep: Daily  Response to previous treatment: no adverse reactions to treatment/updated HEP  Functional change: No Change    Pain: 4/10   Worst: 7/10  Location: bilateral upper trap/ neck region   Pain Control: rest and stretching   Aggravating factors: overhead movements     OBJECTIVE     Objective Measures updated at progress report unless specified otherwise.    Treatment     Gym/Equipment Access: increase secondary to verbal cues   Time to Complete Exercises: Increase secondary to verbal/tactile cues     Cyril received therapeutic exercises to develop strength, endurance, ROM, flexibility, posture, and core stabilization for 5 minutes including:  UBE/ Bike x    Standing Rows                                       Cyril received the following manual therapy techniques: Joint mobilizations, Manual traction, Myofacial release, Manual Lymphatic Drainage, Soft tissue Mobilization, and Friction Massage were applied to the: bilateral upper traps for 25 minutes, including:  Soft tissue x Upper traps/ scalenes/ supraspinatus    Joint Mobs x Cervical side bend    FDN x Upper  kiesha Nam participated in neuromuscular re-education activities to improve: Balance, Coordination, Kinesthetic, Proprioception, and Posture for 10 minutes. The following activities were included:  Wall [place]      Upper trap stretch  x    Chin Tucks  x    Open Books  x                        Home Exercises and Patient Education Provided     Education/Self-Care provided: (included in treatment) minutes   Patient educated on the impairments noted above and the effects of physical therapy intervention to improve overall condition and QOL.   Patient was educated on all the above exercise prior/during/after for proper posture, positioning, and execution for safe performance with home exercise program.  Exercise Prescription:   12/21/2022: EVAL- Low      Written Home Exercises Provided: yes. Prefers: Electronic Copies  Exercises were reviewed and Cyril was able to demonstrate them prior to the end of the session.  Cyril demonstrated good understanding of the education provided  See EMR under Patient Instructions for exercises provided during therapy sessions.    ASSESSMENT     Cyril Carlton tolerated PT session well with minimal complaints of pain or discomfort, secondary to poor motor control and decrease muscle endurance. Objective findings are improving with measurements of ROM and functional mobility.  Therapy exercises were reviewed by revisiting exercises given from previous home exercise program while adding more dynamic stability exercises.  Handouts were not issued during today's visit. Cyril demonstrated good understanding of new exercises and will continue to progress at home until next follow-up.       Cyril Is progressing well towards his goals.   Pt prognosis is Excellent.     Pt will continue to benefit from skilled outpatient physical therapy to address the deficits listed in the problem list box on initial evaluation, provide pt/family education and to maximize pt's level  of independence in the home and community environment.     Pt's spiritual, cultural and educational needs considered and pt agreeable to plan of care and goals.    Anticipated barriers to physical therapy: occupation    GOALS:  SHORT TERM GOALS: 4 weeks, (01/21/23) 12/21/2022   Recent signs and systems trend is improving in order to progress towards LTG's.     Patient will be independent with HEP in order to further progress and return to maximal function.     Pain rating at Worst: 5/10 in order to progress towards increased independence with activity.     Patient will be able to correct postural deviations in sitting and standing, to decrease pain and promote postural awareness for injury prevention.         LONG TERM GOALS: 8 weeks, (02/21/23) 12/21/2022   Patient will return to normal ADL, recreational, and work related activities with less pain and limitation.      Patient will improve AROM to stated goals in order to return to maximal functional potential.      Patient will improve Strength to stated goals of appropriate musculature in order to improve functional independence.      Pain Rating at Best: 1/10 to improve Quality of Life.      Patient will meet predicted functional outcome (FOTO) score: TBA% to increase self-worth & perceived functional ability.     Patient will have met/partially met personal goal of: being able to 20lbs overhead without pain         PC = progressing/continue  PM= partially met  DC= discontinue    PLAN     Continue Plan of Care (POC) and progress per patient tolerance. See Treatment section for exercise progression.    Shayne Brody, PT, DPT

## 2023-01-18 ENCOUNTER — CLINICAL SUPPORT (OUTPATIENT)
Dept: REHABILITATION | Facility: HOSPITAL | Age: 28
End: 2023-01-18
Payer: OTHER GOVERNMENT

## 2023-01-18 DIAGNOSIS — M54.2 CHRONIC NECK AND BACK PAIN: Primary | ICD-10-CM

## 2023-01-18 DIAGNOSIS — M54.9 CHRONIC NECK AND BACK PAIN: Primary | ICD-10-CM

## 2023-01-18 DIAGNOSIS — R68.89 DECREASED STRENGTH, ENDURANCE, AND MOBILITY: ICD-10-CM

## 2023-01-18 DIAGNOSIS — Z74.09 DECREASED STRENGTH, ENDURANCE, AND MOBILITY: ICD-10-CM

## 2023-01-18 DIAGNOSIS — G89.29 CHRONIC NECK AND BACK PAIN: Primary | ICD-10-CM

## 2023-01-18 DIAGNOSIS — R53.1 DECREASED STRENGTH, ENDURANCE, AND MOBILITY: ICD-10-CM

## 2023-01-18 PROCEDURE — 97140 MANUAL THERAPY 1/> REGIONS: CPT

## 2023-01-18 PROCEDURE — 97112 NEUROMUSCULAR REEDUCATION: CPT

## 2023-01-19 NOTE — PROGRESS NOTES
[facility]  Physical Therapy Treatment Note     Name: Cyril Carlton  [facility] Number: [id number]    Therapy Diagnosis:   Encounter Diagnoses   Name Primary?    Chronic neck and back pain Yes    Decreased strength, endurance, and mobility      Physician: Lon Thomas MD  Visit Date: 1/18/2023  Physician Orders: PT Eval and Treat  Medical Diagnosis from Referral: Neck Pain   Evaluation Date: 12/21/2022  Authorization Period Expiration: 12/31/22  Plan of Care Expiration: 02/21/2023                          Progress Update: 01/21/2023                        FOTO: 0 / 3    Visit # / Visits authorized: 3 / 13 (+2 visits)                          PRECAUTIONS: Standard Precautions      Time In: 1500  Time Out: 1545  Total Billable Time: 40 minutes    SUBJECTIVE     Pt reports:  pain with overhead movemements   Compliance with Hep: Daily  Response to previous treatment: no adverse reactions to treatment/updated HEP  Functional change: No Change    Pain: 4/10   Worst: 7/10  Location: bilateral upper trap/ neck region   Pain Control: rest and stretching   Aggravating factors: overhead movements     OBJECTIVE     Objective Measures updated at progress report unless specified otherwise.    Treatment     Gym/Equipment Access: increase secondary to verbal cues   Time to Complete Exercises: Increase secondary to verbal/tactile cues     Cyril received therapeutic exercises to develop strength, endurance, ROM, flexibility, posture, and core stabilization for 5 minutes including:  UBE/ Bike x    Standing Rows                                       Cyril received the following manual therapy techniques: Joint mobilizations, Manual traction, Myofacial release, Manual Lymphatic Drainage, Soft tissue Mobilization, and Friction Massage were applied to the: bilateral upper traps for 25 minutes, including:  Soft tissue x Upper traps/ scalenes/ supraspinatus    Joint Mobs x Cervical side bend    FDN x Upper  kiesha Nam participated in neuromuscular re-education activities to improve: Balance, Coordination, Kinesthetic, Proprioception, and Posture for 10 minutes. The following activities were included:  Wall [place]      Upper trap stretch  x    Chin Tucks  x    Open Books  x                        Home Exercises and Patient Education Provided     Education/Self-Care provided: (included in treatment) minutes   Patient educated on the impairments noted above and the effects of physical therapy intervention to improve overall condition and QOL.   Patient was educated on all the above exercise prior/during/after for proper posture, positioning, and execution for safe performance with home exercise program.  Exercise Prescription:   12/21/2022: EVAL- Low      Written Home Exercises Provided: yes. Prefers: Electronic Copies  Exercises were reviewed and Cyril was able to demonstrate them prior to the end of the session.  Cyril demonstrated good understanding of the education provided  See EMR under Patient Instructions for exercises provided during therapy sessions.    ASSESSMENT     Cyril Carlton tolerated PT session well with minimal complaints of pain or discomfort, secondary to poor motor control and decrease muscle endurance. Objective findings are improving with measurements of ROM and functional mobility.  Therapy exercises were reviewed by revisiting exercises given from previous home exercise program while adding more dynamic stability exercises.  Handouts were not issued during today's visit. Cyril demonstrated good understanding of new exercises and will continue to progress at home until next follow-up.       Cyril Is progressing well towards his goals.   Pt prognosis is Excellent.     Pt will continue to benefit from skilled outpatient physical therapy to address the deficits listed in the problem list box on initial evaluation, provide pt/family education and to maximize pt's level  of independence in the home and community environment.     Pt's spiritual, cultural and educational needs considered and pt agreeable to plan of care and goals.    Anticipated barriers to physical therapy: occupation    GOALS:  SHORT TERM GOALS: 4 weeks, (01/21/23) 12/21/2022   Recent signs and systems trend is improving in order to progress towards LTG's.     Patient will be independent with HEP in order to further progress and return to maximal function.     Pain rating at Worst: 5/10 in order to progress towards increased independence with activity.     Patient will be able to correct postural deviations in sitting and standing, to decrease pain and promote postural awareness for injury prevention.         LONG TERM GOALS: 8 weeks, (02/21/23) 12/21/2022   Patient will return to normal ADL, recreational, and work related activities with less pain and limitation.      Patient will improve AROM to stated goals in order to return to maximal functional potential.      Patient will improve Strength to stated goals of appropriate musculature in order to improve functional independence.      Pain Rating at Best: 1/10 to improve Quality of Life.      Patient will meet predicted functional outcome (FOTO) score: TBA% to increase self-worth & perceived functional ability.     Patient will have met/partially met personal goal of: being able to 20lbs overhead without pain         PC = progressing/continue  PM= partially met  DC= discontinue    PLAN     Continue Plan of Care (POC) and progress per patient tolerance. See Treatment section for exercise progression.    Shayne Brody, PT, DPT

## 2023-01-23 NOTE — PROGRESS NOTES
Individual Psychotherapy Follow-up Visit Progress Note (PhD/LCSW)     Patient's stated location at the time of visit: PSY Televisit Pt Location: home/residence in the [facility]    Each patient provided medical services by telemedicine is: (1) informed of the relationship between the provider and patient and the respective role of any other health care provider with respect to management of the patient; and (2) notified that he or she may decline to receive medical services by telemedicine and may withdraw from such care at any time.    Crisis Disclaimer: Patient was informed that due to the virtual nature of the visit, that if a crisis develops, protocols will be implemented to ensure patient safety, including but not limited to: (1) Initiating a welfare check with local Law Enforcement, (2) Calling 1/National Crisis Hotline, and/or (3) Initiating PEC/CEC procedures. Virtual visit with synchronous audio and video.    Outpatient - Insight oriented psychotherapy 45 min - CPT code 41303    Date: 12:30 AM      1/10/2023  MRN: [id number]  Primary Care Provider: Lon Thomas MD    Cyril Carlton is a 27 y.o. male who presents today for follow-up of depression and anxiety. Met with patient.        Subjective:       Patient report:  Met with this patient for his online follow-up appointment.  The patient was in his home throughout the appointment.  The patient stated that since his last appointment he has continued to read self-help books about relationships and has continued to study his relationship in a very purposeful way making Hower points of all of his discoveries.  He has, however, continued to obsess about the girlfriend who broke up with him.  He has several times sent her emails trying to reconcile with her until she finally told him that she was dating someone else.  This made him very angry and he sent 1 last very angry e-mail.  He read these emails loud and was given the suggestion that if he felt  the need to write more emails to her to keep them, share them in session and not send them to her right away.  Was honest with the patient that this could lead to a form of communication which could feel like stalking to this woman.  The patient reluctantly agreed and stated that he would try not to send her anymore emails.  Discussed the concept of loving someone in a very servant oriented manner so that you care more about the person's feelings then about your own feelings.  This left the patient was something to think about and to journal about and he stated he would make a follow-up appointment.     Current symptoms:   Depression: depressed mood, fatigue, worthlessness/guilt, and poor concentration  Anxiety: excessive anxiety/worry  Substance abuse: denied  Cognitive functioning: denied  [place]: none noted  Psychosis: none noted    PHQ-9 Depression Patient Health Questionnaire 1/10/2023 12/5/2022   Patient agreed to terms: Yes Yes   Little interest or pleasure in doing things 0 0   Feeling down, depressed, or hopeless 0 0   Trouble falling or staying asleep, or sleeping too much 0 1   Feeling tired or having little energy 0 0   Poor appetite or overeating 0 0   Feeling bad about yourself - or that you are a failure or have let yourself or your family down 0 0   Trouble concentrating on things, such as reading the newspaper or watching television 1 0   Moving or speaking so slowly that other people could have noticed. Or the opposite - being so fidgety or restless that you have been moving around a lot more than usual 0 0   Thoughts that you would be better off dead, or of hurting yourself in some way 0 0   PHQ-9 Total Score 1 1   If you checked off any problems, how difficult have these problems made it for you to do your work, take care of things at home, or get along with other people? Not difficult at all Not difficult at all   Interpretation Minimal or None Minimal or None       No flowsheet data  found.    Psychosocial stressors and topics discussed: identifying feelings      Objective:       Mental Status Evaluation  Appearance: unremarkable, age appropriate  Behavior: normal, cooperative  Speech: normal tone, normal rate, normal pitch, normal volume  Mood: anxious, depressed  Affect: congruent and appropriate  Thought Process: normal and logical  Thought Content: normal, no suicidality, no homicidality, delusions, or paranoia  Sensorium: grossly intact  Cognition: grossly intact  Insight: fair  Judgment: adequate to circumstances    Risk parameters:  Patient reports no suicidal ideation  Patient reports no homicidal ideation  Patient reports no self-injurious behavior  Patient reports no violent behavior      Assessment & Plan:       Therapeutic interventions used: Educated pt re: how anxious fears are maintained by a cycle of unwarranted fear and avoidance that precludes positive, corrective experiences with the feared object/situation  Discussed how treatment targets worry, anxiety symptoms, and avoidance to help pt manage worry effectively  Assigned pt to practice relaxation on a daily basis  Encouraged pt to share feelings of depression to clarify them and gain insight into their causes  Assessed pt's current and past mood episodes, including the features, frequency, intensity and duration   Consistent eye contact, active listening, unconditional positive regard and warm acceptance were used to help build trust  Asked pt to describe his/her feelings about himself/herself and how he/she sees himself/herself as compared with others     The patient's response to the interventions is reluctant    The patient's progress toward treatment goals is fair     Homework assigned: daily journaling and practice relaxation skills daily     Treatment plan:   A. Target symptoms: Depression, Anxiety, and Poor Coping Skills   B. Therapeutic modalities: insight oriented psychotherapy  C. Why chosen therapy is appropriate  versus another modality: patient responds to this modality   D. Outcome monitoring methods: self-report, observation     Visit Diagnosis:   1. Stress and adjustment reaction    2. Difficulty concentrating    3. Relational problem        Follow-up: individual psychotherapy    Return to Clinic: as scheduled    Length of Service (minutes): 45       Cherry Martinez LCSW  01/23/2023   12:30 AM

## 2023-01-24 ENCOUNTER — CLINICAL SUPPORT (OUTPATIENT)
Dept: REHABILITATION | Facility: HOSPITAL | Age: 28
End: 2023-01-24
Payer: OTHER GOVERNMENT

## 2023-01-24 DIAGNOSIS — M54.2 CHRONIC NECK AND BACK PAIN: Primary | ICD-10-CM

## 2023-01-24 DIAGNOSIS — Z74.09 DECREASED STRENGTH, ENDURANCE, AND MOBILITY: ICD-10-CM

## 2023-01-24 DIAGNOSIS — G89.29 CHRONIC NECK AND BACK PAIN: Primary | ICD-10-CM

## 2023-01-24 DIAGNOSIS — R53.1 DECREASED STRENGTH, ENDURANCE, AND MOBILITY: ICD-10-CM

## 2023-01-24 DIAGNOSIS — M54.9 CHRONIC NECK AND BACK PAIN: Primary | ICD-10-CM

## 2023-01-24 DIAGNOSIS — R68.89 DECREASED STRENGTH, ENDURANCE, AND MOBILITY: ICD-10-CM

## 2023-01-24 PROCEDURE — 97140 MANUAL THERAPY 1/> REGIONS: CPT

## 2023-01-24 PROCEDURE — 97112 NEUROMUSCULAR REEDUCATION: CPT

## 2023-01-25 NOTE — PROGRESS NOTES
[facility]  Physical Therapy Treatment Note     Name: Cyril Carlton  [facility] Number: [id number]    Therapy Diagnosis:   Encounter Diagnoses   Name Primary?    Chronic neck and back pain Yes    Decreased strength, endurance, and mobility      Physician: Lon Thomas MD  Visit Date: 1/24/2023  Physician Orders: PT Eval and Treat  Medical Diagnosis from Referral: Neck Pain   Evaluation Date: 12/21/2022  Authorization Period Expiration: 12/31/22  Plan of Care Expiration: 02/21/2023                          Progress Update: 01/21/2023                        FOTO: 0 / 3    Visit # / Visits authorized: 2 / 15 (+4 visits)                          PRECAUTIONS: Standard Precautions      Time In: 1515  Time Out: 1600  Total Billable Time: 40 minutes    SUBJECTIVE     Pt reports:  pain with overhead movemements   Compliance with Hep: Daily  Response to previous treatment: no adverse reactions to treatment/updated HEP  Functional change: No Change    Pain: 4/10   Worst: 7/10  Location: bilateral upper trap/ neck region   Pain Control: rest and stretching   Aggravating factors: overhead movements     OBJECTIVE     Objective Measures updated at progress report unless specified otherwise.    Treatment     Gym/Equipment Access: increase secondary to verbal cues   Time to Complete Exercises: Increase secondary to verbal/tactile cues     Cyril received therapeutic exercises to develop strength, endurance, ROM, flexibility, posture, and core stabilization for 5 minutes including:  UBE/ Bike x    Standing Rows                                       Cyril received the following manual therapy techniques: Joint mobilizations, Manual traction, Myofacial release, Manual Lymphatic Drainage, Soft tissue Mobilization, and Friction Massage were applied to the: bilateral upper traps for 25 minutes, including:  Soft tissue x Upper traps/ scalenes/ supraspinatus    Joint Mobs x Cervical side bend    FDN x Upper  traps/ right calf                                  Cyril participated in neuromuscular re-education activities to improve: Balance, Coordination, Kinesthetic, Proprioception, and Posture for 10 minutes. The following activities were included:  Wall [place]      Upper trap stretch  x    Chin Tucks  x    Open Books  x                        Home Exercises and Patient Education Provided     Education/Self-Care provided: (included in treatment) minutes   Patient educated on the impairments noted above and the effects of physical therapy intervention to improve overall condition and QOL.   Patient was educated on all the above exercise prior/during/after for proper posture, positioning, and execution for safe performance with home exercise program.  Exercise Prescription:   12/21/2022: EVAL- Low      Written Home Exercises Provided: yes. Prefers: Electronic Copies  Exercises were reviewed and Cyril was able to demonstrate them prior to the end of the session.  Cyril demonstrated good understanding of the education provided  See EMR under Patient Instructions for exercises provided during therapy sessions.    ASSESSMENT     Cyril Carlton tolerated PT session well with minimal complaints of pain or discomfort, secondary to poor motor control and decrease muscle endurance. Objective findings are improving with measurements of ROM and functional mobility.  Therapy exercises were reviewed by revisiting exercises given from previous home exercise program while adding more dynamic stability exercises.  Handouts were not issued during today's visit. Cyril demonstrated good understanding of new exercises and will continue to progress at home until next follow-up.       Cyril Is progressing well towards his goals.   Pt prognosis is Excellent.     Pt will continue to benefit from skilled outpatient physical therapy to address the deficits listed in the problem list box on initial evaluation, provide pt/family education and to maximize  pt's level of independence in the home and community environment.     Pt's spiritual, cultural and educational needs considered and pt agreeable to plan of care and goals.    Anticipated barriers to physical therapy: occupation    GOALS:  SHORT TERM GOALS: 4 weeks, (01/21/23) 12/21/2022   Recent signs and systems trend is improving in order to progress towards LTG's.     Patient will be independent with HEP in order to further progress and return to maximal function.     Pain rating at Worst: 5/10 in order to progress towards increased independence with activity.     Patient will be able to correct postural deviations in sitting and standing, to decrease pain and promote postural awareness for injury prevention.         LONG TERM GOALS: 8 weeks, (02/21/23) 12/21/2022   Patient will return to normal ADL, recreational, and work related activities with less pain and limitation.      Patient will improve AROM to stated goals in order to return to maximal functional potential.      Patient will improve Strength to stated goals of appropriate musculature in order to improve functional independence.      Pain Rating at Best: 1/10 to improve Quality of Life.      Patient will meet predicted functional outcome (FOTO) score: TBA% to increase self-worth & perceived functional ability.     Patient will have met/partially met personal goal of: being able to 20lbs overhead without pain         PC = progressing/continue  PM= partially met  DC= discontinue    PLAN     Continue Plan of Care (POC) and progress per patient tolerance. See Treatment section for exercise progression.    Shayne Brody, PT, DPT

## 2023-02-01 ENCOUNTER — CLINICAL SUPPORT (OUTPATIENT)
Dept: REHABILITATION | Facility: HOSPITAL | Age: 28
End: 2023-02-01
Payer: OTHER GOVERNMENT

## 2023-02-01 DIAGNOSIS — M54.9 CHRONIC NECK AND BACK PAIN: Primary | ICD-10-CM

## 2023-02-01 DIAGNOSIS — M54.2 CHRONIC NECK AND BACK PAIN: Primary | ICD-10-CM

## 2023-02-01 DIAGNOSIS — R68.89 DECREASED STRENGTH, ENDURANCE, AND MOBILITY: ICD-10-CM

## 2023-02-01 DIAGNOSIS — R53.1 DECREASED STRENGTH, ENDURANCE, AND MOBILITY: ICD-10-CM

## 2023-02-01 DIAGNOSIS — G89.29 CHRONIC NECK AND BACK PAIN: Primary | ICD-10-CM

## 2023-02-01 DIAGNOSIS — Z74.09 DECREASED STRENGTH, ENDURANCE, AND MOBILITY: ICD-10-CM

## 2023-02-01 PROCEDURE — 97112 NEUROMUSCULAR REEDUCATION: CPT

## 2023-02-01 PROCEDURE — 97140 MANUAL THERAPY 1/> REGIONS: CPT

## 2023-02-02 NOTE — PROGRESS NOTES
[facility]  Physical Therapy Treatment Note     Name: Cyril Carlton  [facility] Number: [id number]    Therapy Diagnosis:   Encounter Diagnoses   Name Primary?    Chronic neck and back pain Yes    Decreased strength, endurance, and mobility      Physician: Lon Thomas MD  Visit Date: 2/1/2023  Physician Orders: PT Eval and Treat  Medical Diagnosis from Referral: Neck Pain   Evaluation Date: 12/21/2022  Authorization Period Expiration: 12/31/22  Plan of Care Expiration: 02/21/2023                          Progress Update: 01/21/2023                        FOTO: 0 / 3    Visit # / Visits authorized: 2 / 15 (+4 visits)                          PRECAUTIONS: Standard Precautions      Time In: 1515  Time Out: 1600  Total Billable Time: 40 minutes    SUBJECTIVE     Pt reports:  pain with overhead movemements   Compliance with Hep: Daily  Response to previous treatment: no adverse reactions to treatment/updated HEP  Functional change: No Change    Pain: 4/10   Worst: 7/10  Location: bilateral upper trap/ neck region   Pain Control: rest and stretching   Aggravating factors: overhead movements     OBJECTIVE     Objective Measures updated at progress report unless specified otherwise.    Treatment     Gym/Equipment Access: increase secondary to verbal cues   Time to Complete Exercises: Increase secondary to verbal/tactile cues     Cyril received therapeutic exercises to develop strength, endurance, ROM, flexibility, posture, and core stabilization for 5 minutes including:  UBE/ Bike x 7 minutes    Standing Rows                                       Cyril received the following manual therapy techniques: Joint mobilizations, Manual traction, Myofacial release, Manual Lymphatic Drainage, Soft tissue Mobilization, and Friction Massage were applied to the: bilateral upper traps for 25 minutes, including:  Soft tissue x Upper traps/ scalenes/ supraspinatus    Joint Mobs x Cervical side bend    FDN x  Upper traps/ right calf                                  Cyril participated in neuromuscular re-education activities to improve: Balance, Coordination, Kinesthetic, Proprioception, and Posture for 10 minutes. The following activities were included:  Wall [place]      Upper trap stretch  x    Chin Tucks  x    Open Books  x                        Home Exercises and Patient Education Provided     Education/Self-Care provided: (included in treatment) minutes   Patient educated on the impairments noted above and the effects of physical therapy intervention to improve overall condition and QOL.   Patient was educated on all the above exercise prior/during/after for proper posture, positioning, and execution for safe performance with home exercise program.  Exercise Prescription:   12/21/2022: EVAL- Low      Written Home Exercises Provided: yes. Prefers: Electronic Copies  Exercises were reviewed and Cyril was able to demonstrate them prior to the end of the session.  Cyril demonstrated good understanding of the education provided  See EMR under Patient Instructions for exercises provided during therapy sessions.    ASSESSMENT     Cyril Carlton tolerated PT session well with minimal complaints of pain or discomfort, secondary to poor motor control and decrease muscle endurance. Objective findings are improving with measurements of ROM and functional mobility.  Therapy exercises were reviewed by revisiting exercises given from previous home exercise program while adding more dynamic stability exercises.  Handouts were not issued during today's visit. Cyril demonstrated good understanding of new exercises and will continue to progress at home until next follow-up.       Cyril Is progressing well towards his goals.   Pt prognosis is Excellent.     Pt will continue to benefit from skilled outpatient physical therapy to address the deficits listed in the problem list box on initial evaluation, provide pt/family education and to  maximize pt's level of independence in the home and community environment.     Pt's spiritual, cultural and educational needs considered and pt agreeable to plan of care and goals.    Anticipated barriers to physical therapy: occupation    GOALS:  SHORT TERM GOALS: 4 weeks, (01/21/23) 12/21/2022   Recent signs and systems trend is improving in order to progress towards LTG's.     Patient will be independent with HEP in order to further progress and return to maximal function.     Pain rating at Worst: 5/10 in order to progress towards increased independence with activity.     Patient will be able to correct postural deviations in sitting and standing, to decrease pain and promote postural awareness for injury prevention.         LONG TERM GOALS: 8 weeks, (02/21/23) 12/21/2022   Patient will return to normal ADL, recreational, and work related activities with less pain and limitation.      Patient will improve AROM to stated goals in order to return to maximal functional potential.      Patient will improve Strength to stated goals of appropriate musculature in order to improve functional independence.      Pain Rating at Best: 1/10 to improve Quality of Life.      Patient will meet predicted functional outcome (FOTO) score: TBA% to increase self-worth & perceived functional ability.     Patient will have met/partially met personal goal of: being able to 20lbs overhead without pain         PC = progressing/continue  PM= partially met  DC= discontinue    PLAN     Continue Plan of Care (POC) and progress per patient tolerance. See Treatment section for exercise progression.    Shayne Brody, PT, DPT

## 2023-02-08 ENCOUNTER — CLINICAL SUPPORT (OUTPATIENT)
Dept: REHABILITATION | Facility: HOSPITAL | Age: 28
End: 2023-02-08
Payer: OTHER GOVERNMENT

## 2023-02-08 DIAGNOSIS — R53.1 DECREASED STRENGTH, ENDURANCE, AND MOBILITY: ICD-10-CM

## 2023-02-08 DIAGNOSIS — Z74.09 DECREASED STRENGTH, ENDURANCE, AND MOBILITY: ICD-10-CM

## 2023-02-08 DIAGNOSIS — R68.89 DECREASED STRENGTH, ENDURANCE, AND MOBILITY: ICD-10-CM

## 2023-02-08 DIAGNOSIS — M54.2 CHRONIC NECK AND BACK PAIN: Primary | ICD-10-CM

## 2023-02-08 DIAGNOSIS — M54.9 CHRONIC NECK AND BACK PAIN: Primary | ICD-10-CM

## 2023-02-08 DIAGNOSIS — G89.29 CHRONIC NECK AND BACK PAIN: Primary | ICD-10-CM

## 2023-02-08 PROCEDURE — 97140 MANUAL THERAPY 1/> REGIONS: CPT

## 2023-02-08 PROCEDURE — 97110 THERAPEUTIC EXERCISES: CPT

## 2023-02-08 PROCEDURE — 97112 NEUROMUSCULAR REEDUCATION: CPT

## 2023-02-08 NOTE — PROGRESS NOTES
[facility]  Physical Therapy Treatment Note     Name: Cyril Carlton  [facility] Number: [id number]    Therapy Diagnosis:   Encounter Diagnoses   Name Primary?    Chronic neck and back pain Yes    Decreased strength, endurance, and mobility      Physician: Lon Thomas MD  Visit Date: 2/8/2023  Physician Orders: PT Eval and Treat  Medical Diagnosis from Referral: Neck Pain   Evaluation Date: 12/21/2022  Authorization Period Expiration: 12/31/22  Plan of Care Expiration: 02/21/2023                          Progress Update: 01/21/2023                        FOTO: 0 / 3    Visit # / Visits authorized: 4 / 15 (+4 visits)                          PRECAUTIONS: Standard Precautions      Time In: 1500  Time Out: 1545  Total Billable Time: 45 minutes    SUBJECTIVE     Pt reports:  pain with overhead movemements   Compliance with Hep: Daily  Response to previous treatment: no adverse reactions to treatment/updated HEP  Functional change: No Change    Pain: 4/10   Worst: 7/10  Location: bilateral upper trap/ neck region   Pain Control: rest and stretching   Aggravating factors: overhead movements     OBJECTIVE     Objective Measures updated at progress report unless specified otherwise.    Treatment     Gym/Equipment Access: increase secondary to verbal cues   Time to Complete Exercises: Increase secondary to verbal/tactile cues     Cyril received therapeutic exercises to develop strength, endurance, ROM, flexibility, posture, and core stabilization for 10 minutes including:  UBE/ Bike x 7 minutes    Standing Rows x 3x10 red band                                      Cyril received the following manual therapy techniques: Joint mobilizations, Manual traction, Myofacial release, Manual Lymphatic Drainage, Soft tissue Mobilization, and Friction Massage were applied to the: bilateral upper traps for 25 minutes, including:  Soft tissue x Upper traps/ scalenes/ supraspinatus    Joint Mobs x Cervical side  bend    FDN x Upper traps/ right calf                                  Cyril participated in neuromuscular re-education activities to improve: Balance, Coordination, Kinesthetic, Proprioception, and Posture for 10 minutes. The following activities were included:  Wall [place]      Upper trap stretch  x    Chin Tucks  x    Open Books                          Home Exercises and Patient Education Provided     Education/Self-Care provided: (included in treatment) minutes   Patient educated on the impairments noted above and the effects of physical therapy intervention to improve overall condition and QOL.   Patient was educated on all the above exercise prior/during/after for proper posture, positioning, and execution for safe performance with home exercise program.  Exercise Prescription:   12/21/2022: EVAL- Low      Written Home Exercises Provided: yes. Prefers: Electronic Copies  Exercises were reviewed and Cyril was able to demonstrate them prior to the end of the session.  Cyril demonstrated good understanding of the education provided  See EMR under Patient Instructions for exercises provided during therapy sessions.    ASSESSMENT     Cyril Carlton tolerated PT session well with minimal complaints of pain or discomfort, secondary to poor motor control and decrease muscle endurance. Objective findings are improving with measurements of ROM and functional mobility.  Therapy exercises were reviewed by revisiting exercises given from previous home exercise program while adding more dynamic stability exercises.  Handouts were not issued during today's visit. Cyril demonstrated good understanding of new exercises and will continue to progress at home until next follow-up.       Cyril Is progressing well towards his goals.   Pt prognosis is Excellent.     Pt will continue to benefit from skilled outpatient physical therapy to address the deficits listed in the problem list box on initial evaluation, provide pt/family  education and to maximize pt's level of independence in the home and community environment.     Pt's spiritual, cultural and educational needs considered and pt agreeable to plan of care and goals.    Anticipated barriers to physical therapy: occupation    GOALS:  SHORT TERM GOALS: 4 weeks, (01/21/23) 12/21/2022   Recent signs and systems trend is improving in order to progress towards LTG's.     Patient will be independent with HEP in order to further progress and return to maximal function.     Pain rating at Worst: 5/10 in order to progress towards increased independence with activity.     Patient will be able to correct postural deviations in sitting and standing, to decrease pain and promote postural awareness for injury prevention.         LONG TERM GOALS: 8 weeks, (02/21/23) 12/21/2022   Patient will return to normal ADL, recreational, and work related activities with less pain and limitation.      Patient will improve AROM to stated goals in order to return to maximal functional potential.      Patient will improve Strength to stated goals of appropriate musculature in order to improve functional independence.      Pain Rating at Best: 1/10 to improve Quality of Life.      Patient will meet predicted functional outcome (FOTO) score: TBA% to increase self-worth & perceived functional ability.     Patient will have met/partially met personal goal of: being able to 20lbs overhead without pain         PC = progressing/continue  PM= partially met  DC= discontinue    PLAN     Continue Plan of Care (POC) and progress per patient tolerance. See Treatment section for exercise progression.    Shayne Brody, PT, DPT

## 2023-02-15 ENCOUNTER — CLINICAL SUPPORT (OUTPATIENT)
Dept: REHABILITATION | Facility: HOSPITAL | Age: 28
End: 2023-02-15
Payer: OTHER GOVERNMENT

## 2023-02-15 DIAGNOSIS — M54.9 CHRONIC NECK AND BACK PAIN: Primary | ICD-10-CM

## 2023-02-15 DIAGNOSIS — G89.29 CHRONIC NECK AND BACK PAIN: Primary | ICD-10-CM

## 2023-02-15 DIAGNOSIS — M54.2 CHRONIC NECK AND BACK PAIN: Primary | ICD-10-CM

## 2023-02-15 DIAGNOSIS — R68.89 DECREASED STRENGTH, ENDURANCE, AND MOBILITY: ICD-10-CM

## 2023-02-15 DIAGNOSIS — R53.1 DECREASED STRENGTH, ENDURANCE, AND MOBILITY: ICD-10-CM

## 2023-02-15 DIAGNOSIS — Z74.09 DECREASED STRENGTH, ENDURANCE, AND MOBILITY: ICD-10-CM

## 2023-02-15 PROCEDURE — 97140 MANUAL THERAPY 1/> REGIONS: CPT

## 2023-02-15 PROCEDURE — 97112 NEUROMUSCULAR REEDUCATION: CPT

## 2023-02-15 PROCEDURE — 97110 THERAPEUTIC EXERCISES: CPT

## 2023-02-16 NOTE — PROGRESS NOTES
[facility]  Physical Therapy Treatment Note     Name: Cyril Carlton  [facility] Number: [id number]    Therapy Diagnosis:   Encounter Diagnoses   Name Primary?    Chronic neck and back pain Yes    Decreased strength, endurance, and mobility      Physician: Lon Thomas MD  Visit Date: 2/15/2023  Physician Orders: PT Eval and Treat  Medical Diagnosis from Referral: Neck Pain   Evaluation Date: 12/21/2022  Authorization Period Expiration: 12/31/22  Plan of Care Expiration: 02/21/2023                          Progress Update: 01/21/2023                        FOTO: 0 / 3    Visit # / Visits authorized: 5 / 15 (+4 visits)                          PRECAUTIONS: Standard Precautions      Time In: 1500  Time Out: 1545  Total Billable Time: 45 minutes    SUBJECTIVE     Pt reports:  pain with overhead movemements   Compliance with Hep: Daily  Response to previous treatment: no adverse reactions to treatment/updated HEP  Functional change: No Change    Pain: 4/10   Worst: 7/10  Location: bilateral upper trap/ neck region   Pain Control: rest and stretching   Aggravating factors: overhead movements     OBJECTIVE     Objective Measures updated at progress report unless specified otherwise.    Treatment     Gym/Equipment Access: increase secondary to verbal cues   Time to Complete Exercises: Increase secondary to verbal/tactile cues     Cyril received therapeutic exercises to develop strength, endurance, ROM, flexibility, posture, and core stabilization for 10 minutes including:  UBE/ Bike x 7 minutes    Standing Rows x 3x10 red band                                      Cyril received the following manual therapy techniques: Joint mobilizations, Manual traction, Myofacial release, Manual Lymphatic Drainage, Soft tissue Mobilization, and Friction Massage were applied to the: bilateral upper traps for 25 minutes, including:  Soft tissue x Upper traps/ scalenes/ supraspinatus    Joint Mobs x Cervical  side bend    FDN x Upper traps/ right calf                                  Cyril participated in neuromuscular re-education activities to improve: Balance, Coordination, Kinesthetic, Proprioception, and Posture for 10 minutes. The following activities were included:  Wall [place]      Upper trap stretch  x    Chin Tucks  x    Open Books                          Home Exercises and Patient Education Provided     Education/Self-Care provided: (included in treatment) minutes   Patient educated on the impairments noted above and the effects of physical therapy intervention to improve overall condition and QOL.   Patient was educated on all the above exercise prior/during/after for proper posture, positioning, and execution for safe performance with home exercise program.  Exercise Prescription:   12/21/2022: EVAL- Low      Written Home Exercises Provided: yes. Prefers: Electronic Copies  Exercises were reviewed and Cyril was able to demonstrate them prior to the end of the session.  Cyril demonstrated good understanding of the education provided  See EMR under Patient Instructions for exercises provided during therapy sessions.    ASSESSMENT     Cyril Carlton tolerated PT session well with minimal complaints of pain or discomfort, secondary to poor motor control and decrease muscle endurance. Objective findings are improving with measurements of ROM and functional mobility.  Therapy exercises were reviewed by revisiting exercises given from previous home exercise program while adding more dynamic stability exercises.  Handouts were not issued during today's visit. Cyril demonstrated good understanding of new exercises and will continue to progress at home until next follow-up.       Cyril Is progressing well towards his goals.   Pt prognosis is Excellent.     Pt will continue to benefit from skilled outpatient physical therapy to address the deficits listed in the problem list box on initial evaluation, provide pt/family  education and to maximize pt's level of independence in the home and community environment.     Pt's spiritual, cultural and educational needs considered and pt agreeable to plan of care and goals.    Anticipated barriers to physical therapy: occupation    GOALS:  SHORT TERM GOALS: 4 weeks, (01/21/23) 12/21/2022   Recent signs and systems trend is improving in order to progress towards LTG's.     Patient will be independent with HEP in order to further progress and return to maximal function.     Pain rating at Worst: 5/10 in order to progress towards increased independence with activity.     Patient will be able to correct postural deviations in sitting and standing, to decrease pain and promote postural awareness for injury prevention.         LONG TERM GOALS: 8 weeks, (02/21/23) 12/21/2022   Patient will return to normal ADL, recreational, and work related activities with less pain and limitation.      Patient will improve AROM to stated goals in order to return to maximal functional potential.      Patient will improve Strength to stated goals of appropriate musculature in order to improve functional independence.      Pain Rating at Best: 1/10 to improve Quality of Life.      Patient will meet predicted functional outcome (FOTO) score: TBA% to increase self-worth & perceived functional ability.     Patient will have met/partially met personal goal of: being able to 20lbs overhead without pain         PC = progressing/continue  PM= partially met  DC= discontinue    PLAN     Continue Plan of Care (POC) and progress per patient tolerance. See Treatment section for exercise progression.    Shayne Brody, PT, DPT

## 2023-02-28 ENCOUNTER — LAB VISIT (OUTPATIENT)
Dept: LAB | Facility: HOSPITAL | Age: 28
End: 2023-02-28
Payer: OTHER GOVERNMENT

## 2023-02-28 ENCOUNTER — OFFICE VISIT (OUTPATIENT)
Dept: INTERNAL MEDICINE | Facility: CLINIC | Age: 28
End: 2023-02-28
Payer: OTHER GOVERNMENT

## 2023-02-28 ENCOUNTER — LAB VISIT (OUTPATIENT)
Dept: LAB | Facility: HOSPITAL | Age: 28
End: 2023-02-28
Attending: FAMILY MEDICINE
Payer: OTHER GOVERNMENT

## 2023-02-28 VITALS
SYSTOLIC BLOOD PRESSURE: 118 MMHG | TEMPERATURE: 96 F | HEART RATE: 92 BPM | DIASTOLIC BLOOD PRESSURE: 82 MMHG | BODY MASS INDEX: 28.33 KG/M2 | OXYGEN SATURATION: 98 % | HEIGHT: 68 IN | WEIGHT: 186.94 LBS

## 2023-02-28 DIAGNOSIS — M54.9 CHRONIC NECK AND BACK PAIN: ICD-10-CM

## 2023-02-28 DIAGNOSIS — G89.29 CHRONIC NECK AND BACK PAIN: ICD-10-CM

## 2023-02-28 DIAGNOSIS — Z11.3 SCREENING FOR STD (SEXUALLY TRANSMITTED DISEASE): ICD-10-CM

## 2023-02-28 DIAGNOSIS — F43.29 STRESS AND ADJUSTMENT REACTION: Primary | ICD-10-CM

## 2023-02-28 DIAGNOSIS — M54.2 CHRONIC NECK AND BACK PAIN: ICD-10-CM

## 2023-02-28 DIAGNOSIS — M89.8X9 EXOSTOSIS: ICD-10-CM

## 2023-02-28 PROBLEM — Z74.09 DECREASED STRENGTH, ENDURANCE, AND MOBILITY: Status: RESOLVED | Noted: 2022-12-28 | Resolved: 2023-02-28

## 2023-02-28 PROBLEM — R53.1 DECREASED STRENGTH, ENDURANCE, AND MOBILITY: Status: RESOLVED | Noted: 2022-12-28 | Resolved: 2023-02-28

## 2023-02-28 PROBLEM — R68.89 DECREASED STRENGTH, ENDURANCE, AND MOBILITY: Status: RESOLVED | Noted: 2022-12-28 | Resolved: 2023-02-28

## 2023-02-28 LAB
BILIRUB UR QL STRIP: NEGATIVE
CLARITY UR: CLEAR
COLOR UR: YELLOW
GLUCOSE UR QL STRIP: NEGATIVE
HGB UR QL STRIP: NEGATIVE
KETONES UR QL STRIP: NEGATIVE
LEUKOCYTE ESTERASE UR QL STRIP: NEGATIVE
NITRITE UR QL STRIP: NEGATIVE
PH UR STRIP: 7 [PH] (ref 5–8)
PROT UR QL STRIP: NEGATIVE
SP GR UR STRIP: 1.01 (ref 1–1.03)
URN SPEC COLLECT METH UR: NORMAL

## 2023-02-28 PROCEDURE — 87389 HIV-1 AG W/HIV-1&-2 AB AG IA: CPT | Performed by: FAMILY MEDICINE

## 2023-02-28 PROCEDURE — 81003 URINALYSIS AUTO W/O SCOPE: CPT | Performed by: FAMILY MEDICINE

## 2023-02-28 PROCEDURE — 99214 OFFICE O/P EST MOD 30 MIN: CPT | Mod: S$PBB,,, | Performed by: FAMILY MEDICINE

## 2023-02-28 PROCEDURE — 86803 HEPATITIS C AB TEST: CPT | Performed by: FAMILY MEDICINE

## 2023-02-28 PROCEDURE — 87591 N.GONORRHOEAE DNA AMP PROB: CPT | Performed by: FAMILY MEDICINE

## 2023-02-28 PROCEDURE — 99999 PR PBB SHADOW E&M-EST. PATIENT-LVL IV: ICD-10-PCS | Mod: PBBFAC,,, | Performed by: FAMILY MEDICINE

## 2023-02-28 PROCEDURE — 99214 OFFICE O/P EST MOD 30 MIN: CPT | Mod: PBBFAC | Performed by: FAMILY MEDICINE

## 2023-02-28 PROCEDURE — 99214 PR OFFICE/OUTPT VISIT, EST, LEVL IV, 30-39 MIN: ICD-10-PCS | Mod: S$PBB,,, | Performed by: FAMILY MEDICINE

## 2023-02-28 PROCEDURE — 99999 PR PBB SHADOW E&M-EST. PATIENT-LVL IV: CPT | Mod: PBBFAC,,, | Performed by: FAMILY MEDICINE

## 2023-02-28 PROCEDURE — 36415 COLL VENOUS BLD VENIPUNCTURE: CPT | Performed by: FAMILY MEDICINE

## 2023-02-28 PROCEDURE — 86592 SYPHILIS TEST NON-TREP QUAL: CPT | Performed by: FAMILY MEDICINE

## 2023-02-28 RX ORDER — VENLAFAXINE HYDROCHLORIDE 150 MG/1
150 CAPSULE, EXTENDED RELEASE ORAL DAILY
Qty: 90 CAPSULE | Refills: 3 | Status: SHIPPED | OUTPATIENT
Start: 2023-02-28 | End: 2023-04-12

## 2023-02-28 NOTE — PROGRESS NOTES
Subjective:   Patient ID: Cyril Carlton is a 28 y.o. male.  Chief Complaint:  Follow-up    Presents for 3 month follow-up     - Stress and adjustment reaction  - Relational problem  - Difficulty concentrating  Last visit on Effexor XR 75 mg daily   Reported significant improvement in symptoms based on improved PHQ-9 PHQ 9 and BHARATH-7 scores   Tolerating medication without significant side effect   Recommended to continue Effexor XR 75 mg daily   Establish with counselor/therapist as planned   Today reports overall doing well  Feels that the therapy and other self help changes him significantly improved symptoms  Now wonders if the current dose of Effexor is doing much to help at all   Is interested in possibly increasing dose     - Chronic neck and back pain  Referred for physical therapy   Has completed treatment for his neck, back, and hip   Has had significant improvement but not back to previous baseline  Has been recommended by therapist to continue with additional sessions  Needs reorder of prove PT     -STD screening   Denies any known exposure   No active symptoms   No previous diagnosis or treatment  Hepatitis-C and HIV screens done as part of annual physical were negative   Would like full STI panel today.      Final complaint is bump/not on left side of forehead from where he had some trauma  It does not hurt  Maybe increasing in size  Just wants to make sure is not anything significant    Review of Systems   Constitutional:  Negative for activity change, appetite change, chills, diaphoresis, fatigue, fever and unexpected weight change.   HENT:  Negative for hearing loss, rhinorrhea and trouble swallowing.    Eyes:  Negative for discharge and visual disturbance.   Respiratory:  Negative for cough, chest tightness, shortness of breath and wheezing.    Cardiovascular:  Negative for chest pain, palpitations and leg swelling.   Gastrointestinal:  Negative for abdominal pain, blood in stool, constipation,  "diarrhea, nausea and vomiting.   Endocrine: Negative for polydipsia and polyuria.   Genitourinary:  Negative for difficulty urinating, dysuria, genital sores, hematuria, penile discharge, penile pain, testicular pain and urgency.   Musculoskeletal:  Positive for arthralgias, back pain and neck pain. Negative for joint swelling and myalgias.   Skin:  Negative for rash.   Neurological:  Negative for dizziness, weakness, light-headedness and headaches.   Psychiatric/Behavioral:  Positive for dysphoric mood. Negative for agitation, behavioral problems, confusion, decreased concentration, hallucinations, self-injury, sleep disturbance and suicidal ideas. The patient is nervous/anxious. The patient is not hyperactive.      Objective:   /82 (BP Location: Right arm, Patient Position: Sitting, BP Method: Large (Manual))   Pulse 92   Temp 96.4 °F (35.8 °C) (Tympanic)   Ht 5' 8" (1.727 m)   Wt 84.8 kg (186 lb 15.2 oz)   SpO2 98%   BMI 28.43 kg/m²     Physical Exam  Vitals and nursing note reviewed.   Constitutional:       Appearance: Normal appearance. He is well-developed and overweight.   HENT:      Head:     Cardiovascular:      Rate and Rhythm: Normal rate and regular rhythm.   Pulmonary:      Effort: Pulmonary effort is normal.   Musculoskeletal:      Right lower leg: No edema.      Left lower leg: No edema.   Skin:     Findings: No rash.   Neurological:      Coordination: Coordination is intact.      Gait: Gait is intact.   Psychiatric:         Attention and Perception: He is inattentive.         Mood and Affect: Affect normal. Mood is anxious and depressed.         Speech: Speech normal.         Behavior: Behavior normal. Behavior is cooperative.         Thought Content: Thought content normal.         Cognition and Memory: Cognition normal.         Judgment: Judgment normal.     Assessment:       ICD-10-CM ICD-9-CM   1. Stress and adjustment reaction  F43.29 309.89   2. Chronic neck and back pain  M54.2 " 723.1    M54.9 724.5    G89.29 338.29   3. Exostosis  M89.8X9 726.91   4. Screening for STD (sexually transmitted disease)  Z11.3 V74.5     Plan:   Stress and adjustment reaction  -     venlafaxine (EFFEXOR-XR) 150 MG Cp24; Take 1 capsule (150 mg total) by mouth once daily.  Dispense: 90 capsule; Refill: 3  Clinically 50% better  Recommend increase Effexor  mg daily  Continue counseling/therapy     Chronic neck and back pain  -     Ambulatory referral/consult to Physical/Occupational Therapy; Future; Expected date: 03/07/2023  Reordered/continue physical therapy for his neck, back, and hip     Exostosis  Patient education/handout on exostosis   No additional evaluation treatment needed     Screening for STD (sexually transmitted disease)  -     C. trachomatis/N. gonorrhoeae by AMP DNA; Future; Expected date: 02/28/2023  -     HIV 1/2 Ag/Ab (4th Gen); Future; Expected date: 02/28/2023  -     Hepatitis C Antibody; Future; Expected date: 02/28/2023  -     RPR; Future; Expected date: 02/28/2023  -     Urinalysis; Future; Expected date: 02/28/2023  Labs as above, treat as indicated.  Discussed safe sex practices to minimize risk of STD exposure.    Return to clinic 6 months for annual physical exam or sooner if needed

## 2023-03-01 ENCOUNTER — CLINICAL SUPPORT (OUTPATIENT)
Dept: REHABILITATION | Facility: HOSPITAL | Age: 28
End: 2023-03-01
Payer: OTHER GOVERNMENT

## 2023-03-01 DIAGNOSIS — G89.29 CHRONIC NECK AND BACK PAIN: ICD-10-CM

## 2023-03-01 DIAGNOSIS — M54.2 CHRONIC NECK AND BACK PAIN: ICD-10-CM

## 2023-03-01 DIAGNOSIS — R53.1 DECREASED RANGE OF MOTION WITH DECREASED STRENGTH: ICD-10-CM

## 2023-03-01 DIAGNOSIS — M54.9 CHRONIC NECK AND BACK PAIN: ICD-10-CM

## 2023-03-01 DIAGNOSIS — M25.60 DECREASED RANGE OF MOTION WITH DECREASED STRENGTH: ICD-10-CM

## 2023-03-01 LAB
HCV AB SERPL QL IA: NORMAL
HIV 1+2 AB+HIV1 P24 AG SERPL QL IA: NORMAL
RPR SER QL: NORMAL

## 2023-03-01 PROCEDURE — 97112 NEUROMUSCULAR REEDUCATION: CPT

## 2023-03-01 PROCEDURE — 97140 MANUAL THERAPY 1/> REGIONS: CPT

## 2023-03-01 PROCEDURE — 97110 THERAPEUTIC EXERCISES: CPT

## 2023-03-02 PROBLEM — R53.1 DECREASED RANGE OF MOTION WITH DECREASED STRENGTH: Status: ACTIVE | Noted: 2023-03-02

## 2023-03-02 PROBLEM — M25.60 DECREASED RANGE OF MOTION WITH DECREASED STRENGTH: Status: ACTIVE | Noted: 2023-03-02

## 2023-03-02 LAB
C TRACH DNA SPEC QL NAA+PROBE: NOT DETECTED
N GONORRHOEA DNA SPEC QL NAA+PROBE: NOT DETECTED

## 2023-03-02 NOTE — PROGRESS NOTES
[facility]  Physical Therapy Treatment Note     Name: Cyril Carlton  [facility] Number: [id number]    Therapy Diagnosis:   Encounter Diagnoses   Name Primary?    Chronic neck and back pain     Decreased range of motion with decreased strength      Physician: Lon Thomas MD  Visit Date: 3/1/2023  Physician Orders: PT Eval and Treat  Medical Diagnosis from Referral: Neck Pain   Evaluation Date: 12/21/2022  Authorization Period Expiration: 12/31/22  Plan of Care Expiration: 02/21/2023                          Progress Update: 01/21/2023                        FOTO: 0 / 3    Visit # / Visits authorized: 6 / 15 (+4 visits)                          PRECAUTIONS: Standard Precautions      Time In: 1515  Time Out: 1600  Total Billable Time: 45 minutes    SUBJECTIVE     Pt reports:  pain with overhead movemements   Compliance with Hep: Daily  Response to previous treatment: no adverse reactions to treatment/updated HEP  Functional change: No Change    Pain: 4/10   Worst: 7/10  Location: bilateral upper trap/ neck region   Pain Control: rest and stretching   Aggravating factors: overhead movements     OBJECTIVE     Objective Measures updated at progress report unless specified otherwise.    Treatment     Gym/Equipment Access: increase secondary to verbal cues   Time to Complete Exercises: Increase secondary to verbal/tactile cues     Cyril received therapeutic exercises to develop strength, endurance, ROM, flexibility, posture, and core stabilization for 10 minutes including:  UBE/ Bike x 7 minutes    Standing Rows x 3x10 red band                                      Cyril received the following manual therapy techniques: Joint mobilizations, Manual traction, Myofacial release, Manual Lymphatic Drainage, Soft tissue Mobilization, and Friction Massage were applied to the: bilateral upper traps for 25 minutes, including:  Soft tissue x Upper traps/ scalenes/ supraspinatus    Joint Mobs x Cervical  side bend    FDN x Upper traps/ right calf                                  Cyril participated in neuromuscular re-education activities to improve: Balance, Coordination, Kinesthetic, Proprioception, and Posture for 10 minutes. The following activities were included:  Wall [place]      Upper trap stretch  x    Chin Tucks  x    Open Books                          Home Exercises and Patient Education Provided     Education/Self-Care provided: (included in treatment) minutes   Patient educated on the impairments noted above and the effects of physical therapy intervention to improve overall condition and QOL.   Patient was educated on all the above exercise prior/during/after for proper posture, positioning, and execution for safe performance with home exercise program.  Exercise Prescription:   12/21/2022: EVAL- Low      Written Home Exercises Provided: yes. Prefers: Electronic Copies  Exercises were reviewed and Cyril was able to demonstrate them prior to the end of the session.  Cyril demonstrated good understanding of the education provided  See EMR under Patient Instructions for exercises provided during therapy sessions.    ASSESSMENT     Cyril Carlton tolerated PT session well with minimal complaints of pain or discomfort, secondary to poor motor control and decrease muscle endurance. Objective findings are improving with measurements of ROM and functional mobility. Therapy exercises were reviewed by revisiting exercises given from previous home exercise program while adding more dynamic stability exercises.  Handouts were not issued during today's visit. Cyril demonstrated good understanding of new exercises and will continue to progress at home until next follow-up.       Cyril Is progressing well towards his goals.   Pt prognosis is Excellent.     Pt will continue to benefit from skilled outpatient physical therapy to address the deficits listed in the problem list box on initial evaluation, provide pt/family  education and to maximize pt's level of independence in the home and community environment.     Pt's spiritual, cultural and educational needs considered and pt agreeable to plan of care and goals.    Anticipated barriers to physical therapy: occupation    GOALS:  SHORT TERM GOALS: 4 weeks, (01/21/23) 12/21/2022   Recent signs and systems trend is improving in order to progress towards LTG's.     Patient will be independent with HEP in order to further progress and return to maximal function.     Pain rating at Worst: 5/10 in order to progress towards increased independence with activity.     Patient will be able to correct postural deviations in sitting and standing, to decrease pain and promote postural awareness for injury prevention.         LONG TERM GOALS: 8 weeks, (02/21/23) 12/21/2022   Patient will return to normal ADL, recreational, and work related activities with less pain and limitation.      Patient will improve AROM to stated goals in order to return to maximal functional potential.      Patient will improve Strength to stated goals of appropriate musculature in order to improve functional independence.      Pain Rating at Best: 1/10 to improve Quality of Life.      Patient will meet predicted functional outcome (FOTO) score: TBA% to increase self-worth & perceived functional ability.     Patient will have met/partially met personal goal of: being able to 20lbs overhead without pain         PC = progressing/continue  PM= partially met  DC= discontinue    PLAN     Continue Plan of Care (POC) and progress per patient tolerance. See Treatment section for exercise progression.    Shayne Brody, PT, DPT

## 2023-03-08 ENCOUNTER — CLINICAL SUPPORT (OUTPATIENT)
Dept: REHABILITATION | Facility: HOSPITAL | Age: 28
End: 2023-03-08
Payer: OTHER GOVERNMENT

## 2023-03-08 DIAGNOSIS — M54.2 CHRONIC NECK AND BACK PAIN: Primary | ICD-10-CM

## 2023-03-08 DIAGNOSIS — G89.29 CHRONIC NECK AND BACK PAIN: Primary | ICD-10-CM

## 2023-03-08 DIAGNOSIS — M25.60 DECREASED RANGE OF MOTION WITH DECREASED STRENGTH: ICD-10-CM

## 2023-03-08 DIAGNOSIS — M54.9 CHRONIC NECK AND BACK PAIN: Primary | ICD-10-CM

## 2023-03-08 DIAGNOSIS — R53.1 DECREASED RANGE OF MOTION WITH DECREASED STRENGTH: ICD-10-CM

## 2023-03-08 PROCEDURE — 97140 MANUAL THERAPY 1/> REGIONS: CPT

## 2023-03-08 PROCEDURE — 97110 THERAPEUTIC EXERCISES: CPT

## 2023-03-08 PROCEDURE — 97112 NEUROMUSCULAR REEDUCATION: CPT

## 2023-03-08 NOTE — PROGRESS NOTES
[facility]  Physical Therapy Treatment Note     Name: Cyril Carlton  [facility] Number: [id number]    Therapy Diagnosis:   Encounter Diagnoses   Name Primary?    Chronic neck and back pain Yes    Decreased range of motion with decreased strength      Physician: Lon Thmoas MD  Visit Date: 3/8/2023  Physician Orders: PT Eval and Treat  Medical Diagnosis from Referral: Neck Pain   Evaluation Date: 12/21/2022  Authorization Period Expiration: 12/31/22  Plan of Care Expiration: 02/21/2023                          Progress Update: 01/21/2023                        FOTO: 0 / 3    Visit # / Visits authorized: 7 / 15 (+4 visits)                          PRECAUTIONS: Standard Precautions      Time In: 1515  Time Out: 1600  Total Billable Time: 40 minutes    SUBJECTIVE     Pt reports: pain with overhead movement like shooting a basketball.   Compliance with Hep: Daily  Response to previous treatment: no adverse reactions to treatment/updated HEP  Functional change: No Change    Pain: 3/10   Worst: 7/10  Location: bilateral upper trap/ neck region   Pain Control: rest and stretching   Aggravating factors: overhead movements     OBJECTIVE     Objective Measures updated at progress report unless specified otherwise.    Treatment     Gym/Equipment Access: increase secondary to verbal cues   Time to Complete Exercises: Increase secondary to verbal/tactile cues     Cyril received therapeutic exercises to develop strength, endurance, ROM, flexibility, posture, and core stabilization for 10 minutes including:  UBE/ Bike x 7 minutes    Standing Rows  3x10 red band    Prone Ws x 3x8                                Cyril received the following manual therapy techniques: Joint mobilizations, Manual traction, Myofacial release, Manual Lymphatic Drainage, Soft tissue Mobilization, and Friction Massage were applied to the: bilateral upper traps for 25 minutes, including:  Soft tissue x Upper traps/ scalenes/  supraspinatus    Joint Mobs x Cervical side bend    FDN x Upper traps/ right calf                                  Cyril participated in neuromuscular re-education activities to improve: Balance, Coordination, Kinesthetic, Proprioception, and Posture for 10 minutes. The following activities were included:  Wall [place]      Upper trap stretch  x    Chin Tucks  x    Open Books                          Home Exercises and Patient Education Provided     Education/Self-Care provided: (included in treatment) minutes   Patient educated on the impairments noted above and the effects of physical therapy intervention to improve overall condition and QOL.   Patient was educated on all the above exercise prior/during/after for proper posture, positioning, and execution for safe performance with home exercise program.  Exercise Prescription:   12/21/2022: EVAL- Low      Written Home Exercises Provided: yes. Prefers: Electronic Copies  Exercises were reviewed and Cyril was able to demonstrate them prior to the end of the session.  Cyril demonstrated good understanding of the education provided  See EMR under Patient Instructions for exercises provided during therapy sessions.    ASSESSMENT     Cyril Carlton tolerated PT session well with minimal complaints of pain or discomfort, secondary to poor motor control and decrease muscle endurance. Objective findings are improving with measurements of ROM and functional mobility. Therapy exercises were reviewed by revisiting exercises given from previous home exercise program while adding more dynamic stability exercises. Handouts were not issued during today's visit. Cyril demonstrated good understanding of new exercises and will continue to progress at home until next follow-up.       Cyril Is progressing well towards his goals.   Pt prognosis is Excellent.     Pt will continue to benefit from skilled outpatient physical therapy to address the deficits listed in the problem list box on  initial evaluation, provide pt/family education and to maximize pt's level of independence in the home and community environment.     Pt's spiritual, cultural and educational needs considered and pt agreeable to plan of care and goals.    Anticipated barriers to physical therapy: occupation    GOALS:  SHORT TERM GOALS: 4 weeks, (01/21/23) 12/21/2022   Recent signs and systems trend is improving in order to progress towards LTG's.  PC   Patient will be independent with HEP in order to further progress and return to maximal function.  PC    Pain rating at Worst: 5/10 in order to progress towards increased independence with activity.  PC    Patient will be able to correct postural deviations in sitting and standing, to decrease pain and promote postural awareness for injury prevention.   PC       LONG TERM GOALS: 8 weeks, (02/21/23) 12/21/2022   Patient will return to normal ADL, recreational, and work related activities with less pain and limitation.    PC   Patient will improve AROM to stated goals in order to return to maximal functional potential.    PC   Patient will improve Strength to stated goals of appropriate musculature in order to improve functional independence.   PC    Pain Rating at Best: 1/10 to improve Quality of Life.   PC    Patient will meet predicted functional outcome (FOTO) score: TBA% to increase self-worth & perceived functional ability.  PC    Patient will have met/partially met personal goal of: being able to 20lbs overhead without pain   PC       PC = progressing/continue  PM= partially met  DC= discontinue    PLAN     Continue Plan of Care (POC) and progress per patient tolerance. See Treatment section for exercise progression.    Shayne Brody, PT, DPT

## 2023-03-09 ENCOUNTER — OFFICE VISIT (OUTPATIENT)
Dept: PSYCHIATRY | Facility: CLINIC | Age: 28
End: 2023-03-09
Payer: OTHER GOVERNMENT

## 2023-03-09 DIAGNOSIS — Z65.8 RELATIONAL PROBLEM: ICD-10-CM

## 2023-03-09 DIAGNOSIS — R41.840 DIFFICULTY CONCENTRATING: ICD-10-CM

## 2023-03-09 DIAGNOSIS — F43.29 STRESS AND ADJUSTMENT REACTION: Primary | ICD-10-CM

## 2023-03-09 PROCEDURE — 90834 PR PSYCHOTHERAPY W/PATIENT, 45 MIN: ICD-10-PCS | Mod: 95,,, | Performed by: SOCIAL WORKER

## 2023-03-09 PROCEDURE — 90834 PSYTX W PT 45 MINUTES: CPT | Mod: 95,,, | Performed by: SOCIAL WORKER

## 2023-03-09 NOTE — PROGRESS NOTES
Individual Psychotherapy Follow-up Visit Progress Note (PhD/LCSW)     Due to the nature of this visit type, a virtual visit with synchronous audio and video, each patient to whom this provider administers behavioral health services by telemedicine is: (1) informed of the relationship between the provider and patient and the respective role of any other health care provider with respect to management of the patient; and (2) notified that he or she may decline to receive services by telemedicine and may withdraw from such care at any time.    The patient was informed of the following:     Provider's contact info:  [facility]  [id number] [place] [address], [address] Floor, [address]  [place] [address]  (Phone) [phone]    If technology issues occur, call office phone: : [phone]  If crisis: Dial 911 or go to nearest Emergency Room (ER)  If questions related to privacy practices: contact [facility] Information Department: [phone]    For security purposes, the pt identified that they were at [address]  [place] [address] during today's session and contact number is [phone].    The pt's emergency contact(s) is No emergency contact information on file..    Crisis Disclaimer: Patient was informed that due to the virtual nature of the visit, that if a crisis develops, protocols will be implemented to ensure patient safety, including but not limited to: 1) Initiating a welfare check with local law enforcement and/or 2) Calling 911.    Outpatient Psychotherapy - 45 minutes with patient (38-52 minutes) - [address]    Date: 03/26/2023    Visit Type: Telehealth        3/9/2023  MRN: [id number]  Primary Care Provider: Lon Thomas MD    Cyril Carlton is a 28 y.o. male who presents today for follow-up of depression, anxiety, and adjustment problems. Met with patient.      Preferred Name: Cyril   Transgender Identity Form    Gender Identity Form  Transition  "Summary         Subjective:     Last encounter (with this provider): 1/10/2023     Content of Current Session:  Met with this patient for his online follow-up appointment.  The patient was in his work office throughout the appointment.  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  He states that he is 13 more months to put in to his  career and then he plans to get out.  The patient continues to read helpful books about himself and his relationships with other people and how to live a good life.  He states that his favorite book is called "Think like a Monk" by GIBRAN Lewis.  He states that while he wants to love someone else and have a relationship he realizes that he needs to love himself in the process.  He is trying to see that a relationship can not complete him, because he has to complete himself.  He has agreed that he needs more social interactions with people and he and a friend of his have recently joined a kickball lead through [facility] in [place].  They have not started yet but he is looking forward to it.  He states that he is "restructuring his mindset."  The patient was giving positive affirmation for his insights into his need to understand himself and have personal love for himself.  Assisted the client in improving his problem-solving skills, helping him clearly defined a problem, brainstormed possible solutions, evaluate the pros and cons and implementing a plan.  The patient is hoping to use these suggestions for his work situation in which there is often some type of conflict.  Pointed out strengths and coping abilities that the patient already has and explored with the client how he is dealt with difficult situations in his past.  The patient stated that for the most part he is doing well but wants to make a follow-up appointment.    Therapeutic Interventions Utilized During Current Session: Acceptance " and Commitment Therapy, Emotionally Focused Therapy    No flowsheet data found.   0-4 = Minimal anxiety  5-9 = Mild anxiety  10-14 = Moderate anxiety  15-21 = Severe anxiety     PHQ-9 Depression Patient Health Questionnaire 3/21/2023 3/2/2023 1/10/2023 12/5/2022   Patient agreed to terms: Yes Yes Yes Yes   Little interest or pleasure in doing things 0 0 0 0   Feeling down, depressed, or hopeless 0 0 0 0   Trouble falling or staying asleep, or sleeping too much 0 0 0 1   Feeling tired or having little energy 0 0 0 0   Poor appetite or overeating 0 0 0 0   Feeling bad about yourself - or that you are a failure or have let yourself or your family down 0 0 0 0   Trouble concentrating on things, such as reading the newspaper or watching television 0 0 1 0   Moving or speaking so slowly that other people could have noticed. Or the opposite - being so fidgety or restless that you have been moving around a lot more than usual 0 0 0 0   Thoughts that you would be better off dead, or of hurting yourself in some way 0 0 0 0   PHQ-9 Total Score 0 0 1 1   If you checked off any problems, how difficult have these problems made it for you to do your work, take care of things at home, or get along with other people? Not difficult at all Not difficult at all Not difficult at all Not difficult at all   Interpretation Minimal or None Minimal or None Minimal or None Minimal or None     0-4 = No intervention  5 to 9 = Mild  10 to 14 = Moderate  15 to 19 = Moderately severe  ?20 = Severe      Objective:       Mental Status Evaluation  Appearance: unremarkable, age appropriate  Behavior: normal, cooperative  Speech: normal tone, normal rate, normal pitch, normal volume  Mood: steady  Affect: congruent and appropriate  Thought Process: normal and logical  Thought Content: normal, no suicidality, no homicidality, delusions, or paranoia  Sensorium: grossly intact  Cognition: grossly intact  Insight: fair  Judgment: adequate to  circumstances    Risk parameters:  Patient reports no suicidal ideation  Patient reports no homicidal ideation  Patient reports no self-injurious behavior  Patient reports no violent behavior      Assessment & Plan:     The patient's response to the interventions is accepting    The patient's progress toward treatment goals is fair     Homework assigned: daily journaling, practice relaxation skills daily, and implement sleep hygiene routine     Treatment plan:   A. Target symptoms: Depression, Anxiety, and Adjustment Disorder   B. Therapeutic modalities: insight oriented psychotherapy  C. Why chosen therapy is appropriate versus another modality: patient responds to this modality   D. Outcome monitoring methods: self report, observation, rating scales, feedback from clinical staff      Visit Diagnosis:   1. Stress and adjustment reaction    2. Difficulty concentrating    3. Relational problem        Follow-up: individual psychotherapy    Return to Clinic: as scheduled  Pt Reported to Schedule Self via Epic EMR MyChart Application and/or Department Support Staff

## 2023-03-15 ENCOUNTER — CLINICAL SUPPORT (OUTPATIENT)
Dept: REHABILITATION | Facility: HOSPITAL | Age: 28
End: 2023-03-15
Payer: OTHER GOVERNMENT

## 2023-03-15 DIAGNOSIS — M25.60 DECREASED RANGE OF MOTION WITH DECREASED STRENGTH: ICD-10-CM

## 2023-03-15 DIAGNOSIS — M54.2 CHRONIC NECK AND BACK PAIN: Primary | ICD-10-CM

## 2023-03-15 DIAGNOSIS — M54.9 CHRONIC NECK AND BACK PAIN: Primary | ICD-10-CM

## 2023-03-15 DIAGNOSIS — R53.1 DECREASED RANGE OF MOTION WITH DECREASED STRENGTH: ICD-10-CM

## 2023-03-15 DIAGNOSIS — G89.29 CHRONIC NECK AND BACK PAIN: Primary | ICD-10-CM

## 2023-03-15 PROCEDURE — 97112 NEUROMUSCULAR REEDUCATION: CPT

## 2023-03-15 PROCEDURE — 97140 MANUAL THERAPY 1/> REGIONS: CPT

## 2023-03-16 NOTE — PROGRESS NOTES
[facility]  Physical Therapy Treatment Note     Name: Cyril Carlton  [facility] Number: [id number]    Therapy Diagnosis:   Encounter Diagnoses   Name Primary?    Chronic neck and back pain Yes    Decreased range of motion with decreased strength      Physician: Lon Thomas MD  Visit Date: 3/15/2023  Physician Orders: PT Eval and Treat  Medical Diagnosis from Referral: Neck Pain   Evaluation Date: 12/21/2022  Authorization Period Expiration: 12/31/22  Plan of Care Expiration: 02/21/2023                          Progress Update: 01/21/2023                        FOTO: 0 / 3    Visit # / Visits authorized: 8 / 15 (+4 visits)                          PRECAUTIONS: Standard Precautions      Time In: 1515  Time Out: 1600  Total Billable Time: 40 minutes    SUBJECTIVE     Pt reports: pain with overhead movement like shooting a basketball.   Compliance with Hep: Daily  Response to previous treatment: no adverse reactions to treatment/updated HEP  Functional change: No Change    Pain: 3/10   Worst: 7/10  Location: bilateral upper trap/ neck region   Pain Control: rest and stretching   Aggravating factors: overhead movements     OBJECTIVE     Objective Measures updated at progress report unless specified otherwise.    Treatment     Gym/Equipment Access: increase secondary to verbal cues   Time to Complete Exercises: Increase secondary to verbal/tactile cues     Cyril received therapeutic exercises to develop strength, endurance, ROM, flexibility, posture, and core stabilization for 0 minutes including:  UBE/ Bike  7 minutes    Standing Rows  3x10 red band    Prone Ws  3x8                                Cyril received the following manual therapy techniques: Joint mobilizations, Manual traction, Myofacial release, Manual Lymphatic Drainage, Soft tissue Mobilization, and Friction Massage were applied to the: bilateral upper traps for 25 minutes, including:  Soft tissue x Upper traps/ scalenes/  supraspinatus    Joint Mobs x Cervical side bend    FDN x Upper traps/ right calf                                  Cyril participated in neuromuscular re-education activities to improve: Balance, Coordination, Kinesthetic, Proprioception, and Posture for 15 minutes. The following activities were included:  Wall Rafael Capo      Upper trap stretch  x    Chin Tucks  x    Open Books  x                        Home Exercises and Patient Education Provided     Education/Self-Care provided: (included in treatment) minutes   Patient educated on the impairments noted above and the effects of physical therapy intervention to improve overall condition and QOL.   Patient was educated on all the above exercise prior/during/after for proper posture, positioning, and execution for safe performance with home exercise program.  Exercise Prescription:   12/21/2022: EVAL- Low      Written Home Exercises Provided: yes. Prefers: Electronic Copies  Exercises were reviewed and Cyril was able to demonstrate them prior to the end of the session.  Cyril demonstrated good understanding of the education provided  See EMR under Patient Instructions for exercises provided during therapy sessions.    ASSESSMENT     Cyril Carlton tolerated PT session well with minimal complaints of pain or discomfort, secondary to poor motor control and decrease muscle endurance. Objective findings are improving with measurements of ROM and functional mobility. Therapy exercises were reviewed by revisiting exercises given from previous home exercise program while adding more dynamic stability exercises. Handouts were not issued during today's visit. Cyril demonstrated good understanding of new exercises and will continue to progress at home until next follow-up.       Cyril Is progressing well towards his goals.   Pt prognosis is Excellent.     Pt will continue to benefit from skilled outpatient physical therapy to address the deficits listed in the problem list box on  initial evaluation, provide pt/family education and to maximize pt's level of independence in the home and community environment.     Pt's spiritual, cultural and educational needs considered and pt agreeable to plan of care and goals.    Anticipated barriers to physical therapy: occupation    GOALS:  SHORT TERM GOALS: 4 weeks, (01/21/23) 12/21/2022   Recent signs and systems trend is improving in order to progress towards LTG's.  PC   Patient will be independent with HEP in order to further progress and return to maximal function.  PC    Pain rating at Worst: 5/10 in order to progress towards increased independence with activity.  PC    Patient will be able to correct postural deviations in sitting and standing, to decrease pain and promote postural awareness for injury prevention.   PC       LONG TERM GOALS: 8 weeks, (02/21/23) 12/21/2022   Patient will return to normal ADL, recreational, and work related activities with less pain and limitation.    PC   Patient will improve AROM to stated goals in order to return to maximal functional potential.    PC   Patient will improve Strength to stated goals of appropriate musculature in order to improve functional independence.   PC    Pain Rating at Best: 1/10 to improve Quality of Life.   PC    Patient will meet predicted functional outcome (FOTO) score: TBA% to increase self-worth & perceived functional ability.  PC    Patient will have met/partially met personal goal of: being able to 20lbs overhead without pain   PC       PC = progressing/continue  PM= partially met  DC= discontinue    PLAN     Continue Plan of Care (POC) and progress per patient tolerance. See Treatment section for exercise progression.    Shayne Brody, PT, DPT

## 2023-03-22 ENCOUNTER — CLINICAL SUPPORT (OUTPATIENT)
Dept: REHABILITATION | Facility: HOSPITAL | Age: 28
End: 2023-03-22
Payer: OTHER GOVERNMENT

## 2023-03-22 DIAGNOSIS — R53.1 DECREASED RANGE OF MOTION WITH DECREASED STRENGTH: ICD-10-CM

## 2023-03-22 DIAGNOSIS — M25.60 DECREASED RANGE OF MOTION WITH DECREASED STRENGTH: ICD-10-CM

## 2023-03-22 DIAGNOSIS — G89.29 CHRONIC NECK AND BACK PAIN: Primary | ICD-10-CM

## 2023-03-22 DIAGNOSIS — M54.9 CHRONIC NECK AND BACK PAIN: Primary | ICD-10-CM

## 2023-03-22 DIAGNOSIS — M54.2 CHRONIC NECK AND BACK PAIN: Primary | ICD-10-CM

## 2023-03-22 PROCEDURE — 97112 NEUROMUSCULAR REEDUCATION: CPT

## 2023-03-22 PROCEDURE — 97140 MANUAL THERAPY 1/> REGIONS: CPT

## 2023-03-23 NOTE — PROGRESS NOTES
[facility] OUTPATIENT THERAPY AND WELLNESS  Physical Therapy Treatment Note + Update Plan of Care      Name: Cyril Carlton  Clinic Number: [id number]    Therapy Diagnosis:   Encounter Diagnoses   Name Primary?    Chronic neck and back pain Yes    Decreased range of motion with decreased strength      Physician: Lon Thomas MD  Visit Date: 3/22/2023  Physician Orders: PT Eval and Treat  Medical Diagnosis from Referral: Neck Pain   Evaluation Date: 12/21/2022  Authorization Period Expiration: 12/31/22  Plan of Care Expiration: 04/21/2023                          Progress Update: 03/22/2023                        FOTO: 0 / 3    Visit # / Visits authorized: 9 / 15 (+4 visits)                          PRECAUTIONS: Standard Precautions      Time In: 1515  Time Out: 1600  Total Billable Time: 40 minutes    SUBJECTIVE     Pt reports: pain with overhead movement like shooting a basketball.   Compliance with Hep: Daily  Response to previous treatment: no adverse reactions to treatment/updated HEP  Functional change: No Change    Pain: 3/10   Worst: 7/10  Location: bilateral upper trap/ neck region   Pain Control: rest and stretching   Aggravating factors: overhead movements     OBJECTIVE     Objective Measures updated at progress report unless specified otherwise.     Measurements performed 03/22/23  RANGE OF MOTION:     Cervical Spine/ Right   Left      Pain/Dysfunction with Movement Goal   Cervical Flexion (60) 100%   PAIN 100%  Initial: 75%   Cervical Extension (90) 100%     100%  Initial:  75%   Cervical Side Bending (45) 75% 75% PAIN  100%  Initial:  (B) 75%   Cervical Rotation (75) 100% 75%   100%  Initial: (B) 75%        FUNCTIONAL SCREEN:     Upper Extremity ROM Details STRENGTH Details   Shoulder WNL  discomfort Grossly 4+/5     Elbow WNL No Pain  Grossly 5/5     Hand/Wrist WNL No pain  Grossly 5/5        Treatment     Gym/Equipment Access: increase secondary to verbal cues   Time to Complete Exercises: Increase  secondary to verbal/tactile cues     Cyril received therapeutic exercises to develop strength, endurance, ROM, flexibility, posture, and core stabilization for 0 minutes including:  UBE/ Bike  7 minutes    Standing Rows  3x10 red band    Prone Ws  3x8                                Cyril received the following manual therapy techniques: Joint mobilizations, Manual traction, Myofacial release, Manual Lymphatic Drainage, Soft tissue Mobilization, and Friction Massage were applied to the: bilateral upper traps for 25 minutes, including:  Soft tissue x Upper traps/ scalenes/ supraspinatus    Joint Mobs x Cervical side bend    FDN x Upper traps/ right calf                                  Cyril participated in neuromuscular re-education activities to improve: Balance, Coordination, Kinesthetic, Proprioception, and Posture for 15 minutes. The following activities were included:  Wall Milburn  x    Upper trap stretch  x    Chin Tucks  x    Open Books  x                        Home Exercises and Patient Education Provided     Education/Self-Care provided: (included in treatment) minutes   Patient educated on the impairments noted above and the effects of physical therapy intervention to improve overall condition and QOL.   Patient was educated on all the above exercise prior/during/after for proper posture, positioning, and execution for safe performance with home exercise program.  Exercise Prescription:   12/21/2022: EVAL- Low      Written Home Exercises Provided: yes. Prefers: Electronic Copies  Exercises were reviewed and Cyril was able to demonstrate them prior to the end of the session.  Cyril demonstrated good understanding of the education provided  See EMR under Patient Instructions for exercises provided during therapy sessions.    ASSESSMENT     Cyril Carlton tolerated PT session well with minimal complaints of pain or discomfort, secondary to poor motor control and decrease muscle endurance. Objective findings are  improving with measurements of ROM and functional mobility. Therapy exercises were reviewed by revisiting exercises given from previous home exercise program while adding more dynamic stability exercises. Handouts were not issued during today's visit. Cyril demonstrated good understanding of new exercises and will continue to progress at home until next follow-up.       Cyril Is progressing well towards his goals.   Pt prognosis is Excellent.     Pt will continue to benefit from skilled outpatient physical therapy to address the deficits listed in the problem list box on initial evaluation, provide pt/family education and to maximize pt's level of independence in the home and community environment.     Pt's spiritual, cultural and educational needs considered and pt agreeable to plan of care and goals.    Anticipated barriers to physical therapy: occupation    GOALS:  SHORT TERM GOALS: 4 weeks, (01/21/23) 03/22/23   Recent signs and systems trend is improving in order to progress towards LTG's.  PC   Patient will be independent with HEP in order to further progress and return to maximal function.  PC    Pain rating at Worst: 5/10 in order to progress towards increased independence with activity.  PC    Patient will be able to correct postural deviations in sitting and standing, to decrease pain and promote postural awareness for injury prevention.   PC       LONG TERM GOALS: 8  and continuing weeks, (02/21/23) 03/22/23   Patient will return to normal ADL, recreational, and work related activities with less pain and limitation.    PC   Patient will improve AROM to stated goals in order to return to maximal functional potential.    PC   Patient will improve Strength to stated goals of appropriate musculature in order to improve functional independence.   PC    Pain Rating at Best: 1/10 to improve Quality of Life.   PC    Patient will meet predicted functional outcome (FOTO) score: TBA% to increase self-worth &  perceived functional ability.  PC    Patient will have met/partially met personal goal of: being able to 20lbs overhead without pain   PC       PC = progressing/continue  PM= partially met  DC= discontinue    PLAN     Continue Plan of Care (POC) and progress per patient tolerance. See Treatment section for exercise progression.    Shayne Brody, PT, DPT

## 2023-03-23 NOTE — PLAN OF CARE
[facility]  Physical Therapy Plan of Care Note    Name: Cyril Carlton  [facility] Number: [id number]    Therapy Diagnosis:   Encounter Diagnoses   Name Primary?    Chronic neck and back pain Yes    Decreased range of motion with decreased strength      Physician: Lon Thomas MD  Visit Date: 3/22/2023  Physician Orders: PT Eval and Treat  Medical Diagnosis from Referral: Neck Pain   Evaluation Date: 12/21/2022  Authorization Period Expiration: 12/31/22  Plan of Care Expiration: 04/21/2023                          Progress Update: 03/22/2023                        FOTO: 0 / 3    Visit # / Visits authorized: 9 / 15 (+4 visits)                          PRECAUTIONS: Standard Precautions      Precautions: Standard  Functional Level Prior to Evaluation:  Independent     SUBJECTIVE     Update: See treatment note     OBJECTIVE     Update: See treatment note     ASSESSMENT     Update: See treatment note   Long Term Goal Status: continue per initial plan of care.  Reasons for Recertification of Therapy:   Scheduling conflicts      GOALS:  SHORT TERM GOALS: 4 weeks, (01/21/23) 03/22/23   Recent signs and systems trend is improving in order to progress towards LTG's.  PC   Patient will be independent with HEP in order to further progress and return to maximal function.  PC    Pain rating at Worst: 5/10 in order to progress towards increased independence with activity.  PC    Patient will be able to correct postural deviations in sitting and standing, to decrease pain and promote postural awareness for injury prevention.   PC       LONG TERM GOALS: 8  and continuing weeks, (02/21/23) 03/22/23   Patient will return to normal ADL, recreational, and work related activities with less pain and limitation.    PC   Patient will improve AROM to stated goals in order to return to maximal functional potential.    PC   Patient will improve Strength to stated goals of appropriate musculature in order to improve  functional independence.   PC    Pain Rating at Best: 1/10 to improve Quality of Life.   PC    Patient will meet predicted functional outcome (FOTO) score: TBA% to increase self-worth & perceived functional ability.  PC    Patient will have met/partially met personal goal of: being able to 20lbs overhead without pain   PC        PLAN     Updated Certification Period: 03/22/23 to 04/22/23   Recommended Treatment Plan: 2 times per week for 4 weeks:  Gait Training, Manual Therapy, Moist Heat/ Ice, Neuromuscular Re-ed, Patient Education, Self Care, Therapeutic Activities, and Therapeutic Exercise  Other Recommendations:     Shayne Brody, PT DPT    I CERTIFY THE NEED FOR THESE SERVICES FURNISHED UNDER THIS PLAN OF TREATMENT AND WHILE UNDER MY CARE  Physician's comments:      Physician's Signature: ___________________________________________________

## 2023-03-29 ENCOUNTER — CLINICAL SUPPORT (OUTPATIENT)
Dept: REHABILITATION | Facility: HOSPITAL | Age: 28
End: 2023-03-29
Payer: OTHER GOVERNMENT

## 2023-03-29 DIAGNOSIS — R53.1 DECREASED RANGE OF MOTION WITH DECREASED STRENGTH: ICD-10-CM

## 2023-03-29 DIAGNOSIS — M25.60 DECREASED RANGE OF MOTION WITH DECREASED STRENGTH: ICD-10-CM

## 2023-03-29 DIAGNOSIS — M54.9 CHRONIC NECK AND BACK PAIN: Primary | ICD-10-CM

## 2023-03-29 DIAGNOSIS — G89.29 CHRONIC NECK AND BACK PAIN: Primary | ICD-10-CM

## 2023-03-29 DIAGNOSIS — M54.2 CHRONIC NECK AND BACK PAIN: Primary | ICD-10-CM

## 2023-03-29 PROCEDURE — 97112 NEUROMUSCULAR REEDUCATION: CPT

## 2023-03-29 PROCEDURE — 97140 MANUAL THERAPY 1/> REGIONS: CPT

## 2023-03-30 NOTE — PROGRESS NOTES
[facility]AP[facility]  Physical Therapy Treatment Note + Update Plan of Care      Name: Cyril Carlton  Clinic Number: [id number]    Therapy Diagnosis:   Encounter Diagnoses   Name Primary?    Chronic neck and back pain Yes    Decreased range of motion with decreased strength      Physician: Lon Thomas MD  Visit Date: 3/29/2023  Physician Orders: PT Eval and Treat  Medical Diagnosis from Referral: Neck Pain   Evaluation Date: 12/21/2022  Authorization Period Expiration: 12/31/22  Plan of Care Expiration: 04/21/2023                          Progress Update: 03/22/2023                        FOTO: 0 / 3    Visit # / Visits authorized: 10 / 15 (+4 visits)                          PRECAUTIONS: Standard Precautions      Time In: 1515  Time Out: 1600  Total Billable Time: 40 minutes    SUBJECTIVE     Pt reports: pain with overhead movement like shooting a basketball.   Compliance with Hep: Daily  Response to previous treatment: no adverse reactions to treatment/updated HEP  Functional change: No Change    Pain: 3/10   Worst: 7/10  Location: bilateral upper trap/ neck region   Pain Control: rest and stretching   Aggravating factors: overhead movements     OBJECTIVE     Objective Measures updated at progress report unless specified otherwise.     Measurements performed 03/22/23  RANGE OF MOTION:     Cervical Spine/ Right   Left      Pain/Dysfunction with Movement Goal   Cervical Flexion (60) 100%   PAIN 100%  Initial: 75%   Cervical Extension (90) 100%     100%  Initial:  75%   Cervical Side Bending (45) 75% 75% PAIN  100%  Initial:  (B) 75%   Cervical Rotation (75) 100% 75%   100%  Initial: (B) 75%        FUNCTIONAL SCREEN:     Upper Extremity ROM Details STRENGTH Details   Shoulder WNL  discomfort Grossly 4+/5     Elbow WNL No Pain  Grossly 5/5     Hand/Wrist WNL No pain  Grossly 5/5        Treatment     Gym/Equipment Access: increase secondary to verbal cues   Time to Complete Exercises: Increase  secondary to verbal/tactile cues     Cyril received therapeutic exercises to develop strength, endurance, ROM, flexibility, posture, and core stabilization for 0 minutes including:  UBE/ Bike  7 minutes    Standing Rows  3x10 red band    Prone Ws  3x8                                Cyril received the following manual therapy techniques: Joint mobilizations, Manual traction, Myofacial release, Manual Lymphatic Drainage, Soft tissue Mobilization, and Friction Massage were applied to the: bilateral upper traps for 25 minutes, including:  Soft tissue x Upper traps/ scalenes/ supraspinatus    Joint Mobs x Cervical side bend    FDN x Upper traps/ right calf                                  Cyril participated in neuromuscular re-education activities to improve: Balance, Coordination, Kinesthetic, Proprioception, and Posture for 15 minutes. The following activities were included:  Wall Polo  x    Upper trap stretch  x    Chin Tucks  x    Open Books  x                        Home Exercises and Patient Education Provided     Education/Self-Care provided: (included in treatment) minutes   Patient educated on the impairments noted above and the effects of physical therapy intervention to improve overall condition and QOL.   Patient was educated on all the above exercise prior/during/after for proper posture, positioning, and execution for safe performance with home exercise program.  Exercise Prescription:   12/21/2022: EVAL- Low      Written Home Exercises Provided: yes. Prefers: Electronic Copies  Exercises were reviewed and Cyril was able to demonstrate them prior to the end of the session.  Cyril demonstrated good understanding of the education provided  See EMR under Patient Instructions for exercises provided during therapy sessions.    ASSESSMENT     Cyril Carlton tolerated PT session well with minimal complaints of pain or discomfort, secondary to poor motor control and decrease muscle endurance. Objective findings are  improving with measurements of ROM and functional mobility. Therapy exercises were reviewed by revisiting exercises given from previous home exercise program while adding more dynamic stability exercises. Handouts were not issued during today's visit. Cyril demonstrated good understanding of new exercises and will continue to progress at home until next follow-up.       Cyril Is progressing well towards his goals.   Pt prognosis is Excellent.     Pt will continue to benefit from skilled outpatient physical therapy to address the deficits listed in the problem list box on initial evaluation, provide pt/family education and to maximize pt's level of independence in the home and community environment.     Pt's spiritual, cultural and educational needs considered and pt agreeable to plan of care and goals.    Anticipated barriers to physical therapy: occupation    GOALS:  SHORT TERM GOALS: 4 weeks, (01/21/23) 03/22/23   Recent signs and systems trend is improving in order to progress towards LTG's.  PC   Patient will be independent with HEP in order to further progress and return to maximal function.  PC    Pain rating at Worst: 5/10 in order to progress towards increased independence with activity.  PC    Patient will be able to correct postural deviations in sitting and standing, to decrease pain and promote postural awareness for injury prevention.   PC       LONG TERM GOALS: 8  and continuing weeks, (02/21/23) 03/22/23   Patient will return to normal ADL, recreational, and work related activities with less pain and limitation.    PC   Patient will improve AROM to stated goals in order to return to maximal functional potential.    PC   Patient will improve Strength to stated goals of appropriate musculature in order to improve functional independence.   PC    Pain Rating at Best: 1/10 to improve Quality of Life.   PC    Patient will meet predicted functional outcome (FOTO) score: TBA% to increase self-worth &  perceived functional ability.  PC    Patient will have met/partially met personal goal of: being able to 20lbs overhead without pain   PC       PC = progressing/continue  PM= partially met  DC= discontinue    PLAN     Continue Plan of Care (POC) and progress per patient tolerance. See Treatment section for exercise progression.    Shayne Brody, PT, DPT

## 2023-04-05 ENCOUNTER — CLINICAL SUPPORT (OUTPATIENT)
Dept: REHABILITATION | Facility: HOSPITAL | Age: 28
End: 2023-04-05
Payer: OTHER GOVERNMENT

## 2023-04-05 DIAGNOSIS — R53.1 DECREASED RANGE OF MOTION WITH DECREASED STRENGTH: ICD-10-CM

## 2023-04-05 DIAGNOSIS — M54.9 CHRONIC NECK AND BACK PAIN: Primary | ICD-10-CM

## 2023-04-05 DIAGNOSIS — G89.29 CHRONIC NECK AND BACK PAIN: Primary | ICD-10-CM

## 2023-04-05 DIAGNOSIS — M54.2 CHRONIC NECK AND BACK PAIN: Primary | ICD-10-CM

## 2023-04-05 DIAGNOSIS — M25.60 DECREASED RANGE OF MOTION WITH DECREASED STRENGTH: ICD-10-CM

## 2023-04-05 PROCEDURE — 97112 NEUROMUSCULAR REEDUCATION: CPT

## 2023-04-05 PROCEDURE — 97140 MANUAL THERAPY 1/> REGIONS: CPT

## 2023-04-06 NOTE — PROGRESS NOTES
[facility]  Physical Therapy Treatment Note      Name: Cyril Carlton  [facility] Number: [id number]    Therapy Diagnosis:   Encounter Diagnoses   Name Primary?    Chronic neck and back pain Yes    Decreased range of motion with decreased strength      Physician: Lon Thomas MD  Visit Date: 4/5/2023  Physician Orders: PT Eval and Treat  Medical Diagnosis from Referral: Neck Pain   Evaluation Date: 12/21/2022  Authorization Period Expiration: 12/31/22  Plan of Care Expiration: 04/21/2023                          Progress Update: 03/22/2023                        FOTO: 0 / 3    Visit # / Visits authorized: 11 / 15 (+4 visits)                          PRECAUTIONS: Standard Precautions      Time In: 1515  Time Out: 1600  Total Billable Time: 40 minutes    SUBJECTIVE     Pt reports: improvement with ADLs. Starting to have pain in low back with picking objects off the ground  Compliance with Hep: Daily  Response to previous treatment: no adverse reactions to treatment/updated HEP  Functional change: No Change    Pain: 3/10   Worst: 7/10  Location: low back  Pain Control: rest and stretching   Aggravating factors: overhead movements     OBJECTIVE     Objective Measures updated at progress report unless specified otherwise.     FUNCTIONAL SCREEN:     Upper Extremity ROM Details STRENGTH Details   Shoulder WNL  discomfort Grossly 4+/5     Elbow WNL No Pain  Grossly 5/5     Hand/Wrist WNL No pain  Grossly 5/5        Treatment     Gym/Equipment Access: increase secondary to verbal cues   Time to Complete Exercises: Increase secondary to verbal/tactile cues     Cyril received therapeutic exercises to develop strength, endurance, ROM, flexibility, posture, and core stabilization for 0 minutes including:  UBE/ Bike  7 minutes    Standing Rows  3x10 red band    Prone Ws  3x8                                Cyril received the following manual therapy techniques: Joint mobilizations, Manual traction,  Myofacial release, Manual Lymphatic Drainage, Soft tissue Mobilization, and Friction Massage were applied to the: bilateral upper traps for 25 minutes, including:  Soft tissue x Upper traps/ scalenes/ supraspinatus    Joint Mobs x Cervical side bend    FDN x Upper traps/ right calf                                  Cyril participated in neuromuscular re-education activities to improve: Balance, Coordination, Kinesthetic, Proprioception, and Posture for 15 minutes. The following activities were included:  Wall [place]  x    Upper trap stretch  x    Chin Tucks  x    Open Books  x                        Home Exercises and Patient Education Provided     Education/Self-Care provided: (included in treatment) minutes   Patient educated on the impairments noted above and the effects of physical therapy intervention to improve overall condition and QOL.   Patient was educated on all the above exercise prior/during/after for proper posture, positioning, and execution for safe performance with home exercise program.  Exercise Prescription:   12/21/2022: EVAL- Low      Written Home Exercises Provided: yes. Prefers: Electronic Copies  Exercises were reviewed and Cyril was able to demonstrate them prior to the end of the session.  Cyril demonstrated good understanding of the education provided  See EMR under Patient Instructions for exercises provided during therapy sessions.    ASSESSMENT     Cyril Carlton tolerated PT session well with minimal complaints of pain or discomfort, secondary to poor motor control and decrease muscle endurance. Objective findings are improving with measurements of ROM and functional mobility. Therapy exercises were reviewed by revisiting exercises given from previous home exercise program while adding more dynamic stability exercises. Handouts were not issued during today's visit. Cyril demonstrated good understanding of new exercises and will continue to progress at home until next follow-up.        Cyril Is progressing well towards his goals.   Pt prognosis is Excellent.     Pt will continue to benefit from skilled outpatient physical therapy to address the deficits listed in the problem list box on initial evaluation, provide pt/family education and to maximize pt's level of independence in the home and community environment.     Pt's spiritual, cultural and educational needs considered and pt agreeable to plan of care and goals.    Anticipated barriers to physical therapy: occupation    GOALS:  SHORT TERM GOALS: 4 weeks, (01/21/23) 03/22/23   Recent signs and systems trend is improving in order to progress towards LTG's.  PC   Patient will be independent with HEP in order to further progress and return to maximal function.  PC    Pain rating at Worst: 5/10 in order to progress towards increased independence with activity.  PC    Patient will be able to correct postural deviations in sitting and standing, to decrease pain and promote postural awareness for injury prevention.   PC       LONG TERM GOALS: 8  and continuing weeks, (02/21/23) 03/22/23   Patient will return to normal ADL, recreational, and work related activities with less pain and limitation.    PC   Patient will improve AROM to stated goals in order to return to maximal functional potential.    PC   Patient will improve Strength to stated goals of appropriate musculature in order to improve functional independence.   PC    Pain Rating at Best: 1/10 to improve Quality of Life.   PC    Patient will meet predicted functional outcome (FOTO) score: TBA% to increase self-worth & perceived functional ability.  PC    Patient will have met/partially met personal goal of: being able to 20lbs overhead without pain   PC       PC = progressing/continue  PM= partially met  DC= discontinue    PLAN     Continue Plan of Care (POC) and progress per patient tolerance. See Treatment section for exercise progression.    Shayne Bordy, PT,  DPT

## 2023-04-11 ENCOUNTER — PATIENT MESSAGE (OUTPATIENT)
Dept: INTERNAL MEDICINE | Facility: CLINIC | Age: 28
End: 2023-04-11
Payer: OTHER GOVERNMENT

## 2023-04-11 DIAGNOSIS — R41.840 DIFFICULTY CONCENTRATING: ICD-10-CM

## 2023-04-11 DIAGNOSIS — Z65.8 RELATIONAL PROBLEM: ICD-10-CM

## 2023-04-11 DIAGNOSIS — F43.29 STRESS AND ADJUSTMENT REACTION: ICD-10-CM

## 2023-04-12 ENCOUNTER — PATIENT MESSAGE (OUTPATIENT)
Dept: INTERNAL MEDICINE | Facility: CLINIC | Age: 28
End: 2023-04-12
Payer: OTHER GOVERNMENT

## 2023-04-12 RX ORDER — VENLAFAXINE HYDROCHLORIDE 75 MG/1
75 CAPSULE, EXTENDED RELEASE ORAL DAILY
Qty: 30 CAPSULE | Refills: 0 | Status: SHIPPED | OUTPATIENT
Start: 2023-04-12 | End: 2023-05-10 | Stop reason: SDUPTHER

## 2023-04-14 ENCOUNTER — CLINICAL SUPPORT (OUTPATIENT)
Dept: REHABILITATION | Facility: HOSPITAL | Age: 28
End: 2023-04-14
Payer: OTHER GOVERNMENT

## 2023-04-14 DIAGNOSIS — R53.1 DECREASED RANGE OF MOTION WITH DECREASED STRENGTH: ICD-10-CM

## 2023-04-14 DIAGNOSIS — G89.29 CHRONIC NECK AND BACK PAIN: Primary | ICD-10-CM

## 2023-04-14 DIAGNOSIS — M25.60 DECREASED RANGE OF MOTION WITH DECREASED STRENGTH: ICD-10-CM

## 2023-04-14 DIAGNOSIS — M54.2 CHRONIC NECK AND BACK PAIN: Primary | ICD-10-CM

## 2023-04-14 DIAGNOSIS — M54.9 CHRONIC NECK AND BACK PAIN: Primary | ICD-10-CM

## 2023-04-14 PROCEDURE — 97140 MANUAL THERAPY 1/> REGIONS: CPT

## 2023-04-14 PROCEDURE — 97112 NEUROMUSCULAR REEDUCATION: CPT

## 2023-04-14 NOTE — PROGRESS NOTES
[facility]  Physical Therapy Treatment Note      Name: Cyril Carlton  [facility] Number: [id number]    Therapy Diagnosis:   Encounter Diagnoses   Name Primary?    Chronic neck and back pain Yes    Decreased range of motion with decreased strength      Physician: Lon Thomas MD  Visit Date: 4/14/2023  Physician Orders: PT Eval and Treat  Medical Diagnosis from Referral: Neck Pain   Evaluation Date: 12/21/2022  Authorization Period Expiration: 12/31/22  Plan of Care Expiration: 04/21/2023                          Progress Update: 03/22/2023                        FOTO: 0 / 3    Visit # / Visits authorized: 12 / 15 (+4 visits)                          PRECAUTIONS: Standard Precautions      Time In: 0945  Time Out: 1030  Total Billable Time: 40 minutes    SUBJECTIVE     Pt reports: improvement with ADLs. Starting to have pain in low back with picking objects off the ground  Compliance with Hep: Daily  Response to previous treatment: no adverse reactions to treatment/updated HEP  Functional change: No Change    Pain: 3/10   Worst: 7/10  Location: low back  Pain Control: rest and stretching   Aggravating factors: overhead movements     OBJECTIVE     Objective Measures updated at progress report unless specified otherwise.     FUNCTIONAL SCREEN:     Upper Extremity ROM Details STRENGTH Details   Shoulder WNL  discomfort Grossly 4+/5     Elbow WNL No Pain  Grossly 5/5     Hand/Wrist WNL No pain  Grossly 5/5        Treatment     Gym/Equipment Access: increase secondary to verbal cues   Time to Complete Exercises: Increase secondary to verbal/tactile cues     Cyril received therapeutic exercises to develop strength, endurance, ROM, flexibility, posture, and core stabilization for 0 minutes including:  UBE/ Bike  7 minutes    Standing Rows  3x10 red band    Prone Ws  3x8                                Cyril received the following manual therapy techniques: Joint mobilizations, Manual traction,  Myofacial release, Manual Lymphatic Drainage, Soft tissue Mobilization, and Friction Massage were applied to the: bilateral upper traps for 25 minutes, including:  Soft tissue x Upper traps/ scalenes/ supraspinatus    Joint Mobs x Cervical side bend    FDN x Upper traps/ right calf                                  Cyril participated in neuromuscular re-education activities to improve: Balance, Coordination, Kinesthetic, Proprioception, and Posture for 15 minutes. The following activities were included:  Wall [place]  x    Upper trap stretch  x    Chin Tucks  x    Open Books  x                        Home Exercises and Patient Education Provided     Education/Self-Care provided: (included in treatment) minutes   Patient educated on the impairments noted above and the effects of physical therapy intervention to improve overall condition and QOL.   Patient was educated on all the above exercise prior/during/after for proper posture, positioning, and execution for safe performance with home exercise program.  Exercise Prescription:   12/21/2022: EVAL- Low      Written Home Exercises Provided: yes. Prefers: Electronic Copies  Exercises were reviewed and Cyril was able to demonstrate them prior to the end of the session.  Cyril demonstrated good understanding of the education provided  See EMR under Patient Instructions for exercises provided during therapy sessions.    ASSESSMENT     Cyril Carlton tolerated PT session well with minimal complaints of pain or discomfort, secondary to poor motor control and decrease muscle endurance. Objective findings are improving with measurements of ROM and functional mobility. Therapy exercises were reviewed by revisiting exercises given from previous home exercise program while adding more dynamic stability exercises. Handouts were not issued during today's visit. Cyril demonstrated good understanding of new exercises and will continue to progress at home until next follow-up.        Cyril Is progressing well towards his goals.   Pt prognosis is Excellent.     Pt will continue to benefit from skilled outpatient physical therapy to address the deficits listed in the problem list box on initial evaluation, provide pt/family education and to maximize pt's level of independence in the home and community environment.     Pt's spiritual, cultural and educational needs considered and pt agreeable to plan of care and goals.    Anticipated barriers to physical therapy: occupation    GOALS:  SHORT TERM GOALS: 4 weeks, (01/21/23) 03/22/23   Recent signs and systems trend is improving in order to progress towards LTG's.  PC   Patient will be independent with HEP in order to further progress and return to maximal function.  PC    Pain rating at Worst: 5/10 in order to progress towards increased independence with activity.  PC    Patient will be able to correct postural deviations in sitting and standing, to decrease pain and promote postural awareness for injury prevention.   PC       LONG TERM GOALS: 8  and continuing weeks, (02/21/23) 03/22/23   Patient will return to normal ADL, recreational, and work related activities with less pain and limitation.    PC   Patient will improve AROM to stated goals in order to return to maximal functional potential.    PC   Patient will improve Strength to stated goals of appropriate musculature in order to improve functional independence.   PC    Pain Rating at Best: 1/10 to improve Quality of Life.   PC    Patient will meet predicted functional outcome (FOTO) score: TBA% to increase self-worth & perceived functional ability.  PC    Patient will have met/partially met personal goal of: being able to 20lbs overhead without pain   PC       PC = progressing/continue  PM= partially met  DC= discontinue    PLAN     Continue Plan of Care (POC) and progress per patient tolerance. See Treatment section for exercise progression.    Shayne Brody, PT,  DPT

## 2023-04-19 ENCOUNTER — CLINICAL SUPPORT (OUTPATIENT)
Dept: REHABILITATION | Facility: HOSPITAL | Age: 28
End: 2023-04-19
Payer: OTHER GOVERNMENT

## 2023-04-19 DIAGNOSIS — M54.9 CHRONIC NECK AND BACK PAIN: Primary | ICD-10-CM

## 2023-04-19 DIAGNOSIS — M54.2 CHRONIC NECK AND BACK PAIN: Primary | ICD-10-CM

## 2023-04-19 DIAGNOSIS — G89.29 CHRONIC NECK AND BACK PAIN: Primary | ICD-10-CM

## 2023-04-19 DIAGNOSIS — M25.60 DECREASED RANGE OF MOTION WITH DECREASED STRENGTH: ICD-10-CM

## 2023-04-19 DIAGNOSIS — R53.1 DECREASED RANGE OF MOTION WITH DECREASED STRENGTH: ICD-10-CM

## 2023-04-19 PROCEDURE — 97140 MANUAL THERAPY 1/> REGIONS: CPT

## 2023-04-19 PROCEDURE — 97112 NEUROMUSCULAR REEDUCATION: CPT

## 2023-04-20 NOTE — PLAN OF CARE
[facility]  Physical Therapy Plan of Care Note     Name: Cyril Carlton  [facility] Number: [id number]    Therapy Diagnosis:   Encounter Diagnoses   Name Primary?    Chronic neck and back pain Yes    Decreased range of motion with decreased strength      Physician: Lon Thomas MD  Visit Date: 4/19/2023  Medical Diagnosis from Referral: Neck Pain   Evaluation Date: 12/21/2022  Authorization Period Expiration: 12/31/22  Plan of Care Expiration: 05/21/2023                          Progress Update: 04/20/2023                        FOTO: 0 / 3    Visit # / Visits authorized: 13 / 15 (+4 visits)                          PRECAUTIONS: Standard Precautions       Precautions: Standard  Functional Level Prior to Evaluation:  Independent     SUBJECTIVE     Update: See treatment note     OBJECTIVE     Update: See treatment note     ASSESSMENT     Update: See treatment note     Long Term Goal Status: continue per initial plan of care.  Reasons for Recertification of Therapy:   Acute on chronic flair up     GOALS:  SHORT TERM GOALS: 4 weeks, (01/21/23) 03/22/23   Recent signs and systems trend is improving in order to progress towards LTG's.  PC   Patient will be independent with HEP in order to further progress and return to maximal function.  PC    Pain rating at Worst: 5/10 in order to progress towards increased independence with activity.  PC    Patient will be able to correct postural deviations in sitting and standing, to decrease pain and promote postural awareness for injury prevention.   PC       LONG TERM GOALS: 8  and continuing weeks, (02/21/23) 03/22/23   Patient will return to normal ADL, recreational, and work related activities with less pain and limitation.    PC   Patient will improve AROM to stated goals in order to return to maximal functional potential.    PC   Patient will improve Strength to stated goals of appropriate musculature in order to improve functional independence.   PC     Pain Rating at Best: 1/10 to improve Quality of Life.   PC    Patient will meet predicted functional outcome (FOTO) score: TBA% to increase self-worth & perceived functional ability.  PC    Patient will have met/partially met personal goal of: being able to 20lbs overhead without pain   PC       PLAN     Updated Certification Period: 04/19/23 to 05/21/23   Recommended Treatment Plan: 1 times per week for 4 weeks:  Aquatic Therapy, Cervical/Lumbar Traction, Gait Training, Manual Therapy, Moist Heat/ Ice, Neuromuscular Re-ed, Patient Education, Self Care, and Therapeutic Activities  Other Recommendations:     Shayne Brody, PT DPT

## 2023-04-20 NOTE — PROGRESS NOTES
[facility] OUTPATIENT THERAPY AND WELLNESS  Physical Therapy Treatment Note + Updated Plan of Care Note      Name: Cyril Carlton  Clinic Number: [id number]    Therapy Diagnosis:   Encounter Diagnoses   Name Primary?    Chronic neck and back pain Yes    Decreased range of motion with decreased strength      Physician: Lon Thomas MD  Visit Date: 4/19/2023  Physician Orders: PT Eval and Treat  Medical Diagnosis from Referral: Neck Pain   Evaluation Date: 12/21/2022  Authorization Period Expiration: 12/31/22  Plan of Care Expiration: 05/21/2023                          Progress Update: 04/20/2023                        FOTO: 0 / 3    Visit # / Visits authorized: 13 / 15 (+4 visits)                          PRECAUTIONS: Standard Precautions      Time In: 1430  Time Out: 1515  Total Billable Time: 40 minutes    SUBJECTIVE     Pt reports: flair up in low back pain after picking up objects in office.   Compliance with Hep: Daily  Response to previous treatment: no adverse reactions to treatment/updated HEP  Functional change: No Change    Pain: 5/10   Worst: 7/10  Location: low back  Pain Control: rest and stretching   Aggravating factors: overhead movements     OBJECTIVE     Objective Measures updated at progress report unless specified otherwise.     FUNCTIONAL SCREEN:     Upper Extremity ROM Details STRENGTH Details   Shoulder WNL  discomfort Grossly 4+/5     Elbow WNL No Pain  Grossly 5/5     Hand/Wrist WNL No pain  Grossly 5/5        Gait Analysis: The patient ambulated with the following assistive device: none; the pt presents with the following gait abnormalities: decreased step length, jessica, and arm swing;      Movement Analysis:   Functional Test  Outcome   Double Leg Squat BUE support required secondary to pain    Single Leg Step Down Trendelenburg sign bilaterally; pain limited patient's ability to perform activity      Treatment     Gym/Equipment Access: increase secondary to verbal cues   Time to  Complete Exercises: Increase secondary to verbal/tactile cues     Cyril received therapeutic exercises to develop strength, endurance, ROM, flexibility, posture, and core stabilization for 0 minutes including:  UBE/ Bike  7 minutes    Standing Rows  3x10 red band    Prone Ws  3x8                                Cyril received the following manual therapy techniques: Joint mobilizations, Manual traction, Myofacial release, Manual Lymphatic Drainage, Soft tissue Mobilization, and Friction Massage were applied to the: bilateral upper traps for 30 minutes, including:  Soft tissue x Upper traps/ scalenes/ supraspinatus    Joint Mobs x Cervical side bend    FDN x Upper traps/ right calf                                  Cyril participated in neuromuscular re-education activities to improve: Balance, Coordination, Kinesthetic, Proprioception, and Posture for 10 minutes. The following activities were included:  Wall [place]      Upper trap stretch  x    Chin Tucks      Open Books  x                        Home Exercises and Patient Education Provided     Education/Self-Care provided: (included in treatment) minutes   Patient educated on the impairments noted above and the effects of physical therapy intervention to improve overall condition and QOL.   Patient was educated on all the above exercise prior/during/after for proper posture, positioning, and execution for safe performance with home exercise program.  Exercise Prescription:   12/21/2022: EVAL- Low      Written Home Exercises Provided: yes. Prefers: Electronic Copies  Exercises were reviewed and Cyril was able to demonstrate them prior to the end of the session.  Cyril demonstrated good understanding of the education provided  See EMR under Patient Instructions for exercises provided during therapy sessions.    ASSESSMENT     Cyril Carlton tolerated PT session well with minimal complaints of pain or discomfort, secondary to poor motor control and decrease muscle  endurance. Objective findings are improving with measurements of ROM and functional mobility. Therapy exercises were reviewed by revisiting exercises given from previous home exercise program while adding more dynamic stability exercises. Handouts were not issued during today's visit. Cyril demonstrated good understanding of new exercises and will continue to progress at home until next follow-up.       Cyril Is progressing well towards his goals.   Pt prognosis is Excellent.     Pt will continue to benefit from skilled outpatient physical therapy to address the deficits listed in the problem list box on initial evaluation, provide pt/family education and to maximize pt's level of independence in the home and community environment.     Pt's spiritual, cultural and educational needs considered and pt agreeable to plan of care and goals.    Anticipated barriers to physical therapy: occupation    GOALS:  SHORT TERM GOALS: 4 weeks, (01/21/23) 03/22/23   Recent signs and systems trend is improving in order to progress towards LTG's.  PC   Patient will be independent with HEP in order to further progress and return to maximal function.  PC    Pain rating at Worst: 5/10 in order to progress towards increased independence with activity.  PC    Patient will be able to correct postural deviations in sitting and standing, to decrease pain and promote postural awareness for injury prevention.   PC       LONG TERM GOALS: 8  and continuing weeks, (02/21/23) 03/22/23   Patient will return to normal ADL, recreational, and work related activities with less pain and limitation.    PC   Patient will improve AROM to stated goals in order to return to maximal functional potential.    PC   Patient will improve Strength to stated goals of appropriate musculature in order to improve functional independence.   PC    Pain Rating at Best: 1/10 to improve Quality of Life.   PC    Patient will meet predicted functional outcome (FOTO) score:  TBA% to increase self-worth & perceived functional ability.  PC    Patient will have met/partially met personal goal of: being able to 20lbs overhead without pain   PC       PC = progressing/continue  PM= partially met  DC= discontinue    PLAN     Continue Plan of Care (POC) and progress per patient tolerance. See Treatment section for exercise progression.    Shayne Brody, PT, DPT

## 2023-04-25 ENCOUNTER — CLINICAL SUPPORT (OUTPATIENT)
Dept: REHABILITATION | Facility: HOSPITAL | Age: 28
End: 2023-04-25
Payer: OTHER GOVERNMENT

## 2023-04-25 DIAGNOSIS — G89.29 CHRONIC NECK AND BACK PAIN: Primary | ICD-10-CM

## 2023-04-25 DIAGNOSIS — M25.60 DECREASED RANGE OF MOTION WITH DECREASED STRENGTH: ICD-10-CM

## 2023-04-25 DIAGNOSIS — M54.9 CHRONIC NECK AND BACK PAIN: Primary | ICD-10-CM

## 2023-04-25 DIAGNOSIS — R53.1 DECREASED RANGE OF MOTION WITH DECREASED STRENGTH: ICD-10-CM

## 2023-04-25 DIAGNOSIS — M54.2 CHRONIC NECK AND BACK PAIN: Primary | ICD-10-CM

## 2023-04-25 PROCEDURE — 97112 NEUROMUSCULAR REEDUCATION: CPT

## 2023-04-25 PROCEDURE — 97140 MANUAL THERAPY 1/> REGIONS: CPT

## 2023-04-26 NOTE — PROGRESS NOTES
[facility]  Physical Therapy Treatment Note      Name: Cyril Carlton  [facility] Number: [id number]    Therapy Diagnosis:   Encounter Diagnoses   Name Primary?    Chronic neck and back pain Yes    Decreased range of motion with decreased strength      Physician: Lon Thomas MD  Visit Date: 4/25/2023  Physician Orders: PT Eval and Treat  Medical Diagnosis from Referral: Neck Pain   Evaluation Date: 12/21/2022  Authorization Period Expiration: 12/31/22  Plan of Care Expiration: 05/21/2023                          Progress Update: 04/20/2023                        FOTO: 0 / 3    Visit # / Visits authorized: 13 / 15 (+4 visits)                          PRECAUTIONS: Standard Precautions      Time In: 1430  Time Out: 1515  Total Billable Time: 40 minutes    SUBJECTIVE     Pt reports: flair up in low back pain after picking up objects in office.   Compliance with Hep: Daily  Response to previous treatment: no adverse reactions to treatment/updated HEP  Functional change: No Change    Pain: 5/10   Worst: 7/10  Location: low back  Pain Control: rest and stretching   Aggravating factors: overhead movements     OBJECTIVE     Objective Measures updated at progress report unless specified otherwise.     FUNCTIONAL SCREEN:     Upper Extremity ROM Details STRENGTH Details   Shoulder WNL  discomfort Grossly 4+/5     Elbow WNL No Pain  Grossly 5/5     Hand/Wrist WNL No pain  Grossly 5/5        Gait Analysis: The patient ambulated with the following assistive device: none; the pt presents with the following gait abnormalities: decreased step length, jessica, and arm swing;      Movement Analysis:   Functional Test  Outcome   Double Leg Squat BUE support required secondary to pain    Single Leg Step Down Trendelenburg sign bilaterally; pain limited patient's ability to perform activity      Treatment     Gym/Equipment Access: increase secondary to verbal cues   Time to Complete Exercises: Increase  secondary to verbal/tactile cues     Cyril received therapeutic exercises to develop strength, endurance, ROM, flexibility, posture, and core stabilization for 0 minutes including:  UBE/ Bike  7 minutes    Standing Rows  3x10 red band    Prone Ws  3x8                                Cyril received the following manual therapy techniques: Joint mobilizations, Manual traction, Myofacial release, Manual Lymphatic Drainage, Soft tissue Mobilization, and Friction Massage were applied to the: bilateral upper traps for 30 minutes, including:  Soft tissue x Upper traps/ scalenes/ supraspinatus    Joint Mobs x Cervical side bend    FDN x Upper traps/ right calf                                  Cyril participated in neuromuscular re-education activities to improve: Balance, Coordination, Kinesthetic, Proprioception, and Posture for 10 minutes. The following activities were included:  Wall [place]      Upper trap stretch  x    Chin Tucks      Open Books  x                        Home Exercises and Patient Education Provided     Education/Self-Care provided: (included in treatment) minutes   Patient educated on the impairments noted above and the effects of physical therapy intervention to improve overall condition and QOL.   Patient was educated on all the above exercise prior/during/after for proper posture, positioning, and execution for safe performance with home exercise program.  Exercise Prescription:   12/21/2022: EVAL- Low      Written Home Exercises Provided: yes. Prefers: Electronic Copies  Exercises were reviewed and Cyril was able to demonstrate them prior to the end of the session.  Cyril demonstrated good understanding of the education provided  See EMR under Patient Instructions for exercises provided during therapy sessions.    ASSESSMENT     Cyril Carlton tolerated PT session well with minimal complaints of pain or discomfort, secondary to poor motor control and decrease muscle endurance. Objective findings are  improving with measurements of ROM and functional mobility. Therapy exercises were reviewed by revisiting exercises given from previous home exercise program while adding more dynamic stability exercises. Handouts were not issued during today's visit. Cyril demonstrated good understanding of new exercises and will continue to progress at home until next follow-up.       Cyril Is progressing well towards his goals.   Pt prognosis is Excellent.     Pt will continue to benefit from skilled outpatient physical therapy to address the deficits listed in the problem list box on initial evaluation, provide pt/family education and to maximize pt's level of independence in the home and community environment.     Pt's spiritual, cultural and educational needs considered and pt agreeable to plan of care and goals.    Anticipated barriers to physical therapy: occupation    GOALS:  SHORT TERM GOALS: 4 weeks, (01/21/23) 03/22/23   Recent signs and systems trend is improving in order to progress towards LTG's.  PC   Patient will be independent with HEP in order to further progress and return to maximal function.  PC    Pain rating at Worst: 5/10 in order to progress towards increased independence with activity.  PC    Patient will be able to correct postural deviations in sitting and standing, to decrease pain and promote postural awareness for injury prevention.   PC       LONG TERM GOALS: 8  and continuing weeks, (02/21/23) 03/22/23   Patient will return to normal ADL, recreational, and work related activities with less pain and limitation.    PC   Patient will improve AROM to stated goals in order to return to maximal functional potential.    PC   Patient will improve Strength to stated goals of appropriate musculature in order to improve functional independence.   PC    Pain Rating at Best: 1/10 to improve Quality of Life.   PC    Patient will meet predicted functional outcome (FOTO) score: TBA% to increase self-worth &  perceived functional ability.  PC    Patient will have met/partially met personal goal of: being able to 20lbs overhead without pain   PC       PC = progressing/continue  PM= partially met  DC= discontinue    PLAN     Continue Plan of Care (POC) and progress per patient tolerance. See Treatment section for exercise progression.    Shayne Brody, PT, DPT

## 2023-05-03 ENCOUNTER — CLINICAL SUPPORT (OUTPATIENT)
Dept: REHABILITATION | Facility: HOSPITAL | Age: 28
End: 2023-05-03
Payer: OTHER GOVERNMENT

## 2023-05-03 DIAGNOSIS — M54.2 CHRONIC NECK AND BACK PAIN: Primary | ICD-10-CM

## 2023-05-03 DIAGNOSIS — R53.1 DECREASED RANGE OF MOTION WITH DECREASED STRENGTH: ICD-10-CM

## 2023-05-03 DIAGNOSIS — M25.60 DECREASED RANGE OF MOTION WITH DECREASED STRENGTH: ICD-10-CM

## 2023-05-03 DIAGNOSIS — M54.9 CHRONIC NECK AND BACK PAIN: Primary | ICD-10-CM

## 2023-05-03 DIAGNOSIS — G89.29 CHRONIC NECK AND BACK PAIN: Primary | ICD-10-CM

## 2023-05-03 PROCEDURE — 97140 MANUAL THERAPY 1/> REGIONS: CPT

## 2023-05-03 PROCEDURE — 97112 NEUROMUSCULAR REEDUCATION: CPT

## 2023-05-05 NOTE — PROGRESS NOTES
[facility]  Physical Therapy Treatment Note      Name: Cyril Carlton  [facility] Number: [id number]    Therapy Diagnosis:   Encounter Diagnoses   Name Primary?    Chronic neck and back pain Yes    Decreased range of motion with decreased strength      Physician: Lon Thomas MD  Visit Date: 5/3/2023  Physician Orders: PT Eval and Treat  Medical Diagnosis from Referral: Neck Pain   Evaluation Date: 12/21/2022  Authorization Period Expiration: 12/31/22  Plan of Care Expiration: 05/21/2023                          Progress Update: 04/20/2023                        FOTO: 0 / 3    Visit # / Visits authorized: 15 / 30 (+4 visits)                          PRECAUTIONS: Standard Precautions      Time In: 1515  Time Out: 1600  Total Billable Time: 40 minutes    SUBJECTIVE     Pt reports: continued low back pain. Has reduce strength training and increase cardio to help reduce back discomfort.    Compliance with Hep: Daily  Response to previous treatment: no adverse reactions to treatment/updated HEP  Functional change: No Change    Pain: 5/10   Worst: 7/10  Location: low back  Pain Control: rest and stretching   Aggravating factors: overhead movements     OBJECTIVE     Objective Measures updated at progress report unless specified otherwise.      Treatment     Gym/Equipment Access: increase secondary to verbal cues   Time to Complete Exercises: Increase secondary to verbal/tactile cues     Cyril received therapeutic exercises to develop strength, endurance, ROM, flexibility, posture, and core stabilization for 0 minutes including:  UBE/ Bike  7 minutes    Standing Rows  3x10 red band    Prone Ws  3x8                                Cyril received the following manual therapy techniques: Joint mobilizations, Manual traction, Myofacial release, Manual Lymphatic Drainage, Soft tissue Mobilization, and Friction Massage were applied to the: bilateral upper traps for 30 minutes, including:  Soft tissue x  Upper traps/ scalenes/ supraspinatus/ QLs    Joint Mobs x Cervical side bend    FDN x Upper traps/ low back region                                 Cyril participated in neuromuscular re-education activities to improve: Balance, Coordination, Kinesthetic, Proprioception, and Posture for 10 minutes. The following activities were included:  Wall [place]  x    Upper trap stretch  x    Chin Tucks      Open Books  x                        Home Exercises and Patient Education Provided     Education/Self-Care provided: (included in treatment) minutes   Patient educated on the impairments noted above and the effects of physical therapy intervention to improve overall condition and QOL.   Patient was educated on all the above exercise prior/during/after for proper posture, positioning, and execution for safe performance with home exercise program.  Exercise Prescription:   12/21/2022: GAVINOAL- Low      Written Home Exercises Provided: yes. Prefers: Electronic Copies  Exercises were reviewed and Cyril was able to demonstrate them prior to the end of the session.  Cyril demonstrated good understanding of the education provided  See EMR under Patient Instructions for exercises provided during therapy sessions.    ASSESSMENT     Cyril Carlton tolerated PT session well with minimal complaints of pain or discomfort, secondary to poor motor control and decrease muscle endurance. Objective findings are improving with measurements of ROM and functional mobility. Therapy exercises were reviewed by revisiting exercises given from previous home exercise program while adding more dynamic stability exercises. Handouts were not issued during today's visit. Cyril demonstrated good understanding of new exercises and will continue to progress at home until next follow-up.       Cyril Is progressing well towards his goals.   Pt prognosis is Excellent.     Pt will continue to benefit from skilled outpatient physical therapy to address the deficits  listed in the problem list box on initial evaluation, provide pt/family education and to maximize pt's level of independence in the home and community environment.     Pt's spiritual, cultural and educational needs considered and pt agreeable to plan of care and goals.    Anticipated barriers to physical therapy: occupation    GOALS:  SHORT TERM GOALS: 4 weeks, (01/21/23) 03/22/23   Recent signs and systems trend is improving in order to progress towards LTG's.  PC   Patient will be independent with HEP in order to further progress and return to maximal function.  PC    Pain rating at Worst: 5/10 in order to progress towards increased independence with activity.  PC    Patient will be able to correct postural deviations in sitting and standing, to decrease pain and promote postural awareness for injury prevention.   PC       LONG TERM GOALS: 8  and continuing weeks, (02/21/23) 03/22/23   Patient will return to normal ADL, recreational, and work related activities with less pain and limitation.    PC   Patient will improve AROM to stated goals in order to return to maximal functional potential.    PC   Patient will improve Strength to stated goals of appropriate musculature in order to improve functional independence.   PC    Pain Rating at Best: 1/10 to improve Quality of Life.   PC    Patient will meet predicted functional outcome (FOTO) score: TBA% to increase self-worth & perceived functional ability.  PC    Patient will have met/partially met personal goal of: being able to 20lbs overhead without pain   PC       PC = progressing/continue  PM= partially met  DC= discontinue    PLAN     Continue Plan of Care (POC) and progress per patient tolerance. See Treatment section for exercise progression.    Shayne Brody, PT, DPT

## 2023-05-10 ENCOUNTER — PATIENT MESSAGE (OUTPATIENT)
Dept: INTERNAL MEDICINE | Facility: CLINIC | Age: 28
End: 2023-05-10
Payer: OTHER GOVERNMENT

## 2023-05-10 ENCOUNTER — CLINICAL SUPPORT (OUTPATIENT)
Dept: REHABILITATION | Facility: HOSPITAL | Age: 28
End: 2023-05-10
Payer: OTHER GOVERNMENT

## 2023-05-10 DIAGNOSIS — R53.1 DECREASED RANGE OF MOTION WITH DECREASED STRENGTH: ICD-10-CM

## 2023-05-10 DIAGNOSIS — R41.840 DIFFICULTY CONCENTRATING: ICD-10-CM

## 2023-05-10 DIAGNOSIS — M54.9 CHRONIC NECK AND BACK PAIN: Primary | ICD-10-CM

## 2023-05-10 DIAGNOSIS — F43.29 STRESS AND ADJUSTMENT REACTION: ICD-10-CM

## 2023-05-10 DIAGNOSIS — G89.29 CHRONIC NECK AND BACK PAIN: Primary | ICD-10-CM

## 2023-05-10 DIAGNOSIS — Z65.8 RELATIONAL PROBLEM: ICD-10-CM

## 2023-05-10 DIAGNOSIS — M54.2 CHRONIC NECK AND BACK PAIN: Primary | ICD-10-CM

## 2023-05-10 DIAGNOSIS — M25.60 DECREASED RANGE OF MOTION WITH DECREASED STRENGTH: ICD-10-CM

## 2023-05-10 PROCEDURE — 97112 NEUROMUSCULAR REEDUCATION: CPT

## 2023-05-10 PROCEDURE — 97140 MANUAL THERAPY 1/> REGIONS: CPT

## 2023-05-10 RX ORDER — VENLAFAXINE HYDROCHLORIDE 75 MG/1
75 CAPSULE, EXTENDED RELEASE ORAL DAILY
Qty: 90 CAPSULE | Refills: 0 | Status: SHIPPED | OUTPATIENT
Start: 2023-05-10 | End: 2023-07-24 | Stop reason: SDUPTHER

## 2023-05-11 ENCOUNTER — PATIENT MESSAGE (OUTPATIENT)
Dept: INTERNAL MEDICINE | Facility: CLINIC | Age: 28
End: 2023-05-11
Payer: OTHER GOVERNMENT

## 2023-05-11 NOTE — PROGRESS NOTES
[facility]  Physical Therapy Treatment Note      Name: Cyril Carlton  [facility] Number: [id number]    Therapy Diagnosis:   Encounter Diagnoses   Name Primary?    Chronic neck and back pain Yes    Decreased range of motion with decreased strength      Physician: Lon Thomas MD  Visit Date: 5/10/2023  Physician Orders: PT Eval and Treat  Medical Diagnosis from Referral: Neck Pain   Evaluation Date: 12/21/2022  Authorization Period Expiration: 12/31/22  Plan of Care Expiration: 05/21/2023                          Progress Update: 04/20/2023                        FOTO: 0 / 3    Visit # / Visits authorized: 16 / 30 (+4 visits)                          PRECAUTIONS: Standard Precautions      Time In: 1515  Time Out: 1600  Total Billable Time: 40 minutes    SUBJECTIVE     Pt reports: continued low back pain.   Compliance with Hep: Daily  Response to previous treatment: no adverse reactions to treatment/updated HEP  Functional change: No Change    Pain: 6/10   Worst: 7/10  Location: low back  Pain Control: rest and stretching   Aggravating factors: overhead movements     OBJECTIVE     Objective Measures updated at progress report unless specified otherwise.    Treatment     Gym/Equipment Access: increase secondary to verbal cues   Time to Complete Exercises: Increase secondary to verbal/tactile cues     Cyril received therapeutic exercises to develop strength, endurance, ROM, flexibility, posture, and core stabilization for 0 minutes including:  UBE/ Bike  7 minutes    Standing Rows  3x10 red band    Prone Ws  3x8                                Cyril received the following manual therapy techniques: Joint mobilizations, Manual traction, Myofacial release, Manual Lymphatic Drainage, Soft tissue Mobilization, and Friction Massage were applied to the: bilateral upper traps for 30 minutes, including:  Soft tissue x Upper traps/ scalenes/ supraspinatus/ QLs    Joint Mobs x Cervical side bend     FDN x Upper traps/ low back region                                 Cyril participated in neuromuscular re-education activities to improve: Balance, Coordination, Kinesthetic, Proprioception, and Posture for 10 minutes. The following activities were included:  Wall [place]      Upper trap stretch  x    Chin Tucks      Open Books  x                        Home Exercises and Patient Education Provided     Education/Self-Care provided: (included in treatment) minutes   Patient educated on the impairments noted above and the effects of physical therapy intervention to improve overall condition and QOL.   Patient was educated on all the above exercise prior/during/after for proper posture, positioning, and execution for safe performance with home exercise program.  Exercise Prescription:   12/21/2022: GAVINOAL- Low      Written Home Exercises Provided: yes. Prefers: Electronic Copies  Exercises were reviewed and Cyril was able to demonstrate them prior to the end of the session.  Cyril demonstrated good understanding of the education provided  See EMR under Patient Instructions for exercises provided during therapy sessions.    ASSESSMENT     Cyril Carlton tolerated PT session well with minimal complaints of pain or discomfort, secondary to poor motor control and decrease muscle endurance. Objective findings are improving with measurements of ROM and functional mobility. Therapy exercises were reviewed by revisiting exercises given from previous home exercise program while adding more dynamic stability exercises. Handouts were not issued during today's visit. Cyril demonstrated good understanding of new exercises and will continue to progress at home until next follow-up.       Cyril Is progressing well towards his goals.   Pt prognosis is Excellent.     Pt will continue to benefit from skilled outpatient physical therapy to address the deficits listed in the problem list box on initial evaluation, provide pt/family education  and to maximize pt's level of independence in the home and community environment.     Pt's spiritual, cultural and educational needs considered and pt agreeable to plan of care and goals.    Anticipated barriers to physical therapy: occupation    GOALS:  SHORT TERM GOALS: 4 weeks, (01/21/23) 03/22/23   Recent signs and systems trend is improving in order to progress towards LTG's.  PC   Patient will be independent with HEP in order to further progress and return to maximal function.  PC    Pain rating at Worst: 5/10 in order to progress towards increased independence with activity.  PC    Patient will be able to correct postural deviations in sitting and standing, to decrease pain and promote postural awareness for injury prevention.   PC       LONG TERM GOALS: 8  and continuing weeks, (02/21/23) 03/22/23   Patient will return to normal ADL, recreational, and work related activities with less pain and limitation.    PC   Patient will improve AROM to stated goals in order to return to maximal functional potential.    PC   Patient will improve Strength to stated goals of appropriate musculature in order to improve functional independence.   PC    Pain Rating at Best: 1/10 to improve Quality of Life.   PC    Patient will meet predicted functional outcome (FOTO) score: TBA% to increase self-worth & perceived functional ability.  PC    Patient will have met/partially met personal goal of: being able to 20lbs overhead without pain   PC       PC = progressing/continue  PM= partially met  DC= discontinue    PLAN     Continue Plan of Care (POC) and progress per patient tolerance. See Treatment section for exercise progression.    Shayne Brody, PT, DPT

## 2023-05-18 ENCOUNTER — CLINICAL SUPPORT (OUTPATIENT)
Dept: REHABILITATION | Facility: HOSPITAL | Age: 28
End: 2023-05-18
Payer: OTHER GOVERNMENT

## 2023-05-18 DIAGNOSIS — G89.29 CHRONIC NECK AND BACK PAIN: Primary | ICD-10-CM

## 2023-05-18 DIAGNOSIS — M54.9 CHRONIC NECK AND BACK PAIN: Primary | ICD-10-CM

## 2023-05-18 DIAGNOSIS — M54.2 CHRONIC NECK AND BACK PAIN: Primary | ICD-10-CM

## 2023-05-18 DIAGNOSIS — R53.1 DECREASED RANGE OF MOTION WITH DECREASED STRENGTH: ICD-10-CM

## 2023-05-18 DIAGNOSIS — M25.60 DECREASED RANGE OF MOTION WITH DECREASED STRENGTH: ICD-10-CM

## 2023-05-18 PROCEDURE — 97140 MANUAL THERAPY 1/> REGIONS: CPT

## 2023-05-18 PROCEDURE — 97112 NEUROMUSCULAR REEDUCATION: CPT

## 2023-05-18 NOTE — PROGRESS NOTES
[facility]  Physical Therapy Treatment Note      Name: Cyril Carlton  [facility] Number: [id number]    Therapy Diagnosis:   Encounter Diagnoses   Name Primary?    Chronic neck and back pain Yes    Decreased range of motion with decreased strength      Physician: Lon Thomas MD  Visit Date: 5/18/2023  Physician Orders: PT Eval and Treat  Medical Diagnosis from Referral: Neck Pain   Evaluation Date: 12/21/2022  Authorization Period Expiration: 12/31/22  Plan of Care Expiration: 05/21/2023                          Progress Update: 04/20/2023                        FOTO: 0 / 3    Visit # / Visits authorized: 17 / 30 (+4 visits)                          PRECAUTIONS: Standard Precautions      Time In: 1330  Time Out: 1415  Total Billable Time: 40 minutes    SUBJECTIVE     Pt reports: continued low back pain. Pain more noticeable on right side.   Compliance with Hep: Daily  Response to previous treatment: no adverse reactions to treatment/updated HEP  Functional change: No Change    Pain: 4/10   Worst: 7/10  Location: low back  Pain Control: rest and stretching   Aggravating factors: overhead movements     OBJECTIVE     Objective Measures updated at progress report unless specified otherwise.    Treatment     Gym/Equipment Access: increase secondary to verbal cues   Time to Complete Exercises: Increase secondary to verbal/tactile cues     Cyril received therapeutic exercises to develop strength, endurance, ROM, flexibility, posture, and core stabilization for 0 minutes including:  UBE/ Bike  7 minutes    Standing Rows  3x10 red band    Prone Ws  3x8                                Cyril received the following manual therapy techniques: Joint mobilizations, Manual traction, Myofacial release, Manual Lymphatic Drainage, Soft tissue Mobilization, and Friction Massage were applied to the: bilateral upper traps for 30 minutes, including:  Soft tissue x Upper traps/ scalenes/ supraspinatus/ QLs     Joint Mobs x Cervical side bend    FDN x Upper traps/ low back region                                 Cyril participated in neuromuscular re-education activities to improve: Balance, Coordination, Kinesthetic, Proprioception, and Posture for 10 minutes. The following activities were included:  Wall [place]      Upper trap stretch      Chin Tucks      Open Books  x 2x10 (B)   Glute bridges  x 30x                  Home Exercises and Patient Education Provided     Education/Self-Care provided: (included in treatment) minutes   Patient educated on the impairments noted above and the effects of physical therapy intervention to improve overall condition and QOL.   Patient was educated on all the above exercise prior/during/after for proper posture, positioning, and execution for safe performance with home exercise program.  Exercise Prescription:   12/21/2022: EVAL- Low      Written Home Exercises Provided: yes. Prefers: Electronic Copies  Exercises were reviewed and Cyril was able to demonstrate them prior to the end of the session.  Cyril demonstrated good understanding of the education provided  See EMR under Patient Instructions for exercises provided during therapy sessions.    ASSESSMENT     Cyril Carlton tolerated PT session well with minimal complaints of pain or discomfort, secondary to poor motor control and decrease muscle endurance. Objective findings are improving with measurements of ROM and functional mobility. Therapy exercises were reviewed by revisiting exercises given from previous home exercise program while adding more dynamic stability exercises. Handouts were not issued during today's visit. Cyirl demonstrated good understanding of new exercises and will continue to progress at home until next follow-up.       Cyril Is progressing well towards his goals.   Pt prognosis is Excellent.     Pt will continue to benefit from skilled outpatient physical therapy to address the deficits listed in the problem  list box on initial evaluation, provide pt/family education and to maximize pt's level of independence in the home and community environment.     Pt's spiritual, cultural and educational needs considered and pt agreeable to plan of care and goals.    Anticipated barriers to physical therapy: occupation    GOALS:  SHORT TERM GOALS: 4 weeks, (01/21/23) 03/22/23   Recent signs and systems trend is improving in order to progress towards LTG's.  PC   Patient will be independent with HEP in order to further progress and return to maximal function.  PC    Pain rating at Worst: 5/10 in order to progress towards increased independence with activity.  PC    Patient will be able to correct postural deviations in sitting and standing, to decrease pain and promote postural awareness for injury prevention.   PC       LONG TERM GOALS: 8  and continuing weeks, (02/21/23) 03/22/23   Patient will return to normal ADL, recreational, and work related activities with less pain and limitation.    PC   Patient will improve AROM to stated goals in order to return to maximal functional potential.    PC   Patient will improve Strength to stated goals of appropriate musculature in order to improve functional independence.   PC    Pain Rating at Best: 1/10 to improve Quality of Life.   PC    Patient will meet predicted functional outcome (FOTO) score: TBA% to increase self-worth & perceived functional ability.  PC    Patient will have met/partially met personal goal of: being able to 20lbs overhead without pain   PC       PC = progressing/continue  PM= partially met  DC= discontinue    PLAN     Continue Plan of Care (POC) and progress per patient tolerance. See Treatment section for exercise progression.    Shayne Brody, PT, DPT

## 2023-05-24 ENCOUNTER — CLINICAL SUPPORT (OUTPATIENT)
Dept: REHABILITATION | Facility: HOSPITAL | Age: 28
End: 2023-05-24
Payer: OTHER GOVERNMENT

## 2023-05-24 DIAGNOSIS — R53.1 DECREASED RANGE OF MOTION WITH DECREASED STRENGTH: ICD-10-CM

## 2023-05-24 DIAGNOSIS — M25.60 DECREASED RANGE OF MOTION WITH DECREASED STRENGTH: ICD-10-CM

## 2023-05-24 DIAGNOSIS — M54.2 CHRONIC NECK AND BACK PAIN: Primary | ICD-10-CM

## 2023-05-24 DIAGNOSIS — M54.9 CHRONIC NECK AND BACK PAIN: Primary | ICD-10-CM

## 2023-05-24 DIAGNOSIS — G89.29 CHRONIC NECK AND BACK PAIN: Primary | ICD-10-CM

## 2023-05-24 PROCEDURE — 97140 MANUAL THERAPY 1/> REGIONS: CPT

## 2023-05-24 PROCEDURE — 97112 NEUROMUSCULAR REEDUCATION: CPT

## 2023-05-24 NOTE — PROGRESS NOTES
[facility]  Physical Therapy Treatment Note      Name: Cyril Carlton  [facility] Number: [id number]    Therapy Diagnosis:   Encounter Diagnoses   Name Primary?    Chronic neck and back pain Yes    Decreased range of motion with decreased strength      Physician: Lon Thomas MD  Visit Date: 5/24/2023  Physician Orders: PT Eval and Treat  Medical Diagnosis from Referral: Neck Pain   Evaluation Date: 12/21/2022  Authorization Period Expiration: 12/31/22  Plan of Care Expiration: 05/21/2023                          Progress Update: 04/20/2023                        FOTO: 0 / 3    Visit # / Visits authorized: 18 / 30 (+4 visits)                          PRECAUTIONS: Standard Precautions      Time In: 1345  Time Out: 1430  Total Billable Time: 40 minutes    SUBJECTIVE     Pt reports: pop in low back Sunday morning. Back pain reduce but low back feels weak. .   Compliance with Hep: Daily  Response to previous treatment: no adverse reactions to treatment/updated HEP  Functional change: No Change    Pain: 2/10   Worst: 7/10  Location: low back  Pain Control: rest and stretching   Aggravating factors: overhead movements     OBJECTIVE     Objective Measures updated at progress report unless specified otherwise.    Treatment     Gym/Equipment Access: increase secondary to verbal cues   Time to Complete Exercises: Increase secondary to verbal/tactile cues     Cyril received therapeutic exercises to develop strength, endurance, ROM, flexibility, posture, and core stabilization for 0 minutes including:  UBE/ Bike  7 minutes    Standing Rows  3x10 red band    Prone Ws  3x8                                Cyril received the following manual therapy techniques: Joint mobilizations, Manual traction, Myofacial release, Manual Lymphatic Drainage, Soft tissue Mobilization, and Friction Massage were applied to the: bilateral upper traps for 30 minutes, including:  Soft tissue x Upper traps/ scalenes/  supraspinatus/ QLs    Joint Mobs x Cervical side bend    FDN x Upper traps/ low back region                                 Cyril participated in neuromuscular re-education activities to improve: Balance, Coordination, Kinesthetic, Proprioception, and Posture for 10 minutes. The following activities were included:  Wall [place]      Upper trap stretch      Chin Tucks      Open Books  x 2x10 (B)   Glute bridges  x 30x                  Home Exercises and Patient Education Provided     Education/Self-Care provided: (included in treatment) minutes   Patient educated on the impairments noted above and the effects of physical therapy intervention to improve overall condition and QOL.   Patient was educated on all the above exercise prior/during/after for proper posture, positioning, and execution for safe performance with home exercise program.  Exercise Prescription:   12/21/2022: EVAL- Low      Written Home Exercises Provided: yes. Prefers: Electronic Copies  Exercises were reviewed and Cyril was able to demonstrate them prior to the end of the session.  Cyril demonstrated good understanding of the education provided  See EMR under Patient Instructions for exercises provided during therapy sessions.    ASSESSMENT     Cyril Carlton tolerated PT session well with minimal complaints of pain or discomfort, secondary to poor motor control and decrease muscle endurance. Objective findings are improving with measurements of ROM and functional mobility. Therapy exercises were reviewed by revisiting exercises given from previous home exercise program while adding more dynamic stability exercises. Handouts were not issued during today's visit. Cyril demonstrated good understanding of new exercises and will continue to progress at home until next follow-up.       Cyril Is progressing well towards his goals.   Pt prognosis is Excellent.     Pt will continue to benefit from skilled outpatient physical therapy to address the deficits  listed in the problem list box on initial evaluation, provide pt/family education and to maximize pt's level of independence in the home and community environment.     Pt's spiritual, cultural and educational needs considered and pt agreeable to plan of care and goals.    Anticipated barriers to physical therapy: occupation    GOALS:  SHORT TERM GOALS: 4 weeks, (01/21/23) 03/22/23   Recent signs and systems trend is improving in order to progress towards LTG's.  PC   Patient will be independent with HEP in order to further progress and return to maximal function.  PC    Pain rating at Worst: 5/10 in order to progress towards increased independence with activity.  PC    Patient will be able to correct postural deviations in sitting and standing, to decrease pain and promote postural awareness for injury prevention.   PC       LONG TERM GOALS: 8  and continuing weeks, (02/21/23) 03/22/23   Patient will return to normal ADL, recreational, and work related activities with less pain and limitation.    PC   Patient will improve AROM to stated goals in order to return to maximal functional potential.    PC   Patient will improve Strength to stated goals of appropriate musculature in order to improve functional independence.   PC    Pain Rating at Best: 1/10 to improve Quality of Life.   PC    Patient will meet predicted functional outcome (FOTO) score: TBA% to increase self-worth & perceived functional ability.  PC    Patient will have met/partially met personal goal of: being able to 20lbs overhead without pain   PC       PC = progressing/continue  PM= partially met  DC= discontinue    PLAN     Continue Plan of Care (POC) and progress per patient tolerance. See Treatment section for exercise progression.    Shayne Brody, PT, DPT

## 2023-05-26 ENCOUNTER — OFFICE VISIT (OUTPATIENT)
Dept: PSYCHIATRY | Facility: CLINIC | Age: 28
End: 2023-05-26
Payer: OTHER GOVERNMENT

## 2023-05-26 DIAGNOSIS — F43.29 STRESS AND ADJUSTMENT REACTION: Primary | ICD-10-CM

## 2023-05-26 DIAGNOSIS — R41.840 DIFFICULTY CONCENTRATING: ICD-10-CM

## 2023-05-26 DIAGNOSIS — F41.9 ANXIETY: ICD-10-CM

## 2023-05-26 PROCEDURE — 90834 PSYTX W PT 45 MINUTES: CPT | Mod: 95,,, | Performed by: SOCIAL WORKER

## 2023-05-26 PROCEDURE — 90834 PR PSYCHOTHERAPY W/PATIENT, 45 MIN: ICD-10-PCS | Mod: 95,,, | Performed by: SOCIAL WORKER

## 2023-05-26 NOTE — PROGRESS NOTES
Individual Psychotherapy Follow-up Visit Progress Note (PhD/LCSW)     Due to the nature of this visit type, a virtual visit with synchronous audio and video, each patient to whom this provider administers behavioral health services by telemedicine is: (1) informed of the relationship between the provider and patient and the respective role of any other health care provider with respect to management of the patient; and (2) notified that he or she may decline to receive services by telemedicine and may withdraw from such care at any time.    The patient was informed of the following:     Provider's contact info:  [facility]  [id number] [place] [address], 3rd Floor, [address]  [place] [address]  (Phone) [phone]    If technology issues occur, call office phone: : [phone]  If crisis: Dial 911 or go to nearest Emergency Room (ER)  If questions related to privacy practices: contact [facility] Information Department: [phone]    For security purposes, the pt identified that they were at [address] [facility] [address]  [place] [address] during today's session and contact number is [phone].    The pt's emergency contact(s) is No emergency contact information on file..    Crisis Disclaimer: Patient was informed that due to the virtual nature of the visit, that if a crisis develops, protocols will be implemented to ensure patient safety, including but not limited to: 1) Initiating a welfare check with local law enforcement and/or 2) Calling 911.    Outpatient Psychotherapy - 45 minutes with patient (38-52 minutes) - [id number]    Date: 06/14/2023    Visit Type: Telehealth        5/26/2023  MRN: [id number]  Primary Care Provider: Lon Thomas MD    Cyril Carlton is a 28 y.o. male who presents today for follow-up of depression and anxiety. Met with patient.      Preferred Name: Cyril   Transgender Identity Form    Gender Identity Form  Transition Summary          Subjective:     Last encounter (with this provider): 3/9/2023     Content of Current Session:  Met with this patient for her online follow-up appointment.  The patient was in her home throughout the appointment.  She stated that she had been having migraines lately but had gotten help from the doctor's office.  She states that she broke up with her young boyfriend and he also was in senior care arrested on [email or web address] with a gun and drugs with him.  She stated that she has made some other female friends as well.  She is trying to be more sociable and develop healthier communication habits.  She stated she would make a follow-up appointment.    Therapeutic Interventions Utilized During Current Session: Acceptance and Commitment Therapy, Cognitive Behavioral Therapy, Mindfulness-Based Cognitive Therapy    No flowsheet data found.   0-4 = Minimal anxiety  5-9 = Mild anxiety  10-14 = Moderate anxiety  15-21 = Severe anxiety     PHQ-9 Depression Patient Health Questionnaire 5/26/2023 3/21/2023 3/2/2023 1/10/2023 12/5/2022   Patient agreed to terms: Yes Yes Yes Yes Yes   Little interest or pleasure in doing things 0 0 0 0 0   Feeling down, depressed, or hopeless 0 0 0 0 0   Trouble falling or staying asleep, or sleeping too much 0 0 0 0 1   Feeling tired or having little energy 0 0 0 0 0   Poor appetite or overeating 0 0 0 0 0   Feeling bad about yourself - or that you are a failure or have let yourself or your family down 0 0 0 0 0   Trouble concentrating on things, such as reading the newspaper or watching television 0 0 0 1 0   Moving or speaking so slowly that other people could have noticed. Or the opposite - being so fidgety or restless that you have been moving around a lot more than usual 0 0 0 0 0   Thoughts that you would be better off dead, or of hurting yourself in some way 0 0 0 0 0   PHQ-9 Total Score 0 0 0 1 1   If you checked off any problems, how difficult have these problems made it for you to do your  work, take care of things at home, or get along with other people? Not difficult at all Not difficult at all Not difficult at all Not difficult at all Not difficult at all   Interpretation Minimal or None Minimal or None Minimal or None Minimal or None Minimal or None     0-4 = No intervention  5 to 9 = Mild  10 to 14 = Moderate  15 to 19 = Moderately severe  ?20 = Severe      Objective:       Mental Status Evaluation  Appearance: unremarkable, age appropriate  Behavior: normal, cooperative  Speech: normal tone, normal rate, normal pitch, normal volume  Mood: steady  Affect: congruent and appropriate  Thought Process: normal and logical  Thought Content: normal, no suicidality, no homicidality, delusions, or paranoia  Sensorium: grossly intact  Cognition: grossly intact  Insight: fair  Judgment: adequate to circumstances    Risk parameters:  Patient reports no suicidal ideation  Patient reports no homicidal ideation  Patient reports no self-injurious behavior  Patient reports no violent behavior      Assessment & Plan:     The patient's response to the interventions is accepting    The patient's progress toward treatment goals is excellent    Homework assigned: attend a support group     Treatment plan:   A. Target symptoms: Depression, Anxiety, Adjustment Disorder, and Poor Coping Skills   B. Therapeutic modalities: behavior modifying psychotherapy  C. Why chosen therapy is appropriate versus another modality: patient responds to this modality   D. Outcome monitoring methods: self report, observation, rating scales, feedback from clinical staff      Visit Diagnosis:   1. Stress and adjustment reaction    2. Difficulty concentrating    3. Anxiety        Follow-up: individual psychotherapy    Return to Clinic: as scheduled  Pt Reported to Schedule Self via Epic EMR MyChart Application and/or Department Support Staff

## 2023-05-30 ENCOUNTER — OFFICE VISIT (OUTPATIENT)
Dept: INTERNAL MEDICINE | Facility: CLINIC | Age: 28
End: 2023-05-30
Payer: OTHER GOVERNMENT

## 2023-05-30 VITALS
HEART RATE: 78 BPM | HEIGHT: 64 IN | BODY MASS INDEX: 32.52 KG/M2 | OXYGEN SATURATION: 98 % | SYSTOLIC BLOOD PRESSURE: 120 MMHG | DIASTOLIC BLOOD PRESSURE: 84 MMHG | TEMPERATURE: 98 F | WEIGHT: 190.5 LBS

## 2023-05-30 DIAGNOSIS — R21 RASH: ICD-10-CM

## 2023-05-30 DIAGNOSIS — M25.512 ACUTE PAIN OF LEFT SHOULDER: Primary | ICD-10-CM

## 2023-05-30 PROCEDURE — 99213 OFFICE O/P EST LOW 20 MIN: CPT | Mod: S$PBB,,, | Performed by: FAMILY MEDICINE

## 2023-05-30 PROCEDURE — 99213 OFFICE O/P EST LOW 20 MIN: CPT | Mod: PBBFAC | Performed by: FAMILY MEDICINE

## 2023-05-30 PROCEDURE — 99999 PR PBB SHADOW E&M-EST. PATIENT-LVL III: ICD-10-PCS | Mod: PBBFAC,,, | Performed by: FAMILY MEDICINE

## 2023-05-30 PROCEDURE — 99213 PR OFFICE/OUTPT VISIT, EST, LEVL III, 20-29 MIN: ICD-10-PCS | Mod: S$PBB,,, | Performed by: FAMILY MEDICINE

## 2023-05-30 PROCEDURE — 99999 PR PBB SHADOW E&M-EST. PATIENT-LVL III: CPT | Mod: PBBFAC,,, | Performed by: FAMILY MEDICINE

## 2023-05-30 RX ORDER — MELOXICAM 15 MG/1
15 TABLET ORAL DAILY PRN
Qty: 30 TABLET | Refills: 0 | Status: SHIPPED | OUTPATIENT
Start: 2023-05-30 | End: 2023-08-02

## 2023-05-30 RX ORDER — TRIAMCINOLONE ACETONIDE 1 MG/G
CREAM TOPICAL 2 TIMES DAILY
Qty: 15 G | Refills: 0 | Status: SHIPPED | OUTPATIENT
Start: 2023-05-30 | End: 2023-08-02

## 2023-05-30 NOTE — PROGRESS NOTES
Subjective:      Patient ID: Cyril Carlton is a 28 y.o. male.    Chief Complaint: Shoulder Pain    HPI Friday after completing upper body workout began having left shoulder pain with certain ranges of motion at times very difficult to raise his arm improved since then but occasionally will happens sudden movements to pick something up or move something unless night I believe he had some numbness in his arm which resolved upon getting up; no weakness    One month of non enlarging rash right inner elbow area sometimes itches no medications tried  Past Medical History:   Diagnosis Date    Patient denies medical problems       Past Surgical History:   Procedure Laterality Date    NO PAST SURGERIES        Social History     Socioeconomic History    Marital status: Single   Tobacco Use    Smoking status: Never    Smokeless tobacco: Never   Substance and Sexual Activity    Alcohol use: Yes     Alcohol/week: 2.0 standard drinks     Types: 2 Cans of beer per week    Drug use: Never    Sexual activity: Yes     Partners: Female      Family History   Problem Relation Age of Onset    No Known Problems Mother     No Known Problems Father     No Known Problems Sister     Diabetes Maternal Grandmother     Diabetes Paternal Grandmother     Diabetes Paternal Grandfather       Review of Systems        Objective:     Physical Exam general no distress     Left shoulder full range of motion without any reproduced pain no tenderness palpation no signs of atrophy no rotator cuff weakness or pain negative impingement test and could not reproduce the pain during exam except when he was putting his jacket on motor 5/5 upper extremities     Right antecubital area with 2 oblong 1-2 cm plaques  Assessment:         ICD-10-CM ICD-9-CM   1. Acute pain of left shoulder  M25.512 719.41   2. Rash  R21 782.1      Plan:        1. Acute pain of left shoulder    2. Rash    Other orders  -     triamcinolone acetonide 0.1% (KENALOG) 0.1 % cream; Apply  topically 2 (two) times daily. for 7 days  Dispense: 15 g; Refill: 0  -     meloxicam (MOBIC) 15 MG tablet; Take 1 tablet (15 mg total) by mouth daily as needed for Pain.  Dispense: 30 tablet; Refill: 0     Steroid cream one-week does not resolve comes back then dermatology    He sees a physical therapist tomorrow for his routine visits related to chronic neck and back pain and plans to discuss his shoulder pain.  I recommend he avoid working on his shoulder least a couple weeks pain resolved for any worsening change notify change occasional p.r.n. ibuprofen use to meloxicam daily for next 3 days then daily need as needed

## 2023-05-31 ENCOUNTER — CLINICAL SUPPORT (OUTPATIENT)
Dept: REHABILITATION | Facility: HOSPITAL | Age: 28
End: 2023-05-31
Payer: OTHER GOVERNMENT

## 2023-05-31 DIAGNOSIS — M25.60 DECREASED RANGE OF MOTION WITH DECREASED STRENGTH: ICD-10-CM

## 2023-05-31 DIAGNOSIS — G89.29 CHRONIC NECK AND BACK PAIN: Primary | ICD-10-CM

## 2023-05-31 DIAGNOSIS — M54.9 CHRONIC NECK AND BACK PAIN: Primary | ICD-10-CM

## 2023-05-31 DIAGNOSIS — R53.1 DECREASED RANGE OF MOTION WITH DECREASED STRENGTH: ICD-10-CM

## 2023-05-31 DIAGNOSIS — M54.2 CHRONIC NECK AND BACK PAIN: Primary | ICD-10-CM

## 2023-05-31 PROCEDURE — 97110 THERAPEUTIC EXERCISES: CPT

## 2023-05-31 PROCEDURE — 97140 MANUAL THERAPY 1/> REGIONS: CPT

## 2023-05-31 NOTE — PROGRESS NOTES
[facility]  Physical Therapy Treatment Note      Name: Cyril Carlton  [facility] Number: [id number]    Therapy Diagnosis:   Encounter Diagnoses   Name Primary?    Chronic neck and back pain Yes    Decreased range of motion with decreased strength      Physician: Lon Thomas MD  Visit Date: 5/31/2023  Physician Orders: PT Eval and Treat  Medical Diagnosis from Referral: Neck Pain   Evaluation Date: 12/21/2022  Authorization Period Expiration: 12/31/22  Plan of Care Expiration: 05/21/2023                          Progress Update: 04/20/2023                        FOTO: 0 / 3    Visit # / Visits authorized: 19 / 30 (+4 visits)                          PRECAUTIONS: Standard Precautions      Time In: 1300  Time Out: 1440  Total Billable Time: 40 minutes    SUBJECTIVE     Pt reports: increase discomfort in left shoulder and low back region secondary to straining at the gym.   Compliance with Hep: Daily  Response to previous treatment: no adverse reactions to treatment/updated HEP  Functional change: No Change    Pain: 2/10   Worst: 7/10  Location: low back  Pain Control: rest and stretching   Aggravating factors: overhead movements     OBJECTIVE     Objective Measures updated at progress report unless specified otherwise.    Treatment     Gym/Equipment Access: increase secondary to verbal cues   Time to Complete Exercises: Increase secondary to verbal/tactile cues     [place] received therapeutic exercises to develop strength, endurance, ROM, flexibility, posture, and core stabilization for 10 minutes including:  UBE/ Bike x 7 minutes    Standing Rows x 3x10 red band    Prone Ws  3x8                                Cyril received the following manual therapy techniques: Joint mobilizations, Manual traction, Myofacial release, Manual Lymphatic Drainage, Soft tissue Mobilization, and Friction Massage were applied to the: bilateral upper traps for 30 minutes, including:  Soft tissue x Upper traps/  scalenes/ supraspinatus/ QLs    Joint Mobs x Cervical side bend    FDN x Upper traps/ low back region                                 Cyril participated in neuromuscular re-education activities to improve: Balance, Coordination, Kinesthetic, Proprioception, and Posture for 0 minutes. The following activities were included:  Wall [place]      Upper trap stretch      Chin Tucks      Open Books   2x10 (B)   Glute bridges   30x                  Home Exercises and Patient Education Provided     Education/Self-Care provided: (included in treatment) minutes   Patient educated on the impairments noted above and the effects of physical therapy intervention to improve overall condition and QOL.   Patient was educated on all the above exercise prior/during/after for proper posture, positioning, and execution for safe performance with home exercise program.  Exercise Prescription:   12/21/2022: EVAL- Low      Written Home Exercises Provided: yes. Prefers: Electronic Copies  Exercises were reviewed and Cyril was able to demonstrate them prior to the end of the session.  Cyril demonstrated good understanding of the education provided  See EMR under Patient Instructions for exercises provided during therapy sessions.    ASSESSMENT     Cyril Carlton tolerated PT session well with minimal complaints of pain or discomfort, secondary to poor motor control and decrease muscle endurance. Objective findings are improving with measurements of ROM and functional mobility. Therapy exercises were reviewed by revisiting exercises given from previous home exercise program while adding more dynamic stability exercises. Handouts were not issued during today's visit. Cyril demonstrated good understanding of new exercises and will continue to progress at home until next follow-up.       Cyril Is progressing well towards his goals.   Pt prognosis is Excellent.     Pt will continue to benefit from skilled outpatient physical therapy to address the  deficits listed in the problem list box on initial evaluation, provide pt/family education and to maximize pt's level of independence in the home and community environment.     Pt's spiritual, cultural and educational needs considered and pt agreeable to plan of care and goals.    Anticipated barriers to physical therapy: occupation    GOALS:  SHORT TERM GOALS: 4 weeks, (01/21/23) 03/22/23   Recent signs and systems trend is improving in order to progress towards LTG's.  PC   Patient will be independent with HEP in order to further progress and return to maximal function.  PC    Pain rating at Worst: 5/10 in order to progress towards increased independence with activity.  PC    Patient will be able to correct postural deviations in sitting and standing, to decrease pain and promote postural awareness for injury prevention.   PC       LONG TERM GOALS: 8  and continuing weeks, (02/21/23) 03/22/23   Patient will return to normal ADL, recreational, and work related activities with less pain and limitation.    PC   Patient will improve AROM to stated goals in order to return to maximal functional potential.    PC   Patient will improve Strength to stated goals of appropriate musculature in order to improve functional independence.   PC    Pain Rating at Best: 1/10 to improve Quality of Life.   PC    Patient will meet predicted functional outcome (FOTO) score: TBA% to increase self-worth & perceived functional ability.  PC    Patient will have met/partially met personal goal of: being able to 20lbs overhead without pain   PC       PC = progressing/continue  PM= partially met  DC= discontinue    PLAN     Continue Plan of Care (POC) and progress per patient tolerance. See Treatment section for exercise progression.    Shayne Brody, PT, DPT

## 2023-06-05 ENCOUNTER — OFFICE VISIT (OUTPATIENT)
Dept: PHYSICAL MEDICINE AND REHAB | Facility: CLINIC | Age: 28
End: 2023-06-05
Payer: OTHER GOVERNMENT

## 2023-06-05 VITALS
DIASTOLIC BLOOD PRESSURE: 91 MMHG | HEIGHT: 64 IN | WEIGHT: 190 LBS | BODY MASS INDEX: 32.44 KG/M2 | RESPIRATION RATE: 14 BRPM | SYSTOLIC BLOOD PRESSURE: 130 MMHG | HEART RATE: 76 BPM

## 2023-06-05 DIAGNOSIS — M79.18 CERVICAL MYOFASCIAL PAIN SYNDROME: Primary | ICD-10-CM

## 2023-06-05 PROCEDURE — 99214 PR OFFICE/OUTPT VISIT, EST, LEVL IV, 30-39 MIN: ICD-10-PCS | Mod: S$PBB,,, | Performed by: PHYSICAL MEDICINE & REHABILITATION

## 2023-06-05 PROCEDURE — 99999 PR PBB SHADOW E&M-EST. PATIENT-LVL IV: CPT | Mod: PBBFAC,,, | Performed by: PHYSICAL MEDICINE & REHABILITATION

## 2023-06-05 PROCEDURE — 99214 OFFICE O/P EST MOD 30 MIN: CPT | Mod: S$PBB,,, | Performed by: PHYSICAL MEDICINE & REHABILITATION

## 2023-06-05 PROCEDURE — 99999 PR PBB SHADOW E&M-EST. PATIENT-LVL IV: ICD-10-PCS | Mod: PBBFAC,,, | Performed by: PHYSICAL MEDICINE & REHABILITATION

## 2023-06-05 PROCEDURE — 99214 OFFICE O/P EST MOD 30 MIN: CPT | Mod: PBBFAC | Performed by: PHYSICAL MEDICINE & REHABILITATION

## 2023-06-05 NOTE — PROGRESS NOTES
PM&R CLINIC NOTE  Chief Complaint   Patient presents with    Back Pain     HPI: This is a 28 y.o.  male being seen in clinic today for re-evaluation of chronic low back achy pain and tightness.  He reports increased pain during recent workout that has gradually improved. He also feels tightness and achy pain at his left trapezius/shoulder    History obtained from patient    Functional History:  Walking: Not limited  Transfers: Independent  Assistive devices: No  Power mobility: No  Falls: None   Directional preference:ext  Employment status:    Needs help with:  Nothing - all ADLS normal    Past family, medical, social, and surgical history reviewed in chart    Review of Systems:     General- denies lethargy, weight change, fever, chills  Head/neck- denies swallowing difficulties  ENT- denies hearing changes  Cardiovascular-denies chest pain  Pulmonary- denies shortness of breath  GI- denies constipation or bowel incontinence  - denies bladder incontinence  Skin- denies wounds or rashes  Musculoskeletal- denies weakness, +pain  Neurologic- denies numbness and tingling  Psychiatric- denies depressive or psychotic features, denies anxiety  Lymphatic-denies swelling  Endocrine- denies hypoglycemic symptoms/DM history  All other pertinent systems negative     Physical Examination:  General: Well developed, well nourished male, NAD  HEENT:NCAT EOMI bilaterally   Pulmonary:Normal respirations    Spinal Examination: CERVICAL  Active ROM is within normal limits.  Inspection: No deformity of spinal alignment.    Spinal Examination: LUMBAR or THORACIC  Active ROM is within normal limits, painful at full forward flexion  Inspection: No deformity of spinal alignment.  No palpable olisthesis.  Palpation: No vertebral tenderness to percussion.  Not ttp at si joints, tight paraspinals  BOY: negative  SLR Test (seated):negative , tight hips    Bilateral Upper and Lower Extremities:  Pulses are 2+ at radial,  bilaterally.  Shoulder/Elbow/Wrist/Hand ROM wnl  Hip/Knee/Ankle ROM wnl  Bilateral Extremities show normal capillary refill.  No signs of cyanosis, rubor, edema, skin changes, or dysvascular changes of appendages.  Nails appear intact.    Neurological Exam:  Cranial Nerves:  II-XII grossly intact    Manual Muscle Testing: (Motor 5=normal)  5/5 strength bilateral upper ext  RIGHT Lower extremity: Hip flexion 5/5, Hip Abduction 5/5, Hip Adduction 5/5, Knee extension 5/5, Knee flexion 5/5, Ankle dorsiflexion 5/5, Extensor hallucis longus 5/5, Ankle plantarflexion 5/5  LEFT Lower extremity:  Hip flexion 5/5, Hip Abduction 5/5,Hip Adduction 5/5, Knee extension 5/5, Knee flexion 5/5, Ankle dorsiflexion 5/5, Extensor hallucis longus 5/5, Ankle plantarflexion 5/5    No focal atrophy is noted of either upper or lower extremity.    Bilateral Reflexes:  Hoffmans neg bilaterally  No clonus at knee or ankle.    Sensation: tested to light touch  - intact in legs and arms  Gait: Narrow base and good arm swing.      IMPRESSION/PLAN: This is a 28 y.o.  male with sacroiliac pain, probable lumbar DDD/discogenic pain, myofascial pain  Discussed in detail for 30 minutes about diagnosis and treatment plan    1. Update PT order to address left cervical myofascial issues --Shayne-rotator cuff strengthening/scapular stabilization, dry needle, posture, stretch, ROM, modalities   2. Cont HEP-cont focus on hip and core exercise  3. Cont Mag, potassium. Turmeric, cinnamon, garlic  4. Handouts on stretch/exercises provides  5. If not improving, will get MRI and refer to LINDA Bustillo M.D.  Physical Medicine and Rehab

## 2023-06-07 ENCOUNTER — CLINICAL SUPPORT (OUTPATIENT)
Dept: REHABILITATION | Facility: HOSPITAL | Age: 28
End: 2023-06-07
Payer: OTHER GOVERNMENT

## 2023-06-07 DIAGNOSIS — M54.2 CHRONIC NECK AND BACK PAIN: Primary | ICD-10-CM

## 2023-06-07 DIAGNOSIS — M79.18 CERVICAL MYOFASCIAL PAIN SYNDROME: ICD-10-CM

## 2023-06-07 DIAGNOSIS — R53.1 DECREASED RANGE OF MOTION WITH DECREASED STRENGTH: ICD-10-CM

## 2023-06-07 DIAGNOSIS — G89.29 CHRONIC NECK AND BACK PAIN: Primary | ICD-10-CM

## 2023-06-07 DIAGNOSIS — M54.9 CHRONIC NECK AND BACK PAIN: Primary | ICD-10-CM

## 2023-06-07 DIAGNOSIS — M25.60 DECREASED RANGE OF MOTION WITH DECREASED STRENGTH: ICD-10-CM

## 2023-06-07 PROCEDURE — 97110 THERAPEUTIC EXERCISES: CPT

## 2023-06-07 PROCEDURE — 97140 MANUAL THERAPY 1/> REGIONS: CPT

## 2023-06-07 PROCEDURE — 97112 NEUROMUSCULAR REEDUCATION: CPT

## 2023-06-07 NOTE — PROGRESS NOTES
[facility]  Physical Therapy Treatment Note      Name: Cyril Carlton  [facility] Number: [id number]    Therapy Diagnosis:   Encounter Diagnoses   Name Primary?    Cervical myofascial pain syndrome     Chronic neck and back pain Yes    Decreased range of motion with decreased strength      Physician: Lon Thomas MD  Visit Date: 6/7/2023  Physician Orders: PT Eval and Treat  Medical Diagnosis from Referral: Neck Pain   Evaluation Date: 12/21/2022  Authorization Period Expiration: 12/31/22  Plan of Care Expiration: 05/21/2023                          Progress Update: 04/20/2023                        FOTO: 0 / 3    Visit # / Visits authorized: 20 / 30 (+4 visits)                          PRECAUTIONS: Standard Precautions      Time In: 1300  Time Out: 1440  Total Billable Time: 40 minutes    SUBJECTIVE     Pt reports: increase discomfort in left shoulder and low back region secondary to straining at the gym.   Compliance with Hep: Daily  Response to previous treatment: no adverse reactions to treatment/updated HEP  Functional change: No Change    Pain: 2/10   Worst: 7/10  Location: low back  Pain Control: rest and stretching   Aggravating factors: overhead movements     OBJECTIVE     Objective Measures updated at progress report unless specified otherwise.    Treatment     Gym/Equipment Access: increase secondary to verbal cues   Time to Complete Exercises: Increase secondary to verbal/tactile cues     Cyril received therapeutic exercises to develop strength, endurance, ROM, flexibility, posture, and core stabilization for 10 minutes including:  UBE/ Bike x 7 minutes    Standing Rows x 3x10 7.5 lb cable    Prone Ws  3x8                                Cyril received the following manual therapy techniques: Joint mobilizations, Manual traction, Myofacial release, Manual Lymphatic Drainage, Soft tissue Mobilization, and Friction Massage were applied to the: bilateral upper traps for 15  minutes, including:  Soft tissue x Upper traps/ scalenes/ supraspinatus/ QLs    Joint Mobs x Cervical side bend    FDN x Upper traps/ infraspinatus (B)                                 Cyril participated in neuromuscular re-education activities to improve: Balance, Coordination, Kinesthetic, Proprioception, and Posture for 15 minutes. The following activities were included:  Wall [place]      Upper trap stretch      Chin Tucks      Open Books   2x10 (B)   Glute bridges   30x   Bosu ball push ups x 5x5   Rui press x 3x8 5lbs cable machine    Standing Swimmers x 3x8 (B)   Home Exercises and Patient Education Provided     Education/Self-Care provided: (included in treatment) minutes   Patient educated on the impairments noted above and the effects of physical therapy intervention to improve overall condition and QOL.   Patient was educated on all the above exercise prior/during/after for proper posture, positioning, and execution for safe performance with home exercise program.  Exercise Prescription:   12/21/2022: EVAL- Low      Written Home Exercises Provided: yes. Prefers: Electronic Copies  Exercises were reviewed and Cyril was able to demonstrate them prior to the end of the session.  Cyril demonstrated good understanding of the education provided  See EMR under Patient Instructions for exercises provided during therapy sessions.    ASSESSMENT     Cyril Carlton tolerated PT session well with minimal complaints of pain or discomfort, secondary to poor motor control and decrease muscle endurance. Objective findings are improving with measurements of ROM and functional mobility. Therapy exercises were reviewed by revisiting exercises given from previous home exercise program while adding more dynamic stability exercises. Handouts were not issued during today's visit. Cyril demonstrated good understanding of new exercises and will continue to progress at home until next follow-up.       Cyril Is progressing well towards  his goals.   Pt prognosis is Excellent.     Pt will continue to benefit from skilled outpatient physical therapy to address the deficits listed in the problem list box on initial evaluation, provide pt/family education and to maximize pt's level of independence in the home and community environment.     Pt's spiritual, cultural and educational needs considered and pt agreeable to plan of care and goals.    Anticipated barriers to physical therapy: occupation    GOALS:  SHORT TERM GOALS: 4 weeks, (01/21/23) 03/22/23   Recent signs and systems trend is improving in order to progress towards LTG's.  PC   Patient will be independent with HEP in order to further progress and return to maximal function.  PC    Pain rating at Worst: 5/10 in order to progress towards increased independence with activity.  PC    Patient will be able to correct postural deviations in sitting and standing, to decrease pain and promote postural awareness for injury prevention.   PC       LONG TERM GOALS: 8  and continuing weeks, (02/21/23) 03/22/23   Patient will return to normal ADL, recreational, and work related activities with less pain and limitation.    PC   Patient will improve AROM to stated goals in order to return to maximal functional potential.    PC   Patient will improve Strength to stated goals of appropriate musculature in order to improve functional independence.   PC    Pain Rating at Best: 1/10 to improve Quality of Life.   PC    Patient will meet predicted functional outcome (FOTO) score: TBA% to increase self-worth & perceived functional ability.  PC    Patient will have met/partially met personal goal of: being able to 20lbs overhead without pain   PC       PC = progressing/continue  PM= partially met  DC= discontinue    PLAN     Continue Plan of Care (POC) and progress per patient tolerance. See Treatment section for exercise progression.    Shayne Brody, PT, DPT

## 2023-06-10 ENCOUNTER — PATIENT MESSAGE (OUTPATIENT)
Dept: PHYSICAL MEDICINE AND REHAB | Facility: CLINIC | Age: 28
End: 2023-06-10
Payer: OTHER GOVERNMENT

## 2023-06-13 ENCOUNTER — CLINICAL SUPPORT (OUTPATIENT)
Dept: REHABILITATION | Facility: HOSPITAL | Age: 28
End: 2023-06-13
Payer: OTHER GOVERNMENT

## 2023-06-13 DIAGNOSIS — M54.2 CHRONIC NECK AND BACK PAIN: Primary | ICD-10-CM

## 2023-06-13 DIAGNOSIS — R53.1 DECREASED RANGE OF MOTION WITH DECREASED STRENGTH: ICD-10-CM

## 2023-06-13 DIAGNOSIS — M25.60 DECREASED RANGE OF MOTION WITH DECREASED STRENGTH: ICD-10-CM

## 2023-06-13 DIAGNOSIS — M54.9 CHRONIC NECK AND BACK PAIN: Primary | ICD-10-CM

## 2023-06-13 DIAGNOSIS — G89.29 CHRONIC NECK AND BACK PAIN: Primary | ICD-10-CM

## 2023-06-13 PROCEDURE — 97112 NEUROMUSCULAR REEDUCATION: CPT

## 2023-06-13 PROCEDURE — 97110 THERAPEUTIC EXERCISES: CPT

## 2023-06-13 PROCEDURE — 97140 MANUAL THERAPY 1/> REGIONS: CPT

## 2023-06-14 NOTE — PROGRESS NOTES
[facility]ERAP[facility] AND [facility]LLNESS  Physical Therapy Treatment Note + Updated Plan of Care      Name: Cyril Carlton  Clinic Number: [id number]    Therapy Diagnosis:   Encounter Diagnoses   Name Primary?    Chronic neck and back pain Yes    Decreased range of motion with decreased strength      Physician: Lon Thomas MD  Visit Date: 6/13/2023  Physician Orders: PT Eval and Treat  Medical Diagnosis from Referral: Neck Pain   Evaluation Date: 12/21/2022  Authorization Period Expiration: 12/31/22  Plan of Care Expiration: 07/13/2023                          Progress Update: 06/13/2023                        FOTO: 0 / 3    Visit # / Visits authorized: 21 / 30 (+4 visits)                          PRECAUTIONS: Standard Precautions      Time In: 1300  Time Out: 1340  Total Billable Time: 40 minutes    SUBJECTIVE     Pt reports: increase discomfort in left shoulder and low back region secondary to straining at the gym.   Compliance with Hep: Daily  Response to previous treatment: no adverse reactions to treatment/updated HEP  Functional change: No Change    Pain: 2/10   Worst: 7/10  Location: low back  Pain Control: rest and stretching   Aggravating factors: overhead movements     OBJECTIVE     Objective Measures updated at progress report unless specified otherwise.     FUNCTIONAL SCREEN:     Upper Extremity ROM Details STRENGTH Details   Shoulder WNL  discomfort Grossly 4+/5 Pain with abduction     Elbow WNL No Pain  Grossly 5/5     Hand/Wrist WNL No pain  Grossly 5/5        Gait Analysis: The patient ambulated with the following assistive device: none; the pt presents with the following gait abnormalities: decreased step length, jessica, and arm swing;      Movement Analysis:   Functional Test  Outcome   Double Leg Squat Increase forward trunk lean    Single Leg Step Down Trendelenburg sign bilaterally; pain limited patient's ability to perform activity       Treatment     Gym/Equipment Access: increase  secondary to verbal cues   Time to Complete Exercises: Increase secondary to verbal/tactile cues     Cyril received therapeutic exercises to develop strength, endurance, ROM, flexibility, posture, and core stabilization for 10 minutes including:  UBE/ Bike x 7 minutes    Standing Rows x 3x10 7.5 lb cable    Prone Ws x 3x8                                Cyril received the following manual therapy techniques: Joint mobilizations, Manual traction, Myofacial release, Manual Lymphatic Drainage, Soft tissue Mobilization, and Friction Massage were applied to the: bilateral upper traps for 15 minutes, including:  Soft tissue x Upper traps/ scalenes/ supraspinatus/ QLs    Joint Mobs x Cervical side bend    FDN x Upper traps/ infraspinatus (B)                                 Cyril participated in neuromuscular re-education activities to improve: Balance, Coordination, Kinesthetic, Proprioception, and Posture for 15 minutes. The following activities were included:  Wall [place]  x    Upper trap stretch      Chin Tucks  x    Open Books  x 2x10 (B)   Glute bridges   30x   Bosu ball push ups  5x5   [place] press x 3x8 5lbs cable machine    Standing Swimmers  3x8 (B)   Home Exercises and Patient Education Provided     Education/Self-Care provided: (included in treatment) minutes   Patient educated on the impairments noted above and the effects of physical therapy intervention to improve overall condition and QOL.   Patient was educated on all the above exercise prior/during/after for proper posture, positioning, and execution for safe performance with home exercise program.  Exercise Prescription:   12/21/2022: EVAL- Low      Written Home Exercises Provided: yes. Prefers: Electronic Copies  Exercises were reviewed and Cyril was able to demonstrate them prior to the end of the session.  Cyril demonstrated good understanding of the education provided  See EMR under Patient Instructions for exercises provided during therapy  sessions.    ASSESSMENT     Cyril Carlton tolerated PT session well with minimal complaints of pain or discomfort, secondary to poor motor control and decrease muscle endurance. Objective findings are improving with measurements of ROM and functional mobility. Therapy exercises were reviewed by revisiting exercises given from previous home exercise program while adding more dynamic stability exercises. Handouts were not issued during today's visit. Cyril demonstrated good understanding of new exercises and will continue to progress at home until next follow-up.       Cyril Is progressing well towards his goals.   Pt prognosis is Excellent.     Pt will continue to benefit from skilled outpatient physical therapy to address the deficits listed in the problem list box on initial evaluation, provide pt/family education and to maximize pt's level of independence in the home and community environment.     Pt's spiritual, cultural and educational needs considered and pt agreeable to plan of care and goals.    Anticipated barriers to physical therapy: occupation    GOALS:  SHORT TERM GOALS: 4 weeks, (01/21/23) 03/22/23   Recent signs and systems trend is improving in order to progress towards LTG's.  PC   Patient will be independent with HEP in order to further progress and return to maximal function.  PC    Pain rating at Worst: 5/10 in order to progress towards increased independence with activity.  PC    Patient will be able to correct postural deviations in sitting and standing, to decrease pain and promote postural awareness for injury prevention.   PC       LONG TERM GOALS: 8  and continuing weeks, (02/21/23) 03/22/23   Patient will return to normal ADL, recreational, and work related activities with less pain and limitation.    PC   Patient will improve AROM to stated goals in order to return to maximal functional potential.    PC   Patient will improve Strength to stated goals of appropriate musculature in order to  improve functional independence.   PC    Pain Rating at Best: 1/10 to improve Quality of Life.   PC    Patient will meet predicted functional outcome (FOTO) score: TBA% to increase self-worth & perceived functional ability.  PC    Patient will have met/partially met personal goal of: being able to 20lbs overhead without pain   PC       PC = progressing/continue  PM= partially met  DC= discontinue    PLAN     Continue Plan of Care (POC) and progress per patient tolerance. See Treatment section for exercise progression.    Shayne Brody, PT, DPT

## 2023-06-14 NOTE — PLAN OF CARE
[facility]  Physical Therapy Plan of Care Note     Name: Cyril Carlton  [facility] Number: [id number]    Therapy Diagnosis:   Encounter Diagnoses   Name Primary?    Chronic neck and back pain Yes    Decreased range of motion with decreased strength      Physician: Lon Thomas MD    Visit Date: 6/13/2023  Physician Orders: PT Eval and Treat  Medical Diagnosis from Referral: Neck Pain   Evaluation Date: 12/21/2022  Authorization Period Expiration: 12/31/22  Plan of Care Expiration: 07/13/2023                          Progress Update: 06/13/2023                        FOTO: 0 / 3    Visit # / Visits authorized: 21 / 30 (+4 visits)                          PRECAUTIONS: Standard Precautions       Precautions: Standard  Functional Level Prior to Evaluation:  Independent     Subjective     Update: See treatment note     Objective      Update: See treatment note     Assessment     Update: See treatment note   Long Term Goal Status: continue per initial plan of care.  Reasons for Recertification of Therapy:   Continue to have pain with movement    GOALS:  SHORT TERM GOALS: 4 weeks, (01/21/23) 03/22/23   Recent signs and systems trend is improving in order to progress towards LTG's.  PC   Patient will be independent with HEP in order to further progress and return to maximal function.  PC    Pain rating at Worst: 5/10 in order to progress towards increased independence with activity.  PC    Patient will be able to correct postural deviations in sitting and standing, to decrease pain and promote postural awareness for injury prevention.   PC       LONG TERM GOALS: 8  and continuing weeks, (02/21/23) 03/22/23   Patient will return to normal ADL, recreational, and work related activities with less pain and limitation.    PC   Patient will improve AROM to stated goals in order to return to maximal functional potential.    PC   Patient will improve Strength to stated goals of appropriate musculature in  order to improve functional independence.   PC    Pain Rating at Best: 1/10 to improve Quality of Life.   PC    Patient will meet predicted functional outcome (FOTO) score: TBA% to increase self-worth & perceived functional ability.  PC    Patient will have met/partially met personal goal of: being able to 20lbs overhead without pain         Plan     Updated Certification Period: 06/13/23 to 07/13/23   Recommended Treatment Plan: 1 times per week for 4 weeks:  Gait Training, Manual Therapy, Moist Heat/ Ice, Neuromuscular Re-ed, Patient Education, Self Care, Therapeutic Activities, and Therapeutic Exercise  Other Recommendations:     Shayne Brody, PT DPT

## 2023-06-30 ENCOUNTER — CLINICAL SUPPORT (OUTPATIENT)
Dept: REHABILITATION | Facility: HOSPITAL | Age: 28
End: 2023-06-30
Payer: OTHER GOVERNMENT

## 2023-06-30 DIAGNOSIS — M54.2 CHRONIC NECK AND BACK PAIN: Primary | ICD-10-CM

## 2023-06-30 DIAGNOSIS — G89.29 CHRONIC NECK AND BACK PAIN: Primary | ICD-10-CM

## 2023-06-30 DIAGNOSIS — M54.9 CHRONIC NECK AND BACK PAIN: Primary | ICD-10-CM

## 2023-06-30 DIAGNOSIS — M25.60 DECREASED RANGE OF MOTION WITH DECREASED STRENGTH: ICD-10-CM

## 2023-06-30 DIAGNOSIS — R53.1 DECREASED RANGE OF MOTION WITH DECREASED STRENGTH: ICD-10-CM

## 2023-06-30 PROCEDURE — 97140 MANUAL THERAPY 1/> REGIONS: CPT

## 2023-06-30 PROCEDURE — 97112 NEUROMUSCULAR REEDUCATION: CPT

## 2023-06-30 PROCEDURE — 97110 THERAPEUTIC EXERCISES: CPT

## 2023-06-30 NOTE — PROGRESS NOTES
[facility]  Physical Therapy Treatment Note + Updated Plan of Care      Name: Cyril Carlton  Clinic Number: [id number]    Therapy Diagnosis:   Encounter Diagnoses   Name Primary?    Chronic neck and back pain Yes    Decreased range of motion with decreased strength      Physician: Lon Thomas MD  Visit Date: 6/30/2023  Physician Orders: PT Eval and Treat  Medical Diagnosis from Referral: Neck Pain   Evaluation Date: 12/21/2022  Authorization Period Expiration: 12/31/22  Plan of Care Expiration: 07/13/2023                          Progress Update: 06/13/2023                        FOTO: 0 / 3    Visit # / Visits authorized: 22 / 30 (+4 visits)                          PRECAUTIONS: Standard Precautions      Time In: 1145  Time Out: 1230  Total Billable Time: 40 minutes    SUBJECTIVE     Pt reports: increase discomfort in left shoulder and low back region secondary to straining at the gym.   Compliance with Hep: Daily  Response to previous treatment: no adverse reactions to treatment/updated HEP  Functional change: No Change    Pain: 2/10   Worst: 7/10  Location: low back  Pain Control: rest and stretching   Aggravating factors: overhead movements     OBJECTIVE     Objective Measures updated at progress report unless specified otherwise.     FUNCTIONAL SCREEN:     Upper Extremity ROM Details STRENGTH Details   Shoulder WNL  discomfort Grossly 4+/5 Pain with abduction     Elbow WNL No Pain  Grossly 5/5     Hand/Wrist WNL No pain  Grossly 5/5        Gait Analysis: The patient ambulated with the following assistive device: none; the pt presents with the following gait abnormalities: decreased step length, jessica, and arm swing;      Movement Analysis:   Functional Test  Outcome   Double Leg Squat Increase forward trunk lean    Single Leg Step Down Trendelenburg sign bilaterally; pain limited patient's ability to perform activity       Treatment     Gym/Equipment Access: increase  secondary to verbal cues   Time to Complete Exercises: Increase secondary to verbal/tactile cues     Cyril received therapeutic exercises to develop strength, endurance, ROM, flexibility, posture, and core stabilization for 10 minutes including:  UBE/ Bike x 7 minutes    Standing Rows x 3x10 7.5 lb cable    Prone Ws x 3x8                                Cyril received the following manual therapy techniques: Joint mobilizations, Manual traction, Myofacial release, Manual Lymphatic Drainage, Soft tissue Mobilization, and Friction Massage were applied to the: bilateral upper traps for 15 minutes, including:  Soft tissue x Upper traps/ scalenes/ supraspinatus/ QLs    Joint Mobs x Cervical side bend    FDN x Upper traps/ infraspinatus (B)                                 Cyril participated in neuromuscular re-education activities to improve: Balance, Coordination, Kinesthetic, Proprioception, and Posture for 15 minutes. The following activities were included:  Wall [place]  x    Upper trap stretch      Chin Tucks  x    Open Books  x 2x10 (B)   Glute bridges   30x   Bosu ball push ups  5x5   [place] press x 3x8 5lbs cable machine    Standing Swimmers  3x8 (B)   Home Exercises and Patient Education Provided     Education/Self-Care provided: (included in treatment) minutes   Patient educated on the impairments noted above and the effects of physical therapy intervention to improve overall condition and QOL.   Patient was educated on all the above exercise prior/during/after for proper posture, positioning, and execution for safe performance with home exercise program.  Exercise Prescription:   12/21/2022: EVAL- Low      Written Home Exercises Provided: yes. Prefers: Electronic Copies  Exercises were reviewed and Cyril was able to demonstrate them prior to the end of the session.  Cyril demonstrated good understanding of the education provided  See EMR under Patient Instructions for exercises provided during therapy  sessions.    ASSESSMENT     Cyril Carlton tolerated PT session well with minimal complaints of pain or discomfort, secondary to poor motor control and decrease muscle endurance. Objective findings are improving with measurements of ROM and functional mobility. Therapy exercises were reviewed by revisiting exercises given from previous home exercise program while adding more dynamic stability exercises. Handouts were not issued during today's visit. Cyril demonstrated good understanding of new exercises and will continue to progress at home until next follow-up.       Cyril Is progressing well towards his goals.   Pt prognosis is Excellent.     Pt will continue to benefit from skilled outpatient physical therapy to address the deficits listed in the problem list box on initial evaluation, provide pt/family education and to maximize pt's level of independence in the home and community environment.     Pt's spiritual, cultural and educational needs considered and pt agreeable to plan of care and goals.    Anticipated barriers to physical therapy: occupation    GOALS:  SHORT TERM GOALS: 4 weeks, (01/21/23) 03/22/23   Recent signs and systems trend is improving in order to progress towards LTG's.  PC   Patient will be independent with HEP in order to further progress and return to maximal function.  PC    Pain rating at Worst: 5/10 in order to progress towards increased independence with activity.  PC    Patient will be able to correct postural deviations in sitting and standing, to decrease pain and promote postural awareness for injury prevention.   PC       LONG TERM GOALS: 8  and continuing weeks, (02/21/23) 03/22/23   Patient will return to normal ADL, recreational, and work related activities with less pain and limitation.    PC   Patient will improve AROM to stated goals in order to return to maximal functional potential.    PC   Patient will improve Strength to stated goals of appropriate musculature in order to  improve functional independence.   PC    Pain Rating at Best: 1/10 to improve Quality of Life.   PC    Patient will meet predicted functional outcome (FOTO) score: TBA% to increase self-worth & perceived functional ability.  PC    Patient will have met/partially met personal goal of: being able to 20lbs overhead without pain   PC       PC = progressing/continue  PM= partially met  DC= discontinue    PLAN     Continue Plan of Care (POC) and progress per patient tolerance. See Treatment section for exercise progression.    Shayne Brody, PT, DPT

## 2023-07-12 ENCOUNTER — PATIENT MESSAGE (OUTPATIENT)
Dept: PHYSICAL MEDICINE AND REHAB | Facility: CLINIC | Age: 28
End: 2023-07-12
Payer: OTHER GOVERNMENT

## 2023-07-13 DIAGNOSIS — M25.512 CHRONIC LEFT SHOULDER PAIN: Primary | ICD-10-CM

## 2023-07-13 DIAGNOSIS — G89.29 CHRONIC LEFT SHOULDER PAIN: Primary | ICD-10-CM

## 2023-07-18 ENCOUNTER — OFFICE VISIT (OUTPATIENT)
Dept: PSYCHIATRY | Facility: CLINIC | Age: 28
End: 2023-07-18
Payer: OTHER GOVERNMENT

## 2023-07-18 DIAGNOSIS — F43.29 STRESS AND ADJUSTMENT REACTION: Primary | ICD-10-CM

## 2023-07-18 DIAGNOSIS — F41.9 ANXIETY: ICD-10-CM

## 2023-07-18 PROCEDURE — 90834 PSYTX W PT 45 MINUTES: CPT | Mod: ,,, | Performed by: SOCIAL WORKER

## 2023-07-18 PROCEDURE — 90834 PR PSYCHOTHERAPY W/PATIENT, 45 MIN: ICD-10-PCS | Mod: ,,, | Performed by: SOCIAL WORKER

## 2023-07-18 NOTE — PROGRESS NOTES
Individual Psychotherapy Follow-up Visit Progress Note (PhD/LCSW)     The patient was informed of the following:     Provider's contact info:  [facility]  [address], [address], [address]  [place] [address]  (Phone) [phone]    Outpatient Psychotherapy - 45 minutes with patient (38-52 minutes)  [id number]    Date: 07/18/2023    Visit Type: in person        7/18/2023  MRN: [id number]  Primary Care Provider: Lon Thomas MD    Cyril Carlton is a 28 y.o. male who presents today for follow-up of depression and anxiety. Met with patient.      Preferred Name: Cyril   Transgender Identity Form    Gender Identity Form  Transition Summary         Subjective:     Last encounter (with this provider): 3/9/2023     Content of Current Session:  Met with this patient for his follow-up in the clinic.  This is the patient's 1st appointment in the clinic and he had trouble finding it which caused him to be a few minutes late.  He stated that he had recently been on a short vacation to [place] with a group of adventure seekers.  The company provided many fun and physical activities for the client to do including hiking, jet skiing and other activities.  The patient discussed his final year in the [employer] before he gets out.  He is planning to apply for business school and wants to get an ASHLEY from a high level school, hopefully [employer] or some other [employer].  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  The patient stated that he is doing well overall and discussed his parents and his relationship to his parents. he stated he would make a follow-up appointment.    Previous Note: Met with this patient for her online follow-up appointment.  The patient was in her home throughout the appointment.  She stated that she had been having migraines lately but had gotten help from the doctor's office.  She states that she broke up with her young boyfriend  and he also was in shelter arrested on [address] with a gun and drugs with him.  She stated that she has made some other female friends as well.  She is trying to be more sociable and develop healthier communication habits.  She stated she would make a follow-up appointment.    Therapeutic Interventions Utilized During Current Session: Acceptance and Commitment Therapy, Cognitive Behavioral Therapy, Mindfulness-Based Cognitive Therapy    No flowsheet data found.   0-4 = Minimal anxiety  5-9 = Mild anxiety  10-14 = Moderate anxiety  15-21 = Severe anxiety     PHQ-9 Depression Patient Health Questionnaire 7/18/2023 5/26/2023 3/21/2023 3/2/2023 1/10/2023 12/5/2022   Patient agreed to terms: Yes Yes Yes Yes Yes Yes   Little interest or pleasure in doing things 0 0 0 0 0 0   Feeling down, depressed, or hopeless 0 0 0 0 0 0   Trouble falling or staying asleep, or sleeping too much 0 0 0 0 0 1   Feeling tired or having little energy 0 0 0 0 0 0   Poor appetite or overeating 0 0 0 0 0 0   Feeling bad about yourself - or that you are a failure or have let yourself or your family down 0 0 0 0 0 0   Trouble concentrating on things, such as reading the newspaper or watching television 0 0 0 0 1 0   Moving or speaking so slowly that other people could have noticed. Or the opposite - being so fidgety or restless that you have been moving around a lot more than usual 0 0 0 0 0 0   Thoughts that you would be better off dead, or of hurting yourself in some way 0 0 0 0 0 0   PHQ-9 Total Score 0 0 0 0 1 1   If you checked off any problems, how difficult have these problems made it for you to do your work, take care of things at home, or get along with other people? Not difficult at all Not difficult at all Not difficult at all Not difficult at all Not difficult at all Not difficult at all   Interpretation Minimal or None Minimal or None Minimal or None Minimal or None Minimal or None Minimal or None     0-4 = No intervention  5 to 9  = Mild  10 to 14 = Moderate  15 to 19 = Moderately severe  ?20 = Severe      Objective:       Mental Status Evaluation  Appearance: unremarkable, age appropriate  Behavior: normal, cooperative  Speech: normal tone, normal rate, normal pitch, normal volume  Mood: steady  Affect: congruent and appropriate  Thought Process: normal and logical  Thought Content: normal, no suicidality, no homicidality, delusions, or paranoia  Sensorium: grossly intact  Cognition: grossly intact  Insight: fair  Judgment: adequate to circumstances    Risk parameters:  Patient reports no suicidal ideation  Patient reports no homicidal ideation  Patient reports no self-injurious behavior  Patient reports no violent behavior      Assessment & Plan:     The patient's response to the interventions is accepting    The patient's progress toward treatment goals is excellent    Homework assigned: attend a support group     Treatment plan:   A. Target symptoms: Depression, Anxiety, Adjustment Disorder, and Poor Coping Skills   B. Therapeutic modalities: behavior modifying psychotherapy  C. Why chosen therapy is appropriate versus another modality: patient responds to this modality   D. Outcome monitoring methods: self report, observation, rating scales, feedback from clinical staff      Visit Diagnosis:   1. Stress and adjustment reaction    2. Anxiety          Follow-up: individual psychotherapy    Return to Clinic: as scheduled  Pt Reported to Schedule Self via Epic EMR MyChart Application and/or Department Support Staff

## 2023-07-19 ENCOUNTER — PATIENT MESSAGE (OUTPATIENT)
Dept: INTERNAL MEDICINE | Facility: CLINIC | Age: 28
End: 2023-07-19
Payer: OTHER GOVERNMENT

## 2023-07-19 DIAGNOSIS — F43.29 STRESS AND ADJUSTMENT REACTION: ICD-10-CM

## 2023-07-19 DIAGNOSIS — Z65.8 RELATIONAL PROBLEM: ICD-10-CM

## 2023-07-19 DIAGNOSIS — R41.840 DIFFICULTY CONCENTRATING: ICD-10-CM

## 2023-07-21 ENCOUNTER — CLINICAL SUPPORT (OUTPATIENT)
Dept: REHABILITATION | Facility: HOSPITAL | Age: 28
End: 2023-07-21
Payer: OTHER GOVERNMENT

## 2023-07-21 DIAGNOSIS — G89.29 CHRONIC NECK AND BACK PAIN: Primary | ICD-10-CM

## 2023-07-21 DIAGNOSIS — M54.9 CHRONIC NECK AND BACK PAIN: Primary | ICD-10-CM

## 2023-07-21 DIAGNOSIS — M54.2 CHRONIC NECK AND BACK PAIN: Primary | ICD-10-CM

## 2023-07-21 DIAGNOSIS — M25.60 DECREASED RANGE OF MOTION WITH DECREASED STRENGTH: ICD-10-CM

## 2023-07-21 DIAGNOSIS — R53.1 DECREASED RANGE OF MOTION WITH DECREASED STRENGTH: ICD-10-CM

## 2023-07-21 PROCEDURE — 97140 MANUAL THERAPY 1/> REGIONS: CPT

## 2023-07-21 PROCEDURE — 97112 NEUROMUSCULAR REEDUCATION: CPT

## 2023-07-21 PROCEDURE — 97110 THERAPEUTIC EXERCISES: CPT

## 2023-07-21 NOTE — PROGRESS NOTES
[facility]  Physical Therapy Treatment Note + Updated Plan of Care      Name: Cyril Carlton  Clinic Number: [id number]    Therapy Diagnosis:   Encounter Diagnoses   Name Primary?    Chronic neck and back pain Yes    Decreased range of motion with decreased strength      Physician: Lon Thomas MD  Visit Date: 7/21/2023  Physician Orders: PT Eval and Treat  Medical Diagnosis from Referral: Neck Pain   Evaluation Date: 12/21/2022  Authorization Period Expiration: 12/31/22  Plan of Care Expiration: 08/20/2023                          Progress Update: 07/21/2023                        FOTO: 0 / 3    Visit # / Visits authorized: 23 / 30 (+4 visits)                          PRECAUTIONS: Standard Precautions      Time In: 1430  Time Out: 1515  Total Billable Time: 40 minutes    SUBJECTIVE     Pt reports: increase discomfort in left shoulder   Compliance with Hep: Daily  Response to previous treatment: no adverse reactions to treatment/updated HEP  Functional change: No Change    Pain: 2/10   Worst: 7/10  Location: low back  Pain Control: rest and stretching   Aggravating factors: overhead movements     OBJECTIVE     Objective Measures updated at progress report unless specified otherwise.     FUNCTIONAL SCREEN:     Upper Extremity ROM Details STRENGTH Details   Shoulder WNL  discomfort Grossly 4+/5 Pain with abduction     Elbow WNL No Pain  Grossly 5/5     Hand/Wrist WNL No pain  Grossly 5/5        Gait Analysis: The patient ambulated with the following assistive device: none; the pt presents with the following gait abnormalities: decreased step length, jessica, and arm swing;      Movement Analysis:   Functional Test  Outcome   Double Leg Squat Increase forward trunk lean    Single Leg Step Down Trendelenburg sign bilaterally; pain limited patient's ability to perform activity       Treatment     Gym/Equipment Access: increase secondary to verbal cues   Time to Complete Exercises:  Increase secondary to verbal/tactile cues     Cyril received therapeutic exercises to develop strength, endurance, ROM, flexibility, posture, and core stabilization for 10 minutes including:  UBE/ Bike x 7 minutes    Standing Rows x 3x10 7.5 lb cable    Prone Ws x 3x8                                Cyril received the following manual therapy techniques: Joint mobilizations, Manual traction, Myofacial release, Manual Lymphatic Drainage, Soft tissue Mobilization, and Friction Massage were applied to the: bilateral upper traps for 15 minutes, including:  Soft tissue x Upper traps/ scalenes/ supraspinatus/ QLs    Joint Mobs x Cervical side bend    FDN x Upper traps/ infraspinatus (B)                                 Cyril participated in neuromuscular re-education activities to improve: Balance, Coordination, Kinesthetic, Proprioception, and Posture for 15 minutes. The following activities were included:  Wall [place]  x    Upper trap stretch      Chin Tucks  x    Open Books  x 2x10 (B)   Glute bridges   30x   Bosu ball push ups  5x5   Rui press x 3x8 5lbs cable machine    Standing Swimmers  3x8 (B)   Home Exercises and Patient Education Provided     Education/Self-Care provided: (included in treatment) minutes   Patient educated on the impairments noted above and the effects of physical therapy intervention to improve overall condition and QOL.   Patient was educated on all the above exercise prior/during/after for proper posture, positioning, and execution for safe performance with home exercise program.  Exercise Prescription:   12/21/2022: EVAL- Low      Written Home Exercises Provided: yes. Prefers: Electronic Copies  Exercises were reviewed and Cyirl was able to demonstrate them prior to the end of the session.  Cyril demonstrated good understanding of the education provided  See EMR under Patient Instructions for exercises provided during therapy sessions.    ASSESSMENT     Cyril Carlton tolerated PT session well  with minimal complaints of pain or discomfort, secondary to poor motor control and decrease muscle endurance. Objective findings are improving with measurements of ROM and functional mobility. Therapy exercises were reviewed by revisiting exercises given from previous home exercise program while adding more dynamic stability exercises. Handouts were not issued during today's visit. Cyril demonstrated good understanding of new exercises and will continue to progress at home until next follow-up.       Cyril Is progressing well towards his goals.   Pt prognosis is Excellent.     Pt will continue to benefit from skilled outpatient physical therapy to address the deficits listed in the problem list box on initial evaluation, provide pt/family education and to maximize pt's level of independence in the home and community environment.     Pt's spiritual, cultural and educational needs considered and pt agreeable to plan of care and goals.    Anticipated barriers to physical therapy: occupation    GOALS:  SHORT TERM GOALS: 4 weeks, (01/21/23) 03/22/23   Recent signs and systems trend is improving in order to progress towards LTG's.  PC   Patient will be independent with HEP in order to further progress and return to maximal function.  PC    Pain rating at Worst: 5/10 in order to progress towards increased independence with activity.  PC    Patient will be able to correct postural deviations in sitting and standing, to decrease pain and promote postural awareness for injury prevention.   PC       LONG TERM GOALS: 8  and continuing weeks, (02/21/23) 03/22/23   Patient will return to normal ADL, recreational, and work related activities with less pain and limitation.    PC   Patient will improve AROM to stated goals in order to return to maximal functional potential.    PC   Patient will improve Strength to stated goals of appropriate musculature in order to improve functional independence.   PC    Pain Rating at Best: 1/10  to improve Quality of Life.   PC    Patient will meet predicted functional outcome (FOTO) score: TBA% to increase self-worth & perceived functional ability.  PC    Patient will have met/partially met personal goal of: being able to 20lbs overhead without pain   PC       PC = progressing/continue  PM= partially met  DC= discontinue    PLAN     Continue Plan of Care (POC) and progress per patient tolerance. See Treatment section for exercise progression.    Shayne Brody, PT, DPT

## 2023-07-24 ENCOUNTER — PATIENT MESSAGE (OUTPATIENT)
Dept: PSYCHIATRY | Facility: CLINIC | Age: 28
End: 2023-07-24
Payer: OTHER GOVERNMENT

## 2023-07-24 RX ORDER — VENLAFAXINE HYDROCHLORIDE 37.5 MG/1
37.5 CAPSULE, EXTENDED RELEASE ORAL DAILY
Qty: 30 CAPSULE | Refills: 2 | Status: SHIPPED | OUTPATIENT
Start: 2023-07-24 | End: 2023-11-27

## 2023-07-27 ENCOUNTER — TELEPHONE (OUTPATIENT)
Dept: SPORTS MEDICINE | Facility: CLINIC | Age: 28
End: 2023-07-27
Payer: OTHER GOVERNMENT

## 2023-07-27 DIAGNOSIS — M25.512 CHRONIC LEFT SHOULDER PAIN: Primary | ICD-10-CM

## 2023-07-27 DIAGNOSIS — G89.29 CHRONIC LEFT SHOULDER PAIN: Primary | ICD-10-CM

## 2023-07-31 ENCOUNTER — OFFICE VISIT (OUTPATIENT)
Dept: PSYCHIATRY | Facility: CLINIC | Age: 28
End: 2023-07-31
Payer: OTHER GOVERNMENT

## 2023-07-31 DIAGNOSIS — F43.29 STRESS AND ADJUSTMENT REACTION: Primary | ICD-10-CM

## 2023-07-31 DIAGNOSIS — F41.9 ANXIETY: ICD-10-CM

## 2023-07-31 DIAGNOSIS — R41.840 DIFFICULTY CONCENTRATING: ICD-10-CM

## 2023-07-31 PROCEDURE — 90834 PR PSYCHOTHERAPY W/PATIENT, 45 MIN: ICD-10-PCS | Mod: 95,,, | Performed by: SOCIAL WORKER

## 2023-07-31 PROCEDURE — 90834 PSYTX W PT 45 MINUTES: CPT | Mod: 95,,, | Performed by: SOCIAL WORKER

## 2023-07-31 NOTE — PROGRESS NOTES
Individual Psychotherapy Follow-up Visit Progress Note (PhD/LCSW)     Due to the nature of this visit type, a virtual visit with synchronous audio and video, each patient to whom this provider administers behavioral health services by telemedicine is: (1) informed of the relationship between the provider and patient and the respective role of any other health care provider with respect to management of the patient; and (2) notified that he or she may decline to receive services by telemedicine and may withdraw from such care at any time. If technological issues occur, at the professional discretion of the clinical provider, synchronous audio only services may be utilized after unsuccessful attempt(s) to connect via audiovisual services; similarly, if audio only visit occurs, patient's verbal consent will be obtained prior to receipt of service. Prevailing clinical standards of care are upheld despite service methodology; having said this, if the clinical provider is unable to meet the prevailing standards of care, the patient will be rescheduled for the provider's soonest availability - as clinically appropriate.     The patient was informed of the following:     Provider's contact info:  [facility] [address], [address], [address]  [place] [address]  (Phone) [phone]    If technology issues occur, call office phone: Ph: [phone]  If crisis: Dial 911 or go to nearest Emergency Room (ER)  If questions related to privacy practices: contact [facility] Information Department: [phone]    For security purposes, the pt identified that they were at [address]  [place] [address] during today's session and contact number is [phone].    The pt's emergency contact(s) is No emergency contact information on file..    Crisis Disclaimer: Patient was informed that due to the virtual nature of the visit, that if a crisis develops,  protocols will be implemented to ensure patient safety, including but not limited to: 1) Initiating a welfare check with local law enforcement and/or 2) Calling 911.    Outpatient Psychotherapy - 45 minutes with patient (38-52 minutes) - [id number]    Date: 7/31/2023    Visit Type: Telehealth        7/31/2023  MRN: [id number]  Primary Care Provider: Lon Thomas MD    Cyril Carlton is a 28 y.o. male who presents today for follow-up of anxiety, adjustment problems, and relational problem. Met with patient.      Preferred Name: Cyril   Transgender Identity Form    Gender Identity Form  Transition Summary         Subjective:     Last encounter (with this provider): 7/18/2023     Content of Current Session: Met with this patient for his online follow up appointment. The patient was in his office at work throughout the appointment. The patient stated that he had decided to stop taking his antidepressant medication, Effexor, since he felt that his depression symptoms were under control. Cyril stated that he was having a difficult time getting off of the meds and was becoming physically ill every time he stopped taking the medication for a few days. He is currently on a lower dose and is hoping to continue to lower his dose until he is off of it completely. The patient was referred to the Psychiatry Department for evaluation and medication management. The patient discussed his current experiences of anxiety and depression related to his current process of applying for graduate school to receive his ASHLEY. He hopes to attend [employer], although he has increased anxiety aabout taking the GRE exam. Allowed the patient to express his concerns and fears regarding the application to an [employer] program. He stated he would make a follow up appointment.    Therapeutic Interventions Utilized During Current Session: Acceptance and Commitment Therapy, Cognitive Behavioral Therapy, Interpersonal Psychotherapy         No data  to display               0-4 = Minimal anxiety  5-9 = Mild anxiety  10-14 = Moderate anxiety  15-21 = Severe anxiety         7/31/2023     3:34 PM 7/18/2023     2:05 PM 5/26/2023     9:02 AM 3/21/2023     9:22 AM 3/2/2023    11:04 AM 1/10/2023     2:00 PM 12/5/2022     9:23 AM   PHQ-9 Depression Patient Health Questionnaire   Patient agreed to terms: Yes Yes Yes Yes Yes Yes Yes   Little interest or pleasure in doing things 0 0 0 0 0 0 0   Feeling down, depressed, or hopeless 0 0 0 0 0 0 0   Trouble falling or staying asleep, or sleeping too much 0 0 0 0 0 0 1   Feeling tired or having little energy 0 0 0 0 0 0 0   Poor appetite or overeating 0 0 0 0 0 0 0   Feeling bad about yourself - or that you are a failure or have let yourself or your family down 0 0 0 0 0 0 0   Trouble concentrating on things, such as reading the newspaper or watching television 0 0 0 0 0 1 0   Moving or speaking so slowly that other people could have noticed. Or the opposite - being so fidgety or restless that you have been moving around a lot more than usual 0 0 0 0 0 0 0   Thoughts that you would be better off dead, or of hurting yourself in some way 0 0 0 0 0 0 0   PHQ-9 Total Score 0 0 0 0 0 1 1   If you checked off any problems, how difficult have these problems made it for you to do your work, take care of things at home, or get along with other people? Not difficult at all Not difficult at all Not difficult at all Not difficult at all Not difficult at all Not difficult at all Not difficult at all   Interpretation Minimal or None Minimal or None Minimal or None Minimal or None Minimal or None Minimal or None Minimal or None     0-4 = No intervention  5 to 9 = Mild  10 to 14 = Moderate  15 to 19 = Moderately severe  ?20 = Severe      Objective:       Mental Status Evaluation  Appearance: unremarkable, age appropriate  Behavior: normal, cooperative  Speech: normal tone, normal rate, normal pitch, normal volume  Mood: steady  Affect:  congruent and appropriate  Thought Process: normal and logical  Thought Content: normal, no suicidality, no homicidality, delusions, or paranoia  Sensorium: grossly intact  Cognition: grossly intact  Insight: fair  Judgment: adequate to circumstances    Risk parameters:  Patient reports no suicidal ideation  Patient reports no homicidal ideation  Patient reports no self-injurious behavior  Patient reports no violent behavior      Assessment & Plan:     The patient's response to the interventions is accepting    The patient's progress toward treatment goals is fair     Homework assigned: practice relaxation skills daily and implement sleep hygiene routine     Treatment plan:   A. Target symptoms: Depression, Anxiety, and Poor Coping Skills   B. Therapeutic modalities: insight oriented psychotherapy  C. Why chosen therapy is appropriate versus another modality: patient responds to this modality   D. Outcome monitoring methods: self report, observation, rating scales, feedback from clinical staff      Visit Diagnosis:   1. Stress and adjustment reaction    2. Anxiety    3. Difficulty concentrating        Follow-up: individual psychotherapy    Return to Clinic: as scheduled  Pt Reported to Schedule Self via Epic EMR MyChart Application and/or Department Support Staff      Cherry Martinez LCSW  7/31/2023

## 2023-08-02 ENCOUNTER — OFFICE VISIT (OUTPATIENT)
Dept: SPORTS MEDICINE | Facility: CLINIC | Age: 28
End: 2023-08-02
Payer: OTHER GOVERNMENT

## 2023-08-02 ENCOUNTER — HOSPITAL ENCOUNTER (OUTPATIENT)
Dept: RADIOLOGY | Facility: HOSPITAL | Age: 28
Discharge: HOME | End: 2023-08-02
Attending: STUDENT IN AN ORGANIZED HEALTH CARE EDUCATION/TRAINING PROGRAM
Payer: OTHER GOVERNMENT

## 2023-08-02 VITALS — BODY MASS INDEX: 32.44 KG/M2 | HEIGHT: 64 IN | WEIGHT: 190 LBS

## 2023-08-02 DIAGNOSIS — M25.512 CHRONIC LEFT SHOULDER PAIN: ICD-10-CM

## 2023-08-02 DIAGNOSIS — M75.52 SUBACROMIAL BURSITIS OF LEFT SHOULDER JOINT: Primary | ICD-10-CM

## 2023-08-02 DIAGNOSIS — G89.29 CHRONIC LEFT SHOULDER PAIN: ICD-10-CM

## 2023-08-02 PROCEDURE — 99999 PR PBB SHADOW E&M-EST. PATIENT-LVL III: CPT | Mod: PBBFAC,,, | Performed by: STUDENT IN AN ORGANIZED HEALTH CARE EDUCATION/TRAINING PROGRAM

## 2023-08-02 PROCEDURE — 73030 X-RAY EXAM OF SHOULDER: CPT | Mod: TC,LT

## 2023-08-02 PROCEDURE — 99999 PR PBB SHADOW E&M-EST. PATIENT-LVL III: ICD-10-PCS | Mod: PBBFAC,,, | Performed by: STUDENT IN AN ORGANIZED HEALTH CARE EDUCATION/TRAINING PROGRAM

## 2023-08-02 PROCEDURE — 99204 PR OFFICE/OUTPT VISIT, NEW, LEVL IV, 45-59 MIN: ICD-10-PCS | Mod: S$PBB,,, | Performed by: STUDENT IN AN ORGANIZED HEALTH CARE EDUCATION/TRAINING PROGRAM

## 2023-08-02 PROCEDURE — 99204 OFFICE O/P NEW MOD 45 MIN: CPT | Mod: S$PBB,,, | Performed by: STUDENT IN AN ORGANIZED HEALTH CARE EDUCATION/TRAINING PROGRAM

## 2023-08-02 PROCEDURE — 73030 X-RAY EXAM OF SHOULDER: CPT | Mod: 26,LT,, | Performed by: RADIOLOGY

## 2023-08-02 PROCEDURE — 73030 XR SHOULDER COMPLETE 2 OR MORE VIEWS LEFT: ICD-10-PCS | Mod: 26,LT,, | Performed by: RADIOLOGY

## 2023-08-02 PROCEDURE — 99213 OFFICE O/P EST LOW 20 MIN: CPT | Mod: PBBFAC | Performed by: STUDENT IN AN ORGANIZED HEALTH CARE EDUCATION/TRAINING PROGRAM

## 2023-08-02 NOTE — PATIENT INSTRUCTIONS
Assessment:  Cyril Carlton is a 28 y.o. male   Chief Complaint   Patient presents with    Left Shoulder - Pain       Encounter Diagnosis   Name Primary?    Chronic left shoulder pain         Plan:  Apply topical diclofenac (Voltaren) up to 4 times a day to the affected area.  It can be bought over the counter at any local pharmacy.    Patient can start ice every 2 hours for 15 minutes as needed  Continue Physical therapy    Follow-up: 6 weeks or sooner if there are any problems between now and then.    Thank you for choosing [facility] and Dr. Luis Tenorio for your orthopedic & sports medicine care. It is our goal to provide you with exceptional care that will help keep you healthy, active, and get you back in the game.    Please do not hesitate to reach out to us via email, phone, or MyChart with any questions, concerns, or feedback.    If you felt that you received exemplary care today, please consider leaving us feedback on [facility] at:  [email or web address]    If you are experiencing pain/discomfort ,or have questions after 5pm and would like to be connected to the [facility] on-call team, please call this number and specify which Sports Medicine provider is treating you: [phone]

## 2023-08-02 NOTE — PROGRESS NOTES
"        Patient ID: Cyril Carlton  YOB: 1995  MRN: [id number]    Chief Complaint: Pain of the Left Shoulder      Referred By: Rhonda Bustillo MD for Left Shoulder Pain    History of Present Illness: Cyril Carlton is a right-hand dominant 28 y.o. male who presents today with left shoulder pain.  Patient reports having shoulder pain for over 1 1/2 months.  He states that he noticed pain in the anterior shoulder after working out his shoulders, the following several days he felt like his shoulder was "out of place".  He has been attending physical therapy at [facility] with Shayne Brody PT and believes that he has improved but has reached a plateau.  He states that his pain is an intermittent pain that he rates at a 2/10 when present and that it is mainly located in the anterior shoulder region.  He reports that the pain is sharp in nature and that certain movements overhead usually "trigger" the pain.  He states that when sleeping on the left side he has noticed that his left arm goes to sleep so he is unable to sleep on that side any longer.  He states that he feels he only has about 80% of his strength back in his left arm.  He initially took OTC Advil and Tylenol but does not take anything at this time for his symptoms.      The patient is active in none.  Occupation:  / [employer]      Past Medical History:   Past Medical History:   Diagnosis Date    Patient denies medical problems      Past Surgical History:   Procedure Laterality Date    NO PAST SURGERIES       Family History   Problem Relation Age of Onset    No Known Problems Mother     No Known Problems Father     No Known Problems Sister     Diabetes Maternal Grandmother     Diabetes Paternal Grandmother     Diabetes Paternal Grandfather      Social History     Socioeconomic History    Marital status: Single   Tobacco Use    Smoking status: Never    Smokeless tobacco: Never   Substance and Sexual Activity    Alcohol use: Yes     Alcohol/week: " 2.0 standard drinks of alcohol     Types: 2 Cans of beer per week    Drug use: Never    Sexual activity: Yes     Partners: Female     Medication List with Changes/Refills   Current Medications    VENLAFAXINE (EFFEXOR-XR) 37.5 MG 24 HR CAPSULE    Take 1 capsule (37.5 mg total) by mouth once daily.   Discontinued Medications    MELOXICAM (MOBIC) 15 MG TABLET    Take 1 tablet (15 mg total) by mouth daily as needed for Pain.    TRIAMCINOLONE ACETONIDE 0.1% (KENALOG) 0.1 % CREAM    Apply topically 2 (two) times daily. for 7 days     Review of patient's allergies indicates:  No Known Allergies    Physical Exam:   Body mass index is 32.61 kg/m².    GENERAL: Well appearing, in no acute distress.  HEAD: Normocephalic and atraumatic.  ENT: External ears and nose grossly normal.  EYES: EOMI bilaterally  PULMONARY: Respirations are grossly even and non-labored.  NEURO: Awake, alert, and oriented x 3.  SKIN: No obvious rashes appreciated.  PSYCH: Mood & affect are appropriate.    Detailed MSK exam:     Left shoulder exam:   -ROM: abduction 140, forward flexion 140, external rotation 90, internal rotation 70  -empty can test negative, resisted ER negative, belly press negative  -fuentes test negative, neers test negative, whipple test negative  -biceps load test negative, yerguson test negative, Dodge's test positive  -sensation intact, pulses 2+  -TTP: none    Right shoulder exam:   -ROM: abduction 140, forward flexion 140, external rotation 90, internal rotation 70  -empty can test negative, resisted ER negative, belly press negative  -fuentes test negative, neers test negative, whipple test negative  -biceps load test negative, yerguson test negative, Dodge's test negative  -sensation intact, pulses 2+  -TTP: none      Imaging:  X-Ray Lumbar Complete Including Flex And Ext  Narrative: EXAMINATION:  XR LUMBAR SPINE 5 VIEW WITH FLEX AND EXT    CLINICAL HISTORY:  Other intervertebral disc degeneration, lumbar  region    TECHNIQUE:  Five views of the lumbar spine plus flexion extension views were performed.    COMPARISON:  None.    FINDINGS:  There are 5 conventional lumbar type vertebral bodies present.  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  Radiolucencies along the pars interarticularis at L5 suggest bilateral L5 spondylolysis.  This could be confirmed with cross-sectional CT.    Hypoplastic T12 ribs are present.    The lumbar vertebral body heights are well preserved.  There is loss of lumbar disc height at L4-5 and L5-S1 with endplate sclerosis and facet arthrosis most significant at L4-5 and L5-S1.    No translational instability upon weight-bearing flexion and extension maneuvering.  Impression: 1. Loss of lumbar disc height at L4-5 and L5-S1 with facet arthrosis at L4-5 and L5-S1.  2. Radiolucency suggesting bilateral L5 spondylolysis.  CT correlation would be diagnostic.    Electronically signed by: Zack García MD  Date:    08/08/2022  Time:    12:07        Relevant imaging results were reviewed and interpreted by me and per my read shows no acute abnormalities on shoulder radiographs.  This was discussed with the patient and / or family today.     Assessment:  Cyril Carlton is a 28 y.o. male presenting with left shoulder pain.   History, physical and radiographs are consistent with a likely diagnosis of bursitis, possible labral tear but pain seems to localize more around AC joint as opposed to deep within the shoulder.   Plan: continue PT, ice/heat, voltaren gel as needed. Consider steroid injection if not improving. Consider advanced imaging if not improving. If concern for labral tear still exists on re-examination and are considering advanced imaging, will need to do MR arthrogram. Continue conservative management for pain.   Follow up 6 weeks. All questions answered.      Subacromial bursitis of left shoulder joint    Chronic left shoulder  pain  -     Ambulatory referral/consult to Orthopedics             A copy of today's visit note has been sent to the referring provider.     Electronically signed:  Luis Tenorio MD, MPH  08/02/2023  12:01 PM

## 2023-08-04 ENCOUNTER — CLINICAL SUPPORT (OUTPATIENT)
Dept: REHABILITATION | Facility: HOSPITAL | Age: 28
End: 2023-08-04
Payer: OTHER GOVERNMENT

## 2023-08-04 DIAGNOSIS — R53.1 DECREASED RANGE OF MOTION WITH DECREASED STRENGTH: ICD-10-CM

## 2023-08-04 DIAGNOSIS — M25.60 DECREASED RANGE OF MOTION WITH DECREASED STRENGTH: ICD-10-CM

## 2023-08-04 DIAGNOSIS — M54.9 CHRONIC NECK AND BACK PAIN: Primary | ICD-10-CM

## 2023-08-04 DIAGNOSIS — M54.2 CHRONIC NECK AND BACK PAIN: Primary | ICD-10-CM

## 2023-08-04 DIAGNOSIS — G89.29 CHRONIC NECK AND BACK PAIN: Primary | ICD-10-CM

## 2023-08-04 PROCEDURE — 97140 MANUAL THERAPY 1/> REGIONS: CPT

## 2023-08-04 PROCEDURE — 97110 THERAPEUTIC EXERCISES: CPT

## 2023-08-04 PROCEDURE — 97112 NEUROMUSCULAR REEDUCATION: CPT

## 2023-08-04 NOTE — PROGRESS NOTES
[facility]  Physical Therapy Treatment Note      Name: Cyril Carlton  [facility] Number: [id number]    Therapy Diagnosis:   Encounter Diagnoses   Name Primary?    Chronic neck and back pain Yes    Decreased range of motion with decreased strength      Physician: Lon Thomas MD  Visit Date: 8/4/2023  Physician Orders: PT Eval and Treat  Medical Diagnosis from Referral: Neck Pain   Evaluation Date: 12/21/2022  Authorization Period Expiration: 12/31/22  Plan of Care Expiration: 08/20/2023                          Progress Update: 07/21/2023                        FOTO: 0 / 3    Visit # / Visits authorized: 24 / 30 (+4 visits)                          PRECAUTIONS: Standard Precautions      Time In: 1145  Time Out: 1230  Total Billable Time: 40 minutes    SUBJECTIVE     Pt reports: improvement in discomfort in left shoulder   Compliance with Hep: Daily  Response to previous treatment: no adverse reactions to treatment/updated HEP  Functional change: No Change    Pain: 2/10   Worst: 7/10  Location: low back  Pain Control: rest and stretching   Aggravating factors: overhead movements     OBJECTIVE     Objective Measures updated at progress report unless specified otherwise.   (Measurements performed on 07/21/23)  FUNCTIONAL SCREEN:     Upper Extremity ROM Details STRENGTH Details   Shoulder WNL  discomfort Grossly 4+/5 Pain with abduction     Elbow WNL No Pain  Grossly 5/5     Hand/Wrist WNL No pain  Grossly 5/5        Gait Analysis: The patient ambulated with the following assistive device: none; the pt presents with the following gait abnormalities: decreased step length, jessica, and arm swing;      Movement Analysis:   Functional Test  Outcome   Double Leg Squat Increase forward trunk lean    Single Leg Step Down Trendelenburg sign bilaterally; pain limited patient's ability to perform activity       Treatment     Gym/Equipment Access: increase secondary to verbal cues   Time to Complete  Exercises: Increase secondary to verbal/tactile cues     Cyril received therapeutic exercises to develop strength, endurance, ROM, flexibility, posture, and core stabilization for 10 minutes including:  UBE/ Bike x 7 minutes    Standing Rows x 3x10 7.5 lb cable    Prone Ws x 3x8                                Cyril received the following manual therapy techniques: Joint mobilizations, Manual traction, Myofacial release, Manual Lymphatic Drainage, Soft tissue Mobilization, and Friction Massage were applied to the: bilateral upper traps for 15 minutes, including:  Soft tissue x Upper traps/ scalenes/ supraspinatus/ QLs    Joint Mobs x Cervical side bend    FDN x Upper traps/ infraspinatus (B)                                 Cyril participated in neuromuscular re-education activities to improve: Balance, Coordination, Kinesthetic, Proprioception, and Posture for 15 minutes. The following activities were included:  Wall [place]  x    Upper trap stretch      Chin Tucks  x    Open Books  x 2x10 (B)   Glute bridges   30x   Bosu ball push ups  5x5   [place] press x 3x8 5lbs cable machine    Standing Swimmers  3x8 (B)   Home Exercises and Patient Education Provided     Education/Self-Care provided: (included in treatment) minutes   Patient educated on the impairments noted above and the effects of physical therapy intervention to improve overall condition and QOL.   Patient was educated on all the above exercise prior/during/after for proper posture, positioning, and execution for safe performance with home exercise program.  Exercise Prescription:   12/21/2022: EVAL- Low      Written Home Exercises Provided: yes. Prefers: Electronic Copies  Exercises were reviewed and Cyril was able to demonstrate them prior to the end of the session.  Cyril demonstrated good understanding of the education provided  See EMR under Patient Instructions for exercises provided during therapy sessions.    ASSESSMENT     Cyril Carlton tolerated PT  session well with minimal complaints discomfort, secondary to poor motor control and decrease muscle endurance. Objective findings are improving with measurements of ROM and functional mobility.Therapy exercises were reviewed by revisiting exercises given from previous home exercise program while adding more dynamic stability exercises. Handouts were not issued during today's visit. Cyril demonstrated good understanding of new exercises and will continue to progress at home until next follow-up.       Cyril Is progressing well towards his goals.   Pt prognosis is Excellent.     Pt will continue to benefit from skilled outpatient physical therapy to address the deficits listed in the problem list box on initial evaluation, provide pt/family education and to maximize pt's level of independence in the home and community environment.     Pt's spiritual, cultural and educational needs considered and pt agreeable to plan of care and goals.    Anticipated barriers to physical therapy: occupation    GOALS:  SHORT TERM GOALS: 4 weeks, (01/21/23) 03/22/23   Recent signs and systems trend is improving in order to progress towards LTG's.  PC   Patient will be independent with HEP in order to further progress and return to maximal function.  PC    Pain rating at Worst: 5/10 in order to progress towards increased independence with activity.  PC    Patient will be able to correct postural deviations in sitting and standing, to decrease pain and promote postural awareness for injury prevention.   PC       LONG TERM GOALS: 8  and continuing weeks, (02/21/23) 03/22/23   Patient will return to normal ADL, recreational, and work related activities with less pain and limitation.    PC   Patient will improve AROM to stated goals in order to return to maximal functional potential.    PC   Patient will improve Strength to stated goals of appropriate musculature in order to improve functional independence.   PC    Pain Rating at Best: 1/10  to improve Quality of Life.   PC    Patient will meet predicted functional outcome (FOTO) score: TBA% to increase self-worth & perceived functional ability.  PC    Patient will have met/partially met personal goal of: being able to 20lbs overhead without pain   PC       PC = progressing/continue  PM= partially met  DC= discontinue    PLAN     Continue Plan of Care (POC) and progress per patient tolerance. See Treatment section for exercise progression.    Shayne Brody, PT, DPT

## 2023-08-09 ENCOUNTER — PATIENT MESSAGE (OUTPATIENT)
Dept: INTERNAL MEDICINE | Facility: CLINIC | Age: 28
End: 2023-08-09
Payer: OTHER GOVERNMENT

## 2023-08-11 ENCOUNTER — CLINICAL SUPPORT (OUTPATIENT)
Dept: REHABILITATION | Facility: HOSPITAL | Age: 28
End: 2023-08-11
Payer: OTHER GOVERNMENT

## 2023-08-11 DIAGNOSIS — M25.60 DECREASED RANGE OF MOTION WITH DECREASED STRENGTH: ICD-10-CM

## 2023-08-11 DIAGNOSIS — M54.2 CHRONIC NECK AND BACK PAIN: Primary | ICD-10-CM

## 2023-08-11 DIAGNOSIS — R53.1 DECREASED RANGE OF MOTION WITH DECREASED STRENGTH: ICD-10-CM

## 2023-08-11 DIAGNOSIS — G89.29 CHRONIC NECK AND BACK PAIN: Primary | ICD-10-CM

## 2023-08-11 DIAGNOSIS — M54.9 CHRONIC NECK AND BACK PAIN: Primary | ICD-10-CM

## 2023-08-11 PROCEDURE — 97110 THERAPEUTIC EXERCISES: CPT

## 2023-08-11 PROCEDURE — 97112 NEUROMUSCULAR REEDUCATION: CPT

## 2023-08-11 PROCEDURE — 97140 MANUAL THERAPY 1/> REGIONS: CPT

## 2023-08-11 NOTE — PROGRESS NOTES
[facility]  Physical Therapy Treatment Note      Name: Cyril Carlton  [facility] Number: [id number]    Therapy Diagnosis:   Encounter Diagnoses   Name Primary?    Chronic neck and back pain Yes    Decreased range of motion with decreased strength      Physician: Lon Thomas MD  Visit Date: 8/11/2023  Physician Orders: PT Eval and Treat  Medical Diagnosis from Referral: Neck Pain   Evaluation Date: 12/21/2022  Authorization Period Expiration: 12/31/22  Plan of Care Expiration: 08/20/2023                          Progress Update: 07/21/2023                        FOTO: 0 / 3    Visit # / Visits authorized: 25 / 30 (+4 visits)                          PRECAUTIONS: Standard Precautions      Time In: 1315  Time Out: 1400  Total Billable Time: 40 minutes    SUBJECTIVE     Pt reports: improvement in discomfort in left shoulder   Compliance with Hep: Daily  Response to previous treatment: no adverse reactions to treatment/updated HEP  Functional change: No Change    Pain: 2/10   Worst: 7/10  Location: low back  Pain Control: rest and stretching   Aggravating factors: overhead movements     OBJECTIVE     Objective Measures updated at progress report unless specified otherwise.     (Measurements performed on 07/21/23)  FUNCTIONAL SCREEN:     Upper Extremity ROM Details STRENGTH Details   Shoulder WNL  discomfort Grossly 4+/5 Pain with abduction     Elbow WNL No Pain  Grossly 5/5     Hand/Wrist WNL No pain  Grossly 5/5        Gait Analysis: The patient ambulated with the following assistive device: none; the pt presents with the following gait abnormalities: decreased step length, jessica, and arm swing;      Movement Analysis:   Functional Test  Outcome   Double Leg Squat Increase forward trunk lean    Single Leg Step Down Trendelenburg sign bilaterally; pain limited patient's ability to perform activity       Treatment     Gym/Equipment Access: increase secondary to verbal cues   Time to  Complete Exercises: Increase secondary to verbal/tactile cues     Cyril received therapeutic exercises to develop strength, endurance, ROM, flexibility, posture, and core stabilization for 10 minutes including:  UBE/ Bike x 7 minutes    Standing Rows x 3x10 7.5 lb cable    Prone Ws x 3x8                                Cyril received the following manual therapy techniques: Joint mobilizations, Manual traction, Myofacial release, Manual Lymphatic Drainage, Soft tissue Mobilization, and Friction Massage were applied to the: bilateral upper traps for 15 minutes, including:  Soft tissue x Upper traps/ scalenes/ supraspinatus/ QLs    Joint Mobs x Cervical side bend    FDN x Upper traps/ infraspinatus (B)                                 Cyril participated in neuromuscular re-education activities to improve: Balance, Coordination, Kinesthetic, Proprioception, and Posture for 15 minutes. The following activities were included:  Wall [place]  x    Upper trap stretch      Chin Tucks  x    Open Books  x 2x10 (B)   Glute bridges   30x   Bosu ball push ups  5x5   [place] press x 3x8 5lbs cable machine    Standing Swimmers  3x8 (B)   Home Exercises and Patient Education Provided     Education/Self-Care provided: (included in treatment) minutes   Patient educated on the impairments noted above and the effects of physical therapy intervention to improve overall condition and QOL.   Patient was educated on all the above exercise prior/during/after for proper posture, positioning, and execution for safe performance with home exercise program.  Exercise Prescription:   12/21/2022: EVAL- Low      Written Home Exercises Provided: yes. Prefers: Electronic Copies  Exercises were reviewed and Cyril was able to demonstrate them prior to the end of the session.  Cyril demonstrated good understanding of the education provided  See EMR under Patient Instructions for exercises provided during therapy sessions.    ASSESSMENT     Cyril Carlton  tolerated PT session well with minimal complaints discomfort, secondary to poor motor control and decrease muscle endurance. Objective findings are improving with measurements of ROM and functional mobility.Therapy exercises were reviewed by revisiting exercises given from previous home exercise program while adding more dynamic stability exercises. Handouts were not issued during today's visit. Cyril demonstrated good understanding of new exercises and will continue to progress at home until next follow-up.       Cyril Is progressing well towards his goals.   Pt prognosis is Excellent.     Pt will continue to benefit from skilled outpatient physical therapy to address the deficits listed in the problem list box on initial evaluation, provide pt/family education and to maximize pt's level of independence in the home and community environment.     Pt's spiritual, cultural and educational needs considered and pt agreeable to plan of care and goals.    Anticipated barriers to physical therapy: occupation    GOALS:  SHORT TERM GOALS: 4 weeks, (01/21/23) 03/22/23   Recent signs and systems trend is improving in order to progress towards LTG's.  PC   Patient will be independent with HEP in order to further progress and return to maximal function.  PC    Pain rating at Worst: 5/10 in order to progress towards increased independence with activity.  PC    Patient will be able to correct postural deviations in sitting and standing, to decrease pain and promote postural awareness for injury prevention.   PC       LONG TERM GOALS: 8  and continuing weeks, (02/21/23) 03/22/23   Patient will return to normal ADL, recreational, and work related activities with less pain and limitation.    PC   Patient will improve AROM to stated goals in order to return to maximal functional potential.    PC   Patient will improve Strength to stated goals of appropriate musculature in order to improve functional independence.   PC    Pain Rating  at Best: 1/10 to improve Quality of Life.   PC    Patient will meet predicted functional outcome (FOTO) score: TBA% to increase self-worth & perceived functional ability.  PC    Patient will have met/partially met personal goal of: being able to 20lbs overhead without pain   PC       PC = progressing/continue  PM= partially met  DC= discontinue    PLAN     Continue Plan of Care (POC) and progress per patient tolerance. See Treatment section for exercise progression.    Shayne Brody, PT, DPT

## 2023-08-18 ENCOUNTER — CLINICAL SUPPORT (OUTPATIENT)
Dept: REHABILITATION | Facility: HOSPITAL | Age: 28
End: 2023-08-18
Payer: OTHER GOVERNMENT

## 2023-08-18 ENCOUNTER — TELEPHONE (OUTPATIENT)
Dept: PSYCHIATRY | Facility: CLINIC | Age: 28
End: 2023-08-18
Payer: OTHER GOVERNMENT

## 2023-08-18 DIAGNOSIS — R53.1 DECREASED RANGE OF MOTION WITH DECREASED STRENGTH: ICD-10-CM

## 2023-08-18 DIAGNOSIS — M54.2 CHRONIC NECK AND BACK PAIN: Primary | ICD-10-CM

## 2023-08-18 DIAGNOSIS — G89.29 CHRONIC NECK AND BACK PAIN: Primary | ICD-10-CM

## 2023-08-18 DIAGNOSIS — M25.60 DECREASED RANGE OF MOTION WITH DECREASED STRENGTH: ICD-10-CM

## 2023-08-18 DIAGNOSIS — M54.9 CHRONIC NECK AND BACK PAIN: Primary | ICD-10-CM

## 2023-08-18 PROCEDURE — 97112 NEUROMUSCULAR REEDUCATION: CPT

## 2023-08-18 PROCEDURE — 97110 THERAPEUTIC EXERCISES: CPT

## 2023-08-18 PROCEDURE — 97140 MANUAL THERAPY 1/> REGIONS: CPT

## 2023-08-18 NOTE — TELEPHONE ENCOUNTER
----- Message from Malena Pryor sent at 8/18/2023  1:02 PM CDT -----  Pt advise he cannot make appointment please cancel and reschedule.Thanks [phone]

## 2023-08-18 NOTE — PROGRESS NOTES
[facility]  Physical Therapy Treatment Note + Updated Plan of Care      Name: Cyril Carlton  Clinic Number: [id number]    Therapy Diagnosis:   Encounter Diagnoses   Name Primary?    Chronic neck and back pain Yes    Decreased range of motion with decreased strength      Physician: Lon Thomas MD  Visit Date: 8/18/2023  Physician Orders: PT Eval and Treat  Medical Diagnosis from Referral: Neck Pain   Evaluation Date: 12/21/2022  Authorization Period Expiration: 12/31/22  Plan of Care Expiration: 09/18/2023                          Progress Update: 08/18/2023                        FOTO: 0 / 3    Visit # / Visits authorized: 26 / 30 (+4 visits)                          PRECAUTIONS: Standard Precautions      Time In: 1100  Time Out: 1145  Total Billable Time: 40 minutes    SUBJECTIVE     Pt reports: improvement in discomfort in left shoulder and low back by 85%  Compliance with Hep: Daily  Response to previous treatment: no adverse reactions to treatment/updated HEP  Functional change: No Change    Pain: 2/10   Worst: 7/10  Location: low back  Pain Control: rest and stretching   Aggravating factors: overhead movements     OBJECTIVE     Objective Measures updated at progress report unless specified otherwise.     (Measurements performed on 08/18/23)  FUNCTIONAL SCREEN:     Upper Extremity ROM Details STRENGTH Strength  08/18/23 Details   Shoulder WNL  discomfort Grossly 4+/5 Grossly 5/5 Pain with abduction     Elbow WNL No Pain  Grossly 5/5 Grossly 5/5     Hand/Wrist WNL No pain  Grossly 5/5 Grossly 5/5        Gait Analysis: The patient ambulated with the following assistive device: none; the pt presents with the following gait abnormalities: decreased step length.       Movement Analysis:   Functional Test  Outcome   Double Leg Squat WNL    Single Leg Step Down Trendelenburg sign bilaterally; pain limited patient's ability to perform activity       Treatment     Gym/Equipment Access:  increase secondary to verbal cues   Time to Complete Exercises: Increase secondary to verbal/tactile cues     Cyril received therapeutic exercises to develop strength, endurance, ROM, flexibility, posture, and core stabilization for 10 minutes including:  UBE/ Bike x 7 minutes    Standing Rows x 3x10 7.5 lb cable    Prone Ws x 3x8                                Cyril received the following manual therapy techniques: Joint mobilizations, Manual traction, Myofacial release, Manual Lymphatic Drainage, Soft tissue Mobilization, and Friction Massage were applied to the: bilateral upper traps for 15 minutes, including:  Soft tissue x Upper traps/ scalenes/ supraspinatus/ QLs    Joint Mobs x Cervical side bend    FDN x Upper traps/ infraspinatus (B)                                 Cyril participated in neuromuscular re-education activities to improve: Balance, Coordination, Kinesthetic, Proprioception, and Posture for 15 minutes. The following activities were included:  Wall [place]  x    Upper trap stretch      Chin Tucks  x    Open Books  x 2x10 (B)   Glute bridges   30x   Bosu ball push ups  5x5   Colombian press x 3x8 5lbs cable machine    Standing Swimmers  3x8 (B)   Home Exercises and Patient Education Provided     Education/Self-Care provided: (included in treatment) minutes   Patient educated on the impairments noted above and the effects of physical therapy intervention to improve overall condition and QOL.   Patient was educated on all the above exercise prior/during/after for proper posture, positioning, and execution for safe performance with home exercise program.  Exercise Prescription:   12/21/2022: EVAL- Low      Written Home Exercises Provided: yes. Prefers: Electronic Copies  Exercises were reviewed and Cyril was able to demonstrate them prior to the end of the session.  Cyril demonstrated good understanding of the education provided  See EMR under Patient Instructions for exercises provided during therapy  sessions.    ASSESSMENT     Cyril Carlton tolerated PT session well with minimal complaints discomfort, secondary to poor motor control and decrease muscle endurance. Objective findings are improving with measurements of ROM and functional mobility.Therapy exercises were reviewed by revisiting exercises given from previous home exercise program while adding more dynamic stability exercises. Handouts were not issued during today's visit. Cyril demonstrated good understanding of new exercises and will continue to progress at home until next follow-up.       Cyril Is progressing well towards his goals.   Pt prognosis is Excellent.     Pt will continue to benefit from skilled outpatient physical therapy to address the deficits listed in the problem list box on initial evaluation, provide pt/family education and to maximize pt's level of independence in the home and community environment.     Pt's spiritual, cultural and educational needs considered and pt agreeable to plan of care and goals.    Anticipated barriers to physical therapy: occupation    GOALS:  SHORT TERM GOALS: 4 weeks, (01/21/23) 03/22/23   Recent signs and systems trend is improving in order to progress towards LTG's.  PC   Patient will be independent with HEP in order to further progress and return to maximal function.  PC    Pain rating at Worst: 5/10 in order to progress towards increased independence with activity.  PC    Patient will be able to correct postural deviations in sitting and standing, to decrease pain and promote postural awareness for injury prevention.   PC       LONG TERM GOALS: 8  and continuing weeks, (02/21/23) 03/22/23   Patient will return to normal ADL, recreational, and work related activities with less pain and limitation.    PC   Patient will improve AROM to stated goals in order to return to maximal functional potential.    PC   Patient will improve Strength to stated goals of appropriate musculature in order to improve  functional independence.   PC    Pain Rating at Best: 1/10 to improve Quality of Life.   PC    Patient will meet predicted functional outcome (FOTO) score: TBA% to increase self-worth & perceived functional ability.  PC    Patient will have met/partially met personal goal of: being able to 20lbs overhead without pain   PC       PC = progressing/continue  PM= partially met  DC= discontinue    PLAN     Continue Plan of Care (POC) and progress per patient tolerance. See Treatment section for exercise progression.    Shayne rBody, PT, DPT

## 2023-08-30 ENCOUNTER — LAB VISIT (OUTPATIENT)
Dept: LAB | Facility: HOSPITAL | Age: 28
End: 2023-08-30
Attending: FAMILY MEDICINE
Payer: OTHER GOVERNMENT

## 2023-08-30 ENCOUNTER — OFFICE VISIT (OUTPATIENT)
Dept: INTERNAL MEDICINE | Facility: CLINIC | Age: 28
End: 2023-08-30
Payer: OTHER GOVERNMENT

## 2023-08-30 ENCOUNTER — LAB VISIT (OUTPATIENT)
Dept: LAB | Facility: HOSPITAL | Age: 28
End: 2023-08-30
Payer: OTHER GOVERNMENT

## 2023-08-30 ENCOUNTER — PATIENT MESSAGE (OUTPATIENT)
Dept: INTERNAL MEDICINE | Facility: CLINIC | Age: 28
End: 2023-08-30

## 2023-08-30 VITALS
TEMPERATURE: 98 F | WEIGHT: 200.19 LBS | BODY MASS INDEX: 34.18 KG/M2 | HEART RATE: 83 BPM | DIASTOLIC BLOOD PRESSURE: 80 MMHG | SYSTOLIC BLOOD PRESSURE: 118 MMHG | OXYGEN SATURATION: 98 % | HEIGHT: 64 IN

## 2023-08-30 DIAGNOSIS — M54.2 CHRONIC NECK AND BACK PAIN: ICD-10-CM

## 2023-08-30 DIAGNOSIS — Z11.3 SCREENING FOR STD (SEXUALLY TRANSMITTED DISEASE): ICD-10-CM

## 2023-08-30 DIAGNOSIS — F43.29 STRESS AND ADJUSTMENT REACTION: ICD-10-CM

## 2023-08-30 DIAGNOSIS — M54.9 CHRONIC NECK AND BACK PAIN: ICD-10-CM

## 2023-08-30 DIAGNOSIS — R41.840 DIFFICULTY CONCENTRATING: ICD-10-CM

## 2023-08-30 DIAGNOSIS — R06.83 LOUD SNORING: ICD-10-CM

## 2023-08-30 DIAGNOSIS — Z00.00 ANNUAL PHYSICAL EXAM: ICD-10-CM

## 2023-08-30 DIAGNOSIS — Z00.00 ANNUAL PHYSICAL EXAM: Primary | ICD-10-CM

## 2023-08-30 DIAGNOSIS — R40.0 HAS DAYTIME DROWSINESS: ICD-10-CM

## 2023-08-30 DIAGNOSIS — G89.29 CHRONIC NECK AND BACK PAIN: ICD-10-CM

## 2023-08-30 LAB
ALBUMIN SERPL BCP-MCNC: 4.8 G/DL (ref 3.5–5.2)
ALP SERPL-CCNC: 65 U/L (ref 55–135)
ALT SERPL W/O P-5'-P-CCNC: 27 U/L (ref 10–44)
ANION GAP SERPL CALC-SCNC: 15 MMOL/L (ref 8–16)
AST SERPL-CCNC: 48 U/L (ref 10–40)
BILIRUB SERPL-MCNC: 2.3 MG/DL (ref 0.1–1)
BUN SERPL-MCNC: 15 MG/DL (ref 6–20)
CALCIUM SERPL-MCNC: 9.8 MG/DL (ref 8.7–10.5)
CHLORIDE SERPL-SCNC: 99 MMOL/L (ref 95–110)
CHOLEST SERPL-MCNC: 230 MG/DL (ref 120–199)
CHOLEST/HDLC SERPL: 2.6 {RATIO} (ref 2–5)
CO2 SERPL-SCNC: 25 MMOL/L (ref 23–29)
CREAT SERPL-MCNC: 1.4 MG/DL (ref 0.5–1.4)
EST. GFR  (NO RACE VARIABLE): >60 ML/MIN/1.73 M^2
ESTIMATED AVG GLUCOSE: 94 MG/DL (ref 68–131)
GLUCOSE SERPL-MCNC: 82 MG/DL (ref 70–110)
HBA1C MFR BLD: 4.9 % (ref 4–5.6)
HCV AB SERPL QL IA: NORMAL
HDLC SERPL-MCNC: 90 MG/DL (ref 40–75)
HDLC SERPL: 39.1 % (ref 20–50)
HIV 1+2 AB+HIV1 P24 AG SERPL QL IA: NORMAL
LDLC SERPL CALC-MCNC: 127 MG/DL (ref 63–159)
NONHDLC SERPL-MCNC: 140 MG/DL
POTASSIUM SERPL-SCNC: 4.2 MMOL/L (ref 3.5–5.1)
PROT SERPL-MCNC: 8 G/DL (ref 6–8.4)
SODIUM SERPL-SCNC: 139 MMOL/L (ref 136–145)
TRIGL SERPL-MCNC: 65 MG/DL (ref 30–150)
TSH SERPL DL<=0.005 MIU/L-ACNC: 1.76 UIU/ML (ref 0.4–4)

## 2023-08-30 PROCEDURE — 99999 PR PBB SHADOW E&M-EST. PATIENT-LVL III: CPT | Mod: PBBFAC,,, | Performed by: FAMILY MEDICINE

## 2023-08-30 PROCEDURE — 84443 ASSAY THYROID STIM HORMONE: CPT | Performed by: FAMILY MEDICINE

## 2023-08-30 PROCEDURE — 83036 HEMOGLOBIN GLYCOSYLATED A1C: CPT | Performed by: FAMILY MEDICINE

## 2023-08-30 PROCEDURE — 87591 N.GONORRHOEAE DNA AMP PROB: CPT | Performed by: FAMILY MEDICINE

## 2023-08-30 PROCEDURE — 36415 COLL VENOUS BLD VENIPUNCTURE: CPT | Performed by: FAMILY MEDICINE

## 2023-08-30 PROCEDURE — 86592 SYPHILIS TEST NON-TREP QUAL: CPT | Performed by: FAMILY MEDICINE

## 2023-08-30 PROCEDURE — 99395 PREV VISIT EST AGE 18-39: CPT | Mod: S$PBB,,, | Performed by: FAMILY MEDICINE

## 2023-08-30 PROCEDURE — 86803 HEPATITIS C AB TEST: CPT | Performed by: FAMILY MEDICINE

## 2023-08-30 PROCEDURE — 99999 PR PBB SHADOW E&M-EST. PATIENT-LVL III: ICD-10-PCS | Mod: PBBFAC,,, | Performed by: FAMILY MEDICINE

## 2023-08-30 PROCEDURE — 99213 OFFICE O/P EST LOW 20 MIN: CPT | Mod: PBBFAC | Performed by: FAMILY MEDICINE

## 2023-08-30 PROCEDURE — 85027 COMPLETE CBC AUTOMATED: CPT | Performed by: FAMILY MEDICINE

## 2023-08-30 PROCEDURE — 80061 LIPID PANEL: CPT | Performed by: FAMILY MEDICINE

## 2023-08-30 PROCEDURE — 80053 COMPREHEN METABOLIC PANEL: CPT | Performed by: FAMILY MEDICINE

## 2023-08-30 PROCEDURE — 99395 PR PREVENTIVE VISIT,EST,18-39: ICD-10-PCS | Mod: S$PBB,,, | Performed by: FAMILY MEDICINE

## 2023-08-30 PROCEDURE — 87389 HIV-1 AG W/HIV-1&-2 AB AG IA: CPT | Performed by: FAMILY MEDICINE

## 2023-08-30 NOTE — PROGRESS NOTES
"Subjective:   Patient ID: Cyril Carlton is a 28 y.o. male.  Chief Complaint:  Shoulder Pain    Presents for annual physical exam   Besides chronic complains of neck and shoulder pain, mood related issues, and difficulty focusing additional complains of increased fatigue, loud snoring, excessive daytime drowsiness today  Concerned that he has sleep apnea     Review of Systems   Constitutional:  Positive for fatigue. Negative for activity change, appetite change, chills, diaphoresis, fever and unexpected weight change.   HENT:  Negative for hearing loss, rhinorrhea and trouble swallowing.    Eyes:  Negative for discharge and visual disturbance.   Respiratory:  Negative for cough, chest tightness, shortness of breath and wheezing.    Cardiovascular:  Negative for chest pain, palpitations and leg swelling.   Gastrointestinal:  Negative for abdominal pain, blood in stool, constipation, diarrhea, nausea and vomiting.   Endocrine: Negative for polydipsia and polyuria.   Genitourinary:  Negative for difficulty urinating, dysuria, genital sores, hematuria, penile discharge, penile pain, testicular pain and urgency.   Musculoskeletal:  Positive for arthralgias, back pain and neck pain. Negative for joint swelling and myalgias.   Skin:  Negative for rash.   Neurological:  Negative for dizziness, weakness, light-headedness and headaches.   Psychiatric/Behavioral:  Positive for decreased concentration. Negative for agitation, behavioral problems, confusion, dysphoric mood, hallucinations, self-injury, sleep disturbance and suicidal ideas. The patient is not nervous/anxious and is not hyperactive.        Objective:   /80 (BP Location: Right arm, Patient Position: Sitting, BP Method: Large (Manual))   Pulse 83   Temp 98 °F (36.7 °C) (Tympanic)   Ht 5' 4" (1.626 m)   Wt 90.8 kg (200 lb 2.8 oz)   SpO2 98%   BMI 34.36 kg/m²     Physical Exam  Vitals and nursing note reviewed.   Constitutional:       Appearance: Normal " appearance. He is well-developed. He is obese.   Neck:      Thyroid: No thyroid mass, thyromegaly or thyroid tenderness.   Cardiovascular:      Rate and Rhythm: Normal rate and regular rhythm.      Heart sounds: Normal heart sounds.   Pulmonary:      Effort: Pulmonary effort is normal.      Breath sounds: Normal breath sounds.   Abdominal:      General: There is no distension.      Palpations: Abdomen is soft.      Tenderness: There is no abdominal tenderness.   Skin:     Findings: No rash.   Psychiatric:         Attention and Perception: He is inattentive.         Mood and Affect: Affect normal. Mood is anxious.         Speech: Speech is tangential.         Behavior: Behavior normal. Behavior is cooperative.         Thought Content: Thought content normal.     [place] Questionnaire (validated LELE screening questionnaire)    5/5 -- Snoring/apnea    2/3 -- Fatigue    Body mass index is 34.36 kg/m².  (>25 is overweight, >30 is obese)  Blood Pressure = normal blood pressure    (PreHTN 120-139/80-89, Stg1 140-159/90-99, Stg2 >160/>100)  [place] = 3 of three LELE categories are positive (high risk is 2-3 positive categories)     Assessment:       ICD-10-CM ICD-9-CM   1. Annual physical exam  Z00.00 V70.0   2. Screening for STD (sexually transmitted disease)  Z11.3 V74.5   3. Stress and adjustment reaction  F43.29 309.89   4. Difficulty concentrating  R41.840 799.51   5. Chronic neck and back pain  M54.2 723.1    M54.9 724.5    G89.29 338.29   6. Loud snoring  R06.83 786.09   7. Has daytime drowsiness  R40.0 780.09     Plan:   Annual physical exam  -     CBC Without Differential; Future; Expected date: 08/30/2023  -     Comprehensive Metabolic Panel; Future; Expected date: 08/30/2023  -     Lipid Panel; Future; Expected date: 08/30/2023  -     TSH; Future; Expected date: 08/30/2023  -     Hemoglobin A1C; Future; Expected date: 08/30/2023  Blood pressure normal.  BMI 35.    Check labs.  Treat as indicated.    Colon cancer  screening at 45 years old  Consider prostate cancer screening at 55 years old   Tetanus booster up-to-date   COVID vaccines and booster up-to-date  Flu vaccine advised this season     Screening for STD (sexually transmitted disease)  -     C. trachomatis/N. gonorrhoeae by AMP DNA; Future; Expected date: 08/30/2023  -     HIV 1/2 Ag/Ab (4th Gen); Future; Expected date: 08/30/2023  -     Hepatitis C Antibody; Future; Expected date: 08/30/2023  -     RPR; Future; Expected date: 08/30/2023  Asymptomatic.  No known exposures.  Request screening.    Treat as indicated.      Stress and adjustment reaction  Difficulty concentrating  Continue Effexor XR 37.5 mg daily   Discussed potential for likely underlying mood disorder and need for mood stabilizer   Needs to establish with external Psychology/Psychiatry as planned     Chronic neck and back pain  Continue per orthopedics/sports medicine     Loud snoring  Has daytime drowsiness  -     Home Sleep Study; Future  [place] sleep questionnaire positive   Home sleep study ordered   CPAP if indicated    Return to clinic 1 year for annual physical exam or sooner if needed    I hereby acknowledge that I am relying upon documentation authored by a medical student working under my supervision and further I hereby attest that I have verified the student documentation or findings by personally performing the physical exam and medical decision making activities of the Evaluation and Management service to be billed.  Lon Thomas

## 2023-08-31 ENCOUNTER — TELEPHONE (OUTPATIENT)
Dept: SPORTS MEDICINE | Facility: CLINIC | Age: 28
End: 2023-08-31
Payer: OTHER GOVERNMENT

## 2023-08-31 LAB
ERYTHROCYTE [DISTWIDTH] IN BLOOD BY AUTOMATED COUNT: 12.4 % (ref 11.5–14.5)
HCT VFR BLD AUTO: 46.4 % (ref 40–54)
HGB BLD-MCNC: 15.1 G/DL (ref 14–18)
MCH RBC QN AUTO: 28.1 PG (ref 27–31)
MCHC RBC AUTO-ENTMCNC: 32.5 G/DL (ref 32–36)
MCV RBC AUTO: 86 FL (ref 82–98)
PLATELET # BLD AUTO: 220 K/UL (ref 150–450)
PMV BLD AUTO: 12.1 FL (ref 9.2–12.9)
RBC # BLD AUTO: 5.38 M/UL (ref 4.6–6.2)
RPR SER QL: NORMAL
WBC # BLD AUTO: 6.59 K/UL (ref 3.9–12.7)

## 2023-08-31 NOTE — TELEPHONE ENCOUNTER
Contacted patient and r/s appt due to provider changing locations on originally scheduled appt date.  Patient verbalized understanding of new appt date, time and location.

## 2023-09-01 ENCOUNTER — TELEPHONE (OUTPATIENT)
Dept: PULMONOLOGY | Facility: CLINIC | Age: 28
End: 2023-09-01
Payer: OTHER GOVERNMENT

## 2023-09-01 LAB
C TRACH DNA SPEC QL NAA+PROBE: NOT DETECTED
N GONORRHOEA DNA SPEC QL NAA+PROBE: NOT DETECTED

## 2023-09-03 ENCOUNTER — PATIENT MESSAGE (OUTPATIENT)
Dept: INTERNAL MEDICINE | Facility: CLINIC | Age: 28
End: 2023-09-03
Payer: OTHER GOVERNMENT

## 2023-09-13 ENCOUNTER — HOSPITAL ENCOUNTER (OUTPATIENT)
Dept: RADIOLOGY | Facility: HOSPITAL | Age: 28
Discharge: HOME | End: 2023-09-13
Attending: STUDENT IN AN ORGANIZED HEALTH CARE EDUCATION/TRAINING PROGRAM
Payer: OTHER GOVERNMENT

## 2023-09-13 ENCOUNTER — OFFICE VISIT (OUTPATIENT)
Dept: SPORTS MEDICINE | Facility: CLINIC | Age: 28
End: 2023-09-13
Payer: OTHER GOVERNMENT

## 2023-09-13 VITALS — WEIGHT: 200.19 LBS | BODY MASS INDEX: 34.18 KG/M2 | HEIGHT: 64 IN

## 2023-09-13 DIAGNOSIS — M25.819 SHOULDER IMPINGEMENT: Primary | ICD-10-CM

## 2023-09-13 DIAGNOSIS — M25.511 RIGHT SHOULDER PAIN, UNSPECIFIED CHRONICITY: ICD-10-CM

## 2023-09-13 DIAGNOSIS — M25.511 RIGHT SHOULDER PAIN, UNSPECIFIED CHRONICITY: Primary | ICD-10-CM

## 2023-09-13 DIAGNOSIS — S46.911A STRAIN OF RIGHT SHOULDER, INITIAL ENCOUNTER: ICD-10-CM

## 2023-09-13 PROCEDURE — 99999 PR PBB SHADOW E&M-EST. PATIENT-LVL III: CPT | Mod: PBBFAC,,, | Performed by: STUDENT IN AN ORGANIZED HEALTH CARE EDUCATION/TRAINING PROGRAM

## 2023-09-13 PROCEDURE — 99214 PR OFFICE/OUTPT VISIT, EST, LEVL IV, 30-39 MIN: ICD-10-PCS | Mod: S$PBB,,, | Performed by: STUDENT IN AN ORGANIZED HEALTH CARE EDUCATION/TRAINING PROGRAM

## 2023-09-13 PROCEDURE — 73030 XR SHOULDER COMPLETE 2 OR MORE VIEWS RIGHT: ICD-10-PCS | Mod: 26,RT,, | Performed by: RADIOLOGY

## 2023-09-13 PROCEDURE — 73030 X-RAY EXAM OF SHOULDER: CPT | Mod: TC,RT

## 2023-09-13 PROCEDURE — 99999 PR PBB SHADOW E&M-EST. PATIENT-LVL III: ICD-10-PCS | Mod: PBBFAC,,, | Performed by: STUDENT IN AN ORGANIZED HEALTH CARE EDUCATION/TRAINING PROGRAM

## 2023-09-13 PROCEDURE — 73030 X-RAY EXAM OF SHOULDER: CPT | Mod: 26,RT,, | Performed by: RADIOLOGY

## 2023-09-13 PROCEDURE — 99213 OFFICE O/P EST LOW 20 MIN: CPT | Mod: PBBFAC | Performed by: STUDENT IN AN ORGANIZED HEALTH CARE EDUCATION/TRAINING PROGRAM

## 2023-09-13 PROCEDURE — 99214 OFFICE O/P EST MOD 30 MIN: CPT | Mod: S$PBB,,, | Performed by: STUDENT IN AN ORGANIZED HEALTH CARE EDUCATION/TRAINING PROGRAM

## 2023-09-13 NOTE — PATIENT INSTRUCTIONS
Assessment:  Cyril Carlton is a 28 y.o. male   Chief Complaint   Patient presents with    Left Shoulder - Pain    Follow-up       No diagnosis found.     Plan:  Apply topical diclofenac (Voltaren) up to 4 times a day to the affected area.  It can be bought over the counter at any local pharmacy.    Patient can ice/ heat every 2 hours for 15 minutes as needed  Activity as tolerated    Follow-up: 6 weeks right shoulder or sooner if there are any problems between now and then.    Thank you for choosing [facility] and Dr. Luis Tenorio for your orthopedic & sports medicine care. It is our goal to provide you with exceptional care that will help keep you healthy, active, and get you back in the game.    Please do not hesitate to reach out to us via email, phone, or MyChart with any questions, concerns, or feedback.    If you felt that you received exemplary care today, please consider leaving us feedback on GOSOs at:  [email or web address]    If you are experiencing pain/discomfort ,or have questions after 5pm and would like to be connected to the [facility] on-call team, please call this number and specify which Sports Medicine provider is treating you: [phone]

## 2023-09-13 NOTE — PROGRESS NOTES
"        Patient ID: Cyril Carlton  YOB: 1995  MRN: [id number]    Chief Complaint: Pain of the Left Shoulder and Follow-up      Referred By: Rhonda Bustillo MD for Left Shoulder Pain          History of Present Illness: Cyril Carlton is a right-hand dominant 28 y.o. male who presents today with left shoulder pain followup. Patient reports that left shoulder pain has resolved.  He states that he noticed pain in the right shoulder after working out and playing basketball..  He has been attending physical therapy at [facility]  for hi left shoulder with Shayne Brody PT and believes that he has improved but has reached a plateau.  He states that his pain is an intermittent pain that he rates at a 2/10 when present and that it is mainly located in the anterior shoulder region.  He reports that the pain is constant and sharp  with certain movements.      8/2/2023 Interval History of Present Illness: Cyril Carlton is a right-hand dominant 28 y.o. male who presents today with left shoulder pain.  Patient reports having shoulder pain for over 1 1/2 months.  He states that he noticed pain in the anterior shoulder after working out his shoulders, the following several days he felt like his shoulder was "out of place".  He has been attending physical therapy at [facility] with Shayne Brody PT and believes that he has improved but has reached a plateau.  He states that his pain is an intermittent pain that he rates at a 2/10 when present and that it is mainly located in the anterior shoulder region.  He reports that the pain is sharp in nature and that certain movements overhead usually "trigger" the pain.  He states that when sleeping on the left side he has noticed that his left arm goes to sleep so he is unable to sleep on that side any longer.  He states that he feels he only has about 80% of his strength back in his left arm.  He initially took OTC Advil and Tylenol but does not take anything at this time for his " symptoms.      The patient is active in none.  Occupation:  / [employer]      Past Medical History:   Past Medical History:   Diagnosis Date    Patient denies medical problems      Past Surgical History:   Procedure Laterality Date    NO PAST SURGERIES       Family History   Problem Relation Age of Onset    No Known Problems Mother     No Known Problems Father     No Known Problems Sister     Diabetes Maternal Grandmother     Diabetes Paternal Grandmother     Diabetes Paternal Grandfather      Social History     Socioeconomic History    Marital status: Single   Tobacco Use    Smoking status: Never    Smokeless tobacco: Never   Substance and Sexual Activity    Alcohol use: Yes     Alcohol/week: 2.0 standard drinks of alcohol     Types: 2 Cans of beer per week    Drug use: Never    Sexual activity: Yes     Partners: Female     Medication List with Changes/Refills   Current Medications    VENLAFAXINE (EFFEXOR-XR) 37.5 MG 24 HR CAPSULE    Take 1 capsule (37.5 mg total) by mouth once daily.     Review of patient's allergies indicates:  No Known Allergies    Physical Exam:   Body mass index is 34.36 kg/m².    GENERAL: Well appearing, in no acute distress.  HEAD: Normocephalic and atraumatic.  ENT: External ears and nose grossly normal.  EYES: EOMI bilaterally  PULMONARY: Respirations are grossly even and non-labored.  NEURO: Awake, alert, and oriented x 3.  SKIN: No obvious rashes appreciated.  PSYCH: Mood & affect are appropriate.    Detailed MSK exam:     Left shoulder exam:   -ROM: abduction 140, forward flexion 140, external rotation 90, internal rotation 70  -empty can test negative, resisted ER negative, belly press negative  -fuentes test negative, neers test negative, whipple test negative  -biceps load test negative, yerguson test negative, Oxford's test negative  -sensation intact, pulses 2+  -TTP: none    Right shoulder exam:   -ROM: abduction 140, forward flexion 140, external rotation 90, internal  rotation 70  -empty can test negative, resisted ER negative, belly press negative  -fuentes test positive, neers test negative, whipple test negative  -biceps load test negative, yerguson test negative, Creston's test negative  -sensation intact, pulses 2+  -TTP: none      Imaging:  X-ray Shoulder 2 or More Views Right  Narrative: EXAMINATION:  XR SHOULDER COMPLETE 2 OR MORE VIEWS RIGHT    CLINICAL HISTORY:  Pain in right shoulder    TECHNIQUE:  Two or three views of the right shoulder were preformed.    COMPARISON:  None    FINDINGS:  There is no radiographic evidence of acute osseous, articular, or soft tissue abnormality.  Joint spaces are preserved.  Impression: No acute findings    Electronically signed by: Joselo Araiza MD  Date:    09/13/2023  Time:    14:39        Relevant imaging results were reviewed and interpreted by me and per my read shows no acute abnormalities on shoulder radiographs.  This was discussed with the patient and / or family today.     Assessment:  Cyril Carlton is a 28 y.o. male presenting with right shoulder pain.   History, physical and radiographs are consistent with a likely diagnosis of impingement, possible minor muscle strain with over reassuring exam today.   Plan: continue PT, ice/heat, voltaren gel as needed. Consider steroid injection if not improving. Consider advanced imaging if not improving. Continue conservative management for pain.   Follow up 6 weeks. All questions answered.      Shoulder impingement    Strain of right shoulder, initial encounter               A copy of today's visit note has been sent to the referring provider.     Electronically signed:  Luis Tenorio MD, MPH  09/13/2023  12:01 PM

## 2023-09-14 ENCOUNTER — HOSPITAL ENCOUNTER (OUTPATIENT)
Dept: SLEEP MEDICINE | Facility: HOSPITAL | Age: 28
Discharge: HOME | End: 2023-09-14
Attending: FAMILY MEDICINE
Payer: OTHER GOVERNMENT

## 2023-09-14 DIAGNOSIS — R40.0 HAS DAYTIME DROWSINESS: ICD-10-CM

## 2023-09-14 DIAGNOSIS — R06.83 LOUD SNORING: ICD-10-CM

## 2023-09-14 PROCEDURE — 95800 SLP STDY UNATTENDED: CPT | Mod: 26,,, | Performed by: INTERNAL MEDICINE

## 2023-09-14 PROCEDURE — 95800 PR SLEEP STUDY, UNATTENDED, RECORD HEART RATE/O2 SAT/RESP ANAL/SLEEP TIME: ICD-10-PCS | Mod: 26,,, | Performed by: INTERNAL MEDICINE

## 2023-09-14 PROCEDURE — 95806 SLEEP STUDY UNATT&RESP EFFT: CPT | Performed by: INTERNAL MEDICINE

## 2023-09-14 NOTE — Clinical Note
PHYSICIAN INTERPRETATION AND COMMENTS: Clinically significant sleep disordered breathing is not identified, significant snoring is recorded. Additional testing may be required please refer to sleep Disorder Clinic CLINICAL HISTORY: 28 year old male presented with: 14 inch neck, BMI of 28.9, an [place] sleepiness score of 3, no comorbidities and symptoms of nocturnal snoring and witnessed apneas. Based on the clinical history, the patient has a low pre-test probability of having Mild LELE.

## 2023-09-18 ENCOUNTER — PATIENT MESSAGE (OUTPATIENT)
Dept: INTERNAL MEDICINE | Facility: CLINIC | Age: 28
End: 2023-09-18
Payer: OTHER GOVERNMENT

## 2023-09-18 PROBLEM — R06.83 LOUD SNORING: Status: ACTIVE | Noted: 2023-09-18

## 2023-09-18 NOTE — PROCEDURES
"PHYSICIAN INTERPRETATION AND COMMENTS: Clinically significant sleep disordered breathing is not identified,  significant snoring is recorded. Additional testing may be required please refer to sleep Disorder Clinic  CLINICAL HISTORY: 28 year old male presented with: 14 inch neck, BMI of 28.9, an [place] sleepiness score of 3, no comorbidities  and symptoms of nocturnal snoring and witnessed apneas. Based on the clinical history, the patient has a low  pre-test probability of having Mild LELE.  SLEEP STUDY FINDINGS: Patient underwent a 1 night Home Sleep Test and by behavioral criteria, slept for approximately  6.14 hours, with a sleep latency of 12 minutes and a sleep efficiency of 92%. The patient slept supine 16% of the night based  on valid recording time of 6.11 hours. Snoring occurs for 26.1% (30 dB) of the study, 19.8% is extremely loud. The mean pulse  rate is 70.1 BPM, with frequent pulse rate variability (54 events with >= 6 BPM increase/decrease per hour).  TREATMENT CONSIDERATIONS: Based on this study, treatment is not required at this time for obstructive sleep  apnea/hypopnea. Weight gain, alcohol consumption, and time spent sleeping supine can affect the severity of LELE. Future  testing should be considered as the patient's risk factors change.      Dear Lon Thomas MD  [address] [facility]  [place] [address]/Lon Thomas MD         The sleep study that you ordered is complete.  You have ordered sleep LAB services to perform the sleep study for Cyril Carlton .      Please find Sleep Study result in  the "Media tab" of Chart Review menu.        You can look  for the report in the  Media by the document type "Sleep Study Documents". Alphabetizing  "Document type" column helps to find the SLEEP STUDY report  Faster.       As the ordering provider, you are responsible for reviewing the results and implementing a treatment plan with your patient.    If you need a Sleep Medicine provider to " "explain the sleep study findings and arrange treatment for the patient, please refer patient for consultation to our Sleep Clinic via [facility] with Ambulatory Consult Sleep.     To do that please place an order for an  "Ambulatory Consult Sleep" -  order , it will go to our clinic work queue for our staff  to contact the patient for an appointment.      For any questions, please contact our sleep lab  staff at [phone] to talk to clinical staff          Jesús Marvin MD   "

## 2023-09-22 ENCOUNTER — CLINICAL SUPPORT (OUTPATIENT)
Dept: REHABILITATION | Facility: HOSPITAL | Age: 28
End: 2023-09-22
Payer: OTHER GOVERNMENT

## 2023-09-22 DIAGNOSIS — R53.1 DECREASED RANGE OF MOTION WITH DECREASED STRENGTH: ICD-10-CM

## 2023-09-22 DIAGNOSIS — M54.9 CHRONIC NECK AND BACK PAIN: Primary | ICD-10-CM

## 2023-09-22 DIAGNOSIS — M25.60 DECREASED RANGE OF MOTION WITH DECREASED STRENGTH: ICD-10-CM

## 2023-09-22 DIAGNOSIS — M54.2 CHRONIC NECK AND BACK PAIN: Primary | ICD-10-CM

## 2023-09-22 DIAGNOSIS — G89.29 CHRONIC NECK AND BACK PAIN: Primary | ICD-10-CM

## 2023-09-22 PROCEDURE — 97140 MANUAL THERAPY 1/> REGIONS: CPT

## 2023-09-22 PROCEDURE — 97110 THERAPEUTIC EXERCISES: CPT

## 2023-09-22 PROCEDURE — 97112 NEUROMUSCULAR REEDUCATION: CPT

## 2023-09-22 NOTE — PROGRESS NOTES
[facility]  Physical Therapy Treatment Note + Updated Plan of Care      Name: Cyril Carlton  Clinic Number: [id number]    Therapy Diagnosis:   Encounter Diagnoses   Name Primary?    Chronic neck and back pain Yes    Decreased range of motion with decreased strength      Physician: Lon Thomas MD  Visit Date: 9/22/2023  Physician Orders: PT Eval and Treat  Medical Diagnosis from Referral: Neck Pain   Evaluation Date: 12/21/2022  Authorization Period Expiration: 12/31/22  Plan of Care Expiration: 10/18/2023                          Progress Update: 09/22/2023                        FOTO: 0 / 3    Visit # / Visits authorized: 27 / 30 (+4 visits)                          PRECAUTIONS: Standard Precautions      Time In: 1100  Time Out: 1145  Total Billable Time: 40 minutes    SUBJECTIVE     Pt reports: improvement in discomfort in left shoulder and low back by 90%. Still having pain in full arm flexion.   Compliance with Hep: Daily  Response to previous treatment: no adverse reactions to treatment/updated HEP  Functional change: No Change    Pain: 2/10   Worst: 7/10  Location: low back  Pain Control: rest and stretching   Aggravating factors: overhead movements     OBJECTIVE     Objective Measures updated at progress report unless specified otherwise.     (Measurements performed on 09/22/23)  FUNCTIONAL SCREEN:     Upper Extremity ROM Details STRENGTH Strength  08/18/23 Details   Shoulder WNL  discomfort Grossly 4+/5 Grossly 5/5 Pain with abduction     Elbow WNL No Pain  Grossly 5/5 Grossly 5/5     Hand/Wrist WNL No pain  Grossly 5/5 Grossly 5/5        Gait Analysis: The patient ambulated with the following assistive device: none; the pt presents with the following gait abnormalities: Normal gait pattern.       Movement Analysis:   Functional Test  Outcome   Double Leg Squat WNL    Single Leg Step Down Trendelenburg sign bilaterally; pain limited patient's ability to perform activity        Treatment     Gym/Equipment Access: increase secondary to verbal cues   Time to Complete Exercises: Increase secondary to verbal/tactile cues     Cyril received therapeutic exercises to develop strength, endurance, ROM, flexibility, posture, and core stabilization for 10 minutes including:  UBE/ Bike x 7 minutes    Standing Rows x 3x10 7.5 lb cable    Prone Ws x 3x8                                Cyril received the following manual therapy techniques: Joint mobilizations, Manual traction, Myofacial release, Manual Lymphatic Drainage, Soft tissue Mobilization, and Friction Massage were applied to the: bilateral upper traps for 15 minutes, including:  Soft tissue x Upper traps/ scalenes/ supraspinatus/ QLs    Joint Mobs x Cervical side bend    FDN x Upper traps/ infraspinatus (B)                                 Cyril participated in neuromuscular re-education activities to improve: Balance, Coordination, Kinesthetic, Proprioception, and Posture for 15 minutes. The following activities were included:  Wall [place]  x    Upper trap stretch      Chin Tucks  x    Open Books  x 2x10 (B)   Glute bridges   30x   Bosu ball push ups  5x5   Rui press x 3x8 5lbs cable machine    Standing Swimmers  3x8 (B)   Home Exercises and Patient Education Provided     Education/Self-Care provided: (included in treatment) minutes   Patient educated on the impairments noted above and the effects of physical therapy intervention to improve overall condition and QOL.   Patient was educated on all the above exercise prior/during/after for proper posture, positioning, and execution for safe performance with home exercise program.  Exercise Prescription:   12/21/2022: EVAL- Low      Written Home Exercises Provided: yes. Prefers: Electronic Copies  Exercises were reviewed and Cyril was able to demonstrate them prior to the end of the session.  Cyril demonstrated good understanding of the education provided  See EMR under Patient Instructions for  exercises provided during therapy sessions.    ASSESSMENT     Cyril Carlton tolerated PT session well with minimal complaints discomfort, secondary to poor motor control and decrease muscle endurance. Objective findings are improving with measurements of ROM and functional mobility.Therapy exercises were reviewed by revisiting exercises given from previous home exercise program while adding more dynamic stability exercises. Handouts were not issued during today's visit. Cyril demonstrated good understanding of new exercises and will continue to progress at home until next follow-up.       Cyril Is progressing well towards his goals.   Pt prognosis is Excellent.     Pt will continue to benefit from skilled outpatient physical therapy to address the deficits listed in the problem list box on initial evaluation, provide pt/family education and to maximize pt's level of independence in the home and community environment.     Pt's spiritual, cultural and educational needs considered and pt agreeable to plan of care and goals.    Anticipated barriers to physical therapy: occupation    GOALS:  SHORT TERM GOALS: 4 weeks, (01/21/23) 03/22/23   Recent signs and systems trend is improving in order to progress towards LTG's.  PC   Patient will be independent with HEP in order to further progress and return to maximal function.  PC    Pain rating at Worst: 5/10 in order to progress towards increased independence with activity.  PC    Patient will be able to correct postural deviations in sitting and standing, to decrease pain and promote postural awareness for injury prevention.   PC       LONG TERM GOALS: 8  and continuing weeks, (02/21/23) 03/22/23   Patient will return to normal ADL, recreational, and work related activities with less pain and limitation.    PC   Patient will improve AROM to stated goals in order to return to maximal functional potential.    PC   Patient will improve Strength to stated goals of appropriate  musculature in order to improve functional independence.   PC    Pain Rating at Best: 1/10 to improve Quality of Life.   PC    Patient will meet predicted functional outcome (FOTO) score: TBA% to increase self-worth & perceived functional ability.  PC    Patient will have met/partially met personal goal of: being able to 20lbs overhead without pain   PC       PC = progressing/continue  PM= partially met  DC= discontinue    PLAN     Continue Plan of Care (POC) and progress per patient tolerance. See Treatment section for exercise progression.    Shayne Brody, PT, DPT

## 2023-10-06 ENCOUNTER — CLINICAL SUPPORT (OUTPATIENT)
Dept: REHABILITATION | Facility: HOSPITAL | Age: 28
End: 2023-10-06
Payer: OTHER GOVERNMENT

## 2023-10-06 DIAGNOSIS — M25.60 DECREASED RANGE OF MOTION WITH DECREASED STRENGTH: ICD-10-CM

## 2023-10-06 DIAGNOSIS — M54.2 CHRONIC NECK AND BACK PAIN: Primary | ICD-10-CM

## 2023-10-06 DIAGNOSIS — M54.9 CHRONIC NECK AND BACK PAIN: Primary | ICD-10-CM

## 2023-10-06 DIAGNOSIS — R53.1 DECREASED RANGE OF MOTION WITH DECREASED STRENGTH: ICD-10-CM

## 2023-10-06 DIAGNOSIS — G89.29 CHRONIC NECK AND BACK PAIN: Primary | ICD-10-CM

## 2023-10-06 PROCEDURE — 97110 THERAPEUTIC EXERCISES: CPT

## 2023-10-06 PROCEDURE — 97112 NEUROMUSCULAR REEDUCATION: CPT

## 2023-10-06 PROCEDURE — 97140 MANUAL THERAPY 1/> REGIONS: CPT

## 2023-10-06 NOTE — PROGRESS NOTES
[facility]  Physical Therapy Treatment Note      Name: Cyril Carlton  [facility] Number: [id number]    Therapy Diagnosis:   Encounter Diagnoses   Name Primary?    Chronic neck and back pain Yes    Decreased range of motion with decreased strength      Physician: Lon Thomas MD  Visit Date: 10/6/2023  Physician Orders: PT Eval and Treat  Medical Diagnosis from Referral: Neck Pain   Evaluation Date: 12/21/2022  Authorization Period Expiration: 12/31/22  Plan of Care Expiration: 10/18/2023                          Progress Update: 09/22/2023                        FOTO: 0 / 3    Visit # / Visits authorized: 28 / 45 (+4 visits)                          PRECAUTIONS: Standard Precautions      Time In: 0800  Time Out: 0845  Total Billable Time: 40 minutes    SUBJECTIVE     Pt reports: improvement in discomfort in left shoulder   Compliance with Hep: Daily  Response to previous treatment: no adverse reactions to treatment/updated HEP  Functional change: No Change    Pain: 2/10   Worst: 7/10  Location: low back  Pain Control: rest and stretching   Aggravating factors: overhead movements     OBJECTIVE     Objective Measures updated at progress report unless specified otherwise.    FUNCTIONAL SCREEN:     Upper Extremity ROM Details STRENGTH Strength  08/18/23 Details   Shoulder WNL  discomfort Grossly 4+/5 Grossly 5/5 Pain with abduction     Elbow WNL No Pain  Grossly 5/5 Grossly 5/5     Hand/Wrist WNL No pain  Grossly 5/5 Grossly 5/5        Gait Analysis: The patient ambulated with the following assistive device: none; the pt presents with the following gait abnormalities: Normal gait pattern.       Movement Analysis:   Functional Test  Outcome   Double Leg Squat WNL    Single Leg Step Down Trendelenburg sign bilaterally; pain limited patient's ability to perform activity       Treatment     Gym/Equipment Access: increase secondary to verbal cues   Time to Complete Exercises: Increase secondary  to verbal/tactile cues     Cyril received therapeutic exercises to develop strength, endurance, ROM, flexibility, posture, and core stabilization for 10 minutes including:  UBE/ Bike x 7 minutes    Standing Rows x 3x10 7.5 lb cable    Prone Ws x 3x8                                Cyril received the following manual therapy techniques: Joint mobilizations, Manual traction, Myofacial release, Manual Lymphatic Drainage, Soft tissue Mobilization, and Friction Massage were applied to the: bilateral upper traps for 15 minutes, including:  Soft tissue x Upper traps/ scalenes/ supraspinatus/ QLs    Joint Mobs x Cervical side bend    FDN x Upper traps/ infraspinatus (B)                                 Cyril participated in neuromuscular re-education activities to improve: Balance, Coordination, Kinesthetic, Proprioception, and Posture for 15 minutes. The following activities were included:  Wall [place]  x    Upper trap stretch      Chin Tucks  x    Open Books  x 2x10 (B)   Glute bridges   30x   Bosu ball push ups  5x5   Rui press x 3x8 5lbs cable machine    Standing Swimmers  3x8 (B)   Home Exercises and Patient Education Provided     Education/Self-Care provided: (included in treatment) minutes   Patient educated on the impairments noted above and the effects of physical therapy intervention to improve overall condition and QOL.   Patient was educated on all the above exercise prior/during/after for proper posture, positioning, and execution for safe performance with home exercise program.  Exercise Prescription:   12/21/2022: EVAL- Low      Written Home Exercises Provided: yes. Prefers: Electronic Copies  Exercises were reviewed and Cyril was able to demonstrate them prior to the end of the session.  Cyril demonstrated good understanding of the education provided  See EMR under Patient Instructions for exercises provided during therapy sessions.    ASSESSMENT     Cyril Carlton tolerated PT session well with minimal  complaints discomfort, secondary to poor motor control and decrease muscle endurance. Objective findings are improving with measurements of ROM and functional mobility.Therapy exercises were reviewed by revisiting exercises given from previous home exercise program while adding more dynamic stability exercises. Handouts were not issued during today's visit. Cyril demonstrated good understanding of new exercises and will continue to progress at home until next follow-up.       Cyril Is progressing well towards his goals.   Pt prognosis is Excellent.     Pt will continue to benefit from skilled outpatient physical therapy to address the deficits listed in the problem list box on initial evaluation, provide pt/family education and to maximize pt's level of independence in the home and community environment.     Pt's spiritual, cultural and educational needs considered and pt agreeable to plan of care and goals.    Anticipated barriers to physical therapy: occupation    GOALS:  SHORT TERM GOALS: 4 weeks, (01/21/23) 03/22/23   Recent signs and systems trend is improving in order to progress towards LTG's.  PC   Patient will be independent with HEP in order to further progress and return to maximal function.  PC    Pain rating at Worst: 5/10 in order to progress towards increased independence with activity.  PC    Patient will be able to correct postural deviations in sitting and standing, to decrease pain and promote postural awareness for injury prevention.   PC       LONG TERM GOALS: 8  and continuing weeks, (02/21/23) 03/22/23   Patient will return to normal ADL, recreational, and work related activities with less pain and limitation.    PC   Patient will improve AROM to stated goals in order to return to maximal functional potential.    PC   Patient will improve Strength to stated goals of appropriate musculature in order to improve functional independence.   PC    Pain Rating at Best: 1/10 to improve Quality of  Life.   PC    Patient will meet predicted functional outcome (FOTO) score: TBA% to increase self-worth & perceived functional ability.  PC    Patient will have met/partially met personal goal of: being able to 20lbs overhead without pain   PC       PC = progressing/continue  PM= partially met  DC= discontinue    PLAN     Continue Plan of Care (POC) and progress per patient tolerance. See Treatment section for exercise progression.    Shayne Brody, PT, DPT

## 2023-10-20 ENCOUNTER — CLINICAL SUPPORT (OUTPATIENT)
Dept: REHABILITATION | Facility: HOSPITAL | Age: 28
End: 2023-10-20
Payer: OTHER GOVERNMENT

## 2023-10-20 DIAGNOSIS — M54.2 CHRONIC NECK AND BACK PAIN: Primary | ICD-10-CM

## 2023-10-20 DIAGNOSIS — M54.9 CHRONIC NECK AND BACK PAIN: Primary | ICD-10-CM

## 2023-10-20 DIAGNOSIS — G89.29 CHRONIC NECK AND BACK PAIN: Primary | ICD-10-CM

## 2023-10-20 DIAGNOSIS — R53.1 DECREASED RANGE OF MOTION WITH DECREASED STRENGTH: ICD-10-CM

## 2023-10-20 DIAGNOSIS — M25.60 DECREASED RANGE OF MOTION WITH DECREASED STRENGTH: ICD-10-CM

## 2023-10-20 PROCEDURE — 97112 NEUROMUSCULAR REEDUCATION: CPT

## 2023-10-20 PROCEDURE — 97110 THERAPEUTIC EXERCISES: CPT

## 2023-10-20 PROCEDURE — 97140 MANUAL THERAPY 1/> REGIONS: CPT

## 2023-10-20 NOTE — PROGRESS NOTES
[facility]  Physical Therapy Treatment Note      Name: Cyril Carlton  [facility] Number: [id number]    Therapy Diagnosis:   Encounter Diagnoses   Name Primary?    Chronic neck and back pain Yes    Decreased range of motion with decreased strength      Physician: Lon Thomas MD  Visit Date: 10/20/2023  Physician Orders: PT Eval and Treat  Medical Diagnosis from Referral: Neck Pain   Evaluation Date: 12/21/2022  Authorization Period Expiration: 12/31/22  Plan of Care Expiration: 11/18/2023                          Progress Update: 10/22/2023                        FOTO: 0 / 3    Visit # / Visits authorized: 29 / 45 (+4 visits)                          PRECAUTIONS: Standard Precautions      Time In: 1230  Time Out: 1315  Total Billable Time: 40 minutes    SUBJECTIVE     Pt reports: improvement with overall mobility   Compliance with Hep: Daily  Response to previous treatment: no adverse reactions to treatment/updated HEP  Functional change: No Change    Pain: 2/10   Worst: 7/10  Location: low back  Pain Control: rest and stretching   Aggravating factors: overhead movements     OBJECTIVE     Objective Measures updated at progress report unless specified otherwise.  (Measurement performed 10/06/23)  FUNCTIONAL SCREEN:     Upper Extremity ROM Details STRENGTH Strength  08/18/23 Details   Shoulder WNL  discomfort Grossly 4+/5 Grossly 5/5 Pain with abduction     Elbow WNL No Pain  Grossly 5/5 Grossly 5/5     Hand/Wrist WNL No pain  Grossly 5/5 Grossly 5/5        Gait Analysis: The patient ambulated with the following assistive device: none; the pt presents with the following gait abnormalities: Normal gait pattern.       Movement Analysis:   Functional Test  Outcome   Double Leg Squat WNL    Single Leg Step Down Trendelenburg sign bilaterally; pain limited patient's ability to perform activity       Treatment     Gym/Equipment Access: increase secondary to verbal cues   Time to Complete  Exercises: Increase secondary to verbal/tactile cues     Cyril received therapeutic exercises to develop strength, endurance, ROM, flexibility, posture, and core stabilization for 10 minutes including:  UBE/ Bike x 7 minutes    Standing Rows x 3x10 7.5 lb cable    Prone Ws x 3x8                                Cyril received the following manual therapy techniques: Joint mobilizations, Manual traction, Myofacial release, Manual Lymphatic Drainage, Soft tissue Mobilization, and Friction Massage were applied to the: bilateral upper traps for 15 minutes, including:  Soft tissue x Upper traps/ scalenes/ supraspinatus/ QLs    Joint Mobs x Cervical side bend    FDN x Upper traps/ infraspinatus (B)                                 Cyril participated in neuromuscular re-education activities to improve: Balance, Coordination, Kinesthetic, Proprioception, and Posture for 15 minutes. The following activities were included:  Wall [place]  x    Upper trap stretch      Chin Tucks  x    Open Books  x 2x10 (B)   Glute bridges   30x   Bosu ball push ups  5x5   Bhut[place] press x 3x8 5lbs cable machine    Standing Swimmers  3x8 (B)   Home Exercises and Patient Education Provided     Education/Self-Care provided: (included in treatment) minutes   Patient educated on the impairments noted above and the effects of physical therapy intervention to improve overall condition and QOL.   Patient was educated on all the above exercise prior/during/after for proper posture, positioning, and execution for safe performance with home exercise program.  Exercise Prescription:   12/21/2022: EVAL- Low      Written Home Exercises Provided: yes. Prefers: Electronic Copies  Exercises were reviewed and Cyril was able to demonstrate them prior to the end of the session.  Cyril demonstrated good understanding of the education provided  See EMR under Patient Instructions for exercises provided during therapy sessions.    ASSESSMENT     Cyril Carlton tolerated PT  session well with minimal complaints discomfort, secondary to poor motor control and decrease muscle endurance. Objective findings are improving with measurements of ROM and functional mobility.Therapy exercises were reviewed by revisiting exercises given from previous home exercise program while adding more dynamic stability exercises. Handouts were not issued during today's visit. Cyril demonstrated good understanding of new exercises and will continue to progress at home until next follow-up.       Cyril Is progressing well towards his goals.   Pt prognosis is Excellent.     Pt will continue to benefit from skilled outpatient physical therapy to address the deficits listed in the problem list box on initial evaluation, provide pt/family education and to maximize pt's level of independence in the home and community environment.     Pt's spiritual, cultural and educational needs considered and pt agreeable to plan of care and goals.    Anticipated barriers to physical therapy: occupation    GOALS:  SHORT TERM GOALS: 4 weeks, (01/21/23) 03/22/23   Recent signs and systems trend is improving in order to progress towards LTG's.  PC   Patient will be independent with HEP in order to further progress and return to maximal function.  PC    Pain rating at Worst: 5/10 in order to progress towards increased independence with activity.  PC    Patient will be able to correct postural deviations in sitting and standing, to decrease pain and promote postural awareness for injury prevention.   PC       LONG TERM GOALS: 8  and continuing weeks, (02/21/23) 03/22/23   Patient will return to normal ADL, recreational, and work related activities with less pain and limitation.    PC   Patient will improve AROM to stated goals in order to return to maximal functional potential.    PC   Patient will improve Strength to stated goals of appropriate musculature in order to improve functional independence.   PC    Pain Rating at Best: 1/10  to improve Quality of Life.   PC    Patient will meet predicted functional outcome (FOTO) score: TBA% to increase self-worth & perceived functional ability.  PC    Patient will have met/partially met personal goal of: being able to 20lbs overhead without pain   PC       PC = progressing/continue  PM= partially met  DC= discontinue    PLAN     Continue Plan of Care (POC) and progress per patient tolerance. See Treatment section for exercise progression.    Shayne Brody, PT, DPT

## 2023-10-25 ENCOUNTER — OFFICE VISIT (OUTPATIENT)
Dept: SPORTS MEDICINE | Facility: CLINIC | Age: 28
End: 2023-10-25
Payer: OTHER GOVERNMENT

## 2023-10-25 VITALS — HEIGHT: 64 IN | WEIGHT: 200 LBS | BODY MASS INDEX: 34.15 KG/M2

## 2023-10-25 DIAGNOSIS — S43.431D SUPERIOR GLENOID LABRUM LESION OF RIGHT SHOULDER, SUBSEQUENT ENCOUNTER: Primary | ICD-10-CM

## 2023-10-25 DIAGNOSIS — M25.511 CHRONIC RIGHT SHOULDER PAIN: ICD-10-CM

## 2023-10-25 DIAGNOSIS — G89.29 CHRONIC RIGHT SHOULDER PAIN: ICD-10-CM

## 2023-10-25 PROCEDURE — 99999 PR PBB SHADOW E&M-EST. PATIENT-LVL IV: CPT | Mod: PBBFAC,,, | Performed by: STUDENT IN AN ORGANIZED HEALTH CARE EDUCATION/TRAINING PROGRAM

## 2023-10-25 PROCEDURE — 99214 PR OFFICE/OUTPT VISIT, EST, LEVL IV, 30-39 MIN: ICD-10-PCS | Mod: S$PBB,,, | Performed by: STUDENT IN AN ORGANIZED HEALTH CARE EDUCATION/TRAINING PROGRAM

## 2023-10-25 PROCEDURE — 99214 OFFICE O/P EST MOD 30 MIN: CPT | Mod: PBBFAC | Performed by: STUDENT IN AN ORGANIZED HEALTH CARE EDUCATION/TRAINING PROGRAM

## 2023-10-25 PROCEDURE — 99214 OFFICE O/P EST MOD 30 MIN: CPT | Mod: S$PBB,,, | Performed by: STUDENT IN AN ORGANIZED HEALTH CARE EDUCATION/TRAINING PROGRAM

## 2023-10-25 PROCEDURE — 99999 PR PBB SHADOW E&M-EST. PATIENT-LVL IV: ICD-10-PCS | Mod: PBBFAC,,, | Performed by: STUDENT IN AN ORGANIZED HEALTH CARE EDUCATION/TRAINING PROGRAM

## 2023-10-25 NOTE — PATIENT INSTRUCTIONS
Assessment:  Cyril Carlton is a 28 y.o. male   Chief Complaint   Patient presents with    Right Shoulder - Pain    Follow-up       Encounter Diagnoses   Name Primary?    Chronic right shoulder pain     Superior glenoid labrum lesion of right shoulder, subsequent encounter Yes        Plan:  MR Arthrogram of the right shoulder   Continue with physical therapy add labral tear to referral  Follow up after MRI    Follow-up: after MRI or sooner if there are any problems between now and then.    Thank you for choosing [facility] and Dr. Luis Tenorio for your orthopedic & sports medicine care. It is our goal to provide you with exceptional care that will help keep you healthy, active, and get you back in the game.    Please do not hesitate to reach out to us via email, phone, or MyChart with any questions, concerns, or feedback.    If you felt that you received exemplary care today, please consider leaving us feedback on [email or web address] at:  [email or web address]    If you are experiencing pain/discomfort ,or have questions after 5pm and would like to be connected to the [facility] on-call team, please call this number and specify which Sports Medicine provider is treating you: [phone]

## 2023-10-25 NOTE — PROGRESS NOTES
Patient ID: Cyril Carlton  YOB: 1995  MRN: [id number]    Chief Complaint: Pain of the Right Shoulder and Follow-up      Referred By: self    History of Present Illness: Cyril Carlton is a right-hand dominant 28 y.o. male who presents today followup right shoulder.  He states that he still has pain after working out and playing basketball..  He has been attending physical therapy at [facility]  for both shoulders with Shayne Brody PT. He says that he doesn't think PT is helping anymore. He states that ROM is dreased and not getting any better. Pain (3/10 pain scale)      The patient is active in basketball  Occupation: Active     Past Medical History:   Past Medical History:   Diagnosis Date    Patient denies medical problems      Past Surgical History:   Procedure Laterality Date    NO PAST SURGERIES       Family History   Problem Relation Age of Onset    No Known Problems Mother     No Known Problems Father     No Known Problems Sister     Diabetes Maternal Grandmother     Diabetes Paternal Grandmother     Diabetes Paternal Grandfather      Social History     Socioeconomic History    Marital status: Single   Tobacco Use    Smoking status: Never    Smokeless tobacco: Never   Substance and Sexual Activity    Alcohol use: Yes     Alcohol/week: 2.0 standard drinks of alcohol     Types: 2 Cans of beer per week    Drug use: Never    Sexual activity: Yes     Partners: Female     Medication List with Changes/Refills   Current Medications    VENLAFAXINE (EFFEXOR-XR) 37.5 MG 24 HR CAPSULE    Take 1 capsule (37.5 mg total) by mouth once daily.     Review of patient's allergies indicates:  No Known Allergies    Physical Exam:   Body mass index is 34.33 kg/m².    GENERAL: Well appearing, in no acute distress.  HEAD: Normocephalic and atraumatic.  ENT: External ears and nose grossly normal.  EYES: EOMI bilaterally  PULMONARY: Respirations are grossly even and non-labored.  NEURO: Awake, alert, and  oriented x 3.  SKIN: No obvious rashes appreciated.  PSYCH: Mood & affect are appropriate.    Detailed MSK exam:     Right shoulder exam:   -ROM: abduction 140, forward flexion 140, external rotation 90, internal rotation 80  -empty can test negative, resisted ER negative, belly press negative  -fuentes test negative, neers test negative, whipple test negative  -biceps load test negative, yerguson test negative, Broomfield's test positive  -sensation intact, pulses 2+  -TTP: none    Left shoulder exam:   -ROM: abduction 140, forward flexion 140, external rotation 90, internal rotation 80  -empty can test negative, resisted ER negative, belly press negative  -fuentes test negative, neers test negative, whipple test negative  -biceps load test negative, yerguson test negative, Broomfield's test negative  -sensation intact, pulses 2+  -TTP: none      Imaging:  X-ray Shoulder 2 or More Views Right  Narrative: EXAMINATION:  XR SHOULDER COMPLETE 2 OR MORE VIEWS RIGHT    CLINICAL HISTORY:  Pain in right shoulder    TECHNIQUE:  Two or three views of the right shoulder were preformed.    COMPARISON:  None    FINDINGS:  There is no radiographic evidence of acute osseous, articular, or soft tissue abnormality.  Joint spaces are preserved.  Impression: No acute findings    Electronically signed by: Joselo Araiza MD  Date:    09/13/2023  Time:    14:39        Relevant imaging results were reviewed and interpreted by me and per my read shows no acute abnormalities on radiographs.  This was discussed with the patient and / or family today.     Assessment:  Cyril Carlton is a 28 y.o. male following up for chronic right shoulder pain and stiffness.   History, physical and radiographs are consistent with a likely diagnosis of possible labral tear.   Plan: MR arthrogram ordered. Continue PT. Continue conservative management for pain.   Follow up after MRI to go over results and determine next steps in management. All questions answered.       Superior glenoid labrum lesion of right shoulder, subsequent encounter  -     MRI Arthrogram Shoulder With Contrast Right; Future; Expected date: 10/25/2023  -     XR ARTHROGRAM SHOULDER RIGHT, INJECTION ONLY WITH MRI TO FOLLOW (XPD); Future; Expected date: 10/25/2023  -     Ambulatory referral/consult to Physical/Occupational Therapy; Future; Expected date: 11/01/2023    Chronic right shoulder pain  -     MRI Arthrogram Shoulder With Contrast Right; Future; Expected date: 10/25/2023  -     Ambulatory referral/consult to Physical/Occupational Therapy; Future; Expected date: 11/01/2023             A copy of today's visit note has been sent to the referring provider.     Electronically signed:  Luis Tenorio MD, MPH  10/25/2023  2:01 PM

## 2023-11-17 ENCOUNTER — CLINICAL SUPPORT (OUTPATIENT)
Dept: REHABILITATION | Facility: HOSPITAL | Age: 28
End: 2023-11-17
Payer: OTHER GOVERNMENT

## 2023-11-17 DIAGNOSIS — G89.29 CHRONIC NECK AND BACK PAIN: Primary | ICD-10-CM

## 2023-11-17 DIAGNOSIS — M25.60 DECREASED RANGE OF MOTION WITH DECREASED STRENGTH: ICD-10-CM

## 2023-11-17 DIAGNOSIS — R53.1 DECREASED RANGE OF MOTION WITH DECREASED STRENGTH: ICD-10-CM

## 2023-11-17 DIAGNOSIS — M54.9 CHRONIC NECK AND BACK PAIN: Primary | ICD-10-CM

## 2023-11-17 DIAGNOSIS — M54.2 CHRONIC NECK AND BACK PAIN: Primary | ICD-10-CM

## 2023-11-17 PROCEDURE — 97110 THERAPEUTIC EXERCISES: CPT

## 2023-11-17 PROCEDURE — 97140 MANUAL THERAPY 1/> REGIONS: CPT

## 2023-11-17 PROCEDURE — 97112 NEUROMUSCULAR REEDUCATION: CPT

## 2023-11-19 ENCOUNTER — PATIENT MESSAGE (OUTPATIENT)
Dept: INTERNAL MEDICINE | Facility: CLINIC | Age: 28
End: 2023-11-19
Payer: OTHER GOVERNMENT

## 2023-11-22 ENCOUNTER — IMMUNIZATION (OUTPATIENT)
Dept: FAMILY MEDICINE | Facility: CLINIC | Age: 28
End: 2023-11-22
Payer: OTHER GOVERNMENT

## 2023-11-22 DIAGNOSIS — Z23 NEED FOR VACCINATION: Primary | ICD-10-CM

## 2023-11-22 PROCEDURE — 99999PBSHW FLU VACCINE (QUAD) GREATER THAN OR EQUAL TO 3YO PRESERVATIVE FREE IM: Mod: PBBFAC,,,

## 2023-11-22 PROCEDURE — 99999PBSHW FLU VACCINE (QUAD) GREATER THAN OR EQUAL TO 3YO PRESERVATIVE FREE IM: ICD-10-PCS | Mod: PBBFAC,,,

## 2023-11-22 PROCEDURE — 90471 IMMUNIZATION ADMIN: CPT | Mod: PBBFAC,PO

## 2023-11-26 NOTE — PROGRESS NOTES
[facility]  Physical Therapy Treatment Note      Name: Cyril Carlton  [facility] Number: [id number]    Therapy Diagnosis:   Encounter Diagnoses   Name Primary?    Chronic neck and back pain Yes    Decreased range of motion with decreased strength      Physician: Lon Thomas MD  Visit Date: 11/17/2023  Physician Orders: PT Eval and Treat  Medical Diagnosis from Referral: Neck Pain   Evaluation Date: 12/21/2022  Authorization Period Expiration: 12/31/22  Plan of Care Expiration: 11/18/2023                          Progress Update: 10/22/2023                        FOTO: 0 / 3    Visit # / Visits authorized: 30 / 45 (+4 visits)                          PRECAUTIONS: Standard Precautions      Time In: 1145  Time Out: 1230  Total Billable Time: 40 minutes    SUBJECTIVE     Pt reports: improvement with overall mobility   Compliance with Hep: Daily  Response to previous treatment: no adverse reactions to treatment/updated HEP  Functional change: No Change    Pain: 2/10   Worst: 7/10  Location: low back  Pain Control: rest and stretching   Aggravating factors: overhead movements     OBJECTIVE     Objective Measures updated at progress report unless specified otherwise.    (Measurement performed 11/17/23)  FUNCTIONAL SCREEN:     Upper Extremity ROM Details STRENGTH Strength  08/18/23 Details   Shoulder WNL  discomfort Grossly 5/5 Grossly 5/5 Pain with abduction     Elbow WNL No Pain  Grossly 5/5 Grossly 5/5     Hand/Wrist WNL No pain  Grossly 5/5 Grossly 5/5        Gait Analysis: The patient ambulated with the following assistive device: none; the pt presents with the following gait abnormalities: decrease stride length in left side      Movement Analysis:   Functional Test  Outcome   Double Leg Squat WNL    Single Leg Step Down Trendelenburg sign bilaterally; pain limited patient's ability to perform activity       Treatment     Gym/Equipment Access: increase secondary to verbal cues   Time to  Complete Exercises: Increase secondary to verbal/tactile cues     Cyril received therapeutic exercises to develop strength, endurance, ROM, flexibility, posture, and core stabilization for 10 minutes including:  UBE/ Bike x 7 minutes    Standing Rows x 3x10 7.5 lb cable    Prone Ws x 3x8                                Cyril received the following manual therapy techniques: Joint mobilizations, Manual traction, Myofacial release, Manual Lymphatic Drainage, Soft tissue Mobilization, and Friction Massage were applied to the: bilateral upper traps for 15 minutes, including:  Soft tissue x Upper traps/ scalenes/ supraspinatus/ QLs    Joint Mobs x Cervical side bend    FDN x Upper traps/ infraspinatus (B)                                 Cyril participated in neuromuscular re-education activities to improve: Balance, Coordination, Kinesthetic, Proprioception, and Posture for 15 minutes. The following activities were included:  Wall [place]  x    Upper trap stretch      Chin Tucks  x    Open Books  x 2x10 (B)   Glute bridges   30x   Bosu ball push ups  5x5   [place] press x 3x8 5lbs cable machine    Standing Swimmers  3x8 (B)   Home Exercises and Patient Education Provided     Education/Self-Care provided: (included in treatment) minutes   Patient educated on the impairments noted above and the effects of physical therapy intervention to improve overall condition and QOL.   Patient was educated on all the above exercise prior/during/after for proper posture, positioning, and execution for safe performance with home exercise program.  Exercise Prescription:   12/21/2022: EVAL- Low      Written Home Exercises Provided: yes. Prefers: Electronic Copies  Exercises were reviewed and Cyril was able to demonstrate them prior to the end of the session.  Cyril demonstrated good understanding of the education provided  See EMR under Patient Instructions for exercises provided during therapy sessions.    ASSESSMENT     Cyril Carlton  tolerated PT session well with minimal complaints discomfort, secondary to poor motor control and decrease muscle endurance. Objective findings are improving with measurements of ROM and functional mobility.Therapy exercises were reviewed by revisiting exercises given from previous home exercise program while adding more dynamic stability exercises. Handouts were not issued during today's visit. Cyril demonstrated good understanding of new exercises and will continue to progress at home until next follow-up.       Cyril Is progressing well towards his goals.   Pt prognosis is Excellent.     Pt will continue to benefit from skilled outpatient physical therapy to address the deficits listed in the problem list box on initial evaluation, provide pt/family education and to maximize pt's level of independence in the home and community environment.     Pt's spiritual, cultural and educational needs considered and pt agreeable to plan of care and goals.    Anticipated barriers to physical therapy: occupation    GOALS:  SHORT TERM GOALS: 4 weeks, (01/21/23) 03/22/23   Recent signs and systems trend is improving in order to progress towards LTG's.  PC   Patient will be independent with HEP in order to further progress and return to maximal function.  PC    Pain rating at Worst: 5/10 in order to progress towards increased independence with activity.  PC    Patient will be able to correct postural deviations in sitting and standing, to decrease pain and promote postural awareness for injury prevention.   PC       LONG TERM GOALS: 8  and continuing weeks, (02/21/23) 03/22/23   Patient will return to normal ADL, recreational, and work related activities with less pain and limitation.    PC   Patient will improve AROM to stated goals in order to return to maximal functional potential.    PC   Patient will improve Strength to stated goals of appropriate musculature in order to improve functional independence.   PC    Pain Rating  at Best: 1/10 to improve Quality of Life.   PC    Patient will meet predicted functional outcome (FOTO) score: TBA% to increase self-worth & perceived functional ability.  PC    Patient will have met/partially met personal goal of: being able to 20lbs overhead without pain   PC       PC = progressing/continue  PM= partially met  DC= discontinue    PLAN     Continue Plan of Care (POC) and progress per patient tolerance. See Treatment section for exercise progression.    Shayne Brody, PT, DPT

## 2023-11-27 ENCOUNTER — OFFICE VISIT (OUTPATIENT)
Dept: INTERNAL MEDICINE | Facility: CLINIC | Age: 28
End: 2023-11-27
Payer: OTHER GOVERNMENT

## 2023-11-27 DIAGNOSIS — Z11.3 SCREENING FOR STD (SEXUALLY TRANSMITTED DISEASE): Primary | ICD-10-CM

## 2023-11-27 DIAGNOSIS — Z20.2 POTENTIAL EXPOSURE TO STD: ICD-10-CM

## 2023-11-27 PROBLEM — R06.83 LOUD SNORING: Status: RESOLVED | Noted: 2023-09-18 | Resolved: 2023-11-27

## 2023-11-27 PROCEDURE — 99212 PR OFFICE/OUTPT VISIT, EST, LEVL II, 10-19 MIN: ICD-10-PCS | Mod: 95,,, | Performed by: FAMILY MEDICINE

## 2023-11-27 PROCEDURE — 99212 OFFICE O/P EST SF 10 MIN: CPT | Mod: 95,,, | Performed by: FAMILY MEDICINE

## 2023-11-27 RX ORDER — FLUOXETINE 10 MG/1
10 CAPSULE ORAL EVERY MORNING
COMMUNITY
Start: 2023-09-29 | End: 2023-11-27

## 2023-11-27 NOTE — PROGRESS NOTES
Subjective:   Patient ID: Cyril Carlton is a 28 y.o. male.    The patient location is: [place]  The chief complaint leading to consultation is: STD Testing    Visit type: audiovisual    Face to Face time with patient: 5 minutes  10 minutes of total time spent on the encounter, which includes face to face time and non-face to face time preparing to see the patient (eg, review of tests), Obtaining and/or reviewing separately obtained history, Documenting clinical information in the electronic or other health record, Independently interpreting results (not separately reported) and communicating results to the patient/family/caregiver, or Care coordination (not separately reported).     Each patient to whom he or she provides medical services by telemedicine is:  (1) informed of the relationship between the physician and patient and the respective role of any other health care provider with respect to management of the patient; and (2) notified that he or she may decline to receive medical services by telemedicine and may withdraw from such care at any time.    Last visit August 2023 for annual physical exam  CBC with normal white blood cell count, red blood cell count, and platelet levels.  Sugar, Kidney, Liver, and Electrolyte tests are all normal.  Cholesterol tests are normal. 10-year risk of a heart attack or stroke is low. Aspirin or Statin cholesterol medications are not recommended.  A1c is in a normal range. No Prediabtes or Diabetes  Thyroid testing is normal.  HIV test negative  Hepatitis C test is negative.  Syphilis test negative  Gonorrhea and chlamydia testing negative    Also underwent screening for STDs in February 2023  HIV test negative  Hepatitis C test is negative.  Syphilis test negative  Gonorrhea and chlamydia testing negative    Concerned because potential recent exposure to unknown STD   Denies any current active symptoms  Would like to be retested    Review of Systems   Constitutional:   Negative for activity change and unexpected weight change.   HENT:  Negative for hearing loss, rhinorrhea and trouble swallowing.    Eyes:  Negative for discharge and visual disturbance.   Respiratory:  Negative for chest tightness and wheezing.    Cardiovascular:  Negative for chest pain and palpitations.   Gastrointestinal:  Negative for blood in stool, constipation, diarrhea and vomiting.   Endocrine: Negative for polydipsia and polyuria.   Genitourinary:  Negative for difficulty urinating, hematuria and urgency.   Musculoskeletal:  Negative for arthralgias, joint swelling and neck pain.   Neurological:  Negative for weakness and headaches.   Psychiatric/Behavioral:  Negative for confusion and dysphoric mood.      Objective:     Physical Exam  Constitutional:       Appearance: Normal appearance.   Psychiatric:         Mood and Affect: Mood and affect normal.       Assessment:       ICD-10-CM ICD-9-CM   1. Screening for STD (sexually transmitted disease)  Z11.3 V74.5   2. Potential exposure to STD  Z20.2 V01.6     Plan:   Screening for STD (sexually transmitted disease)  Potential exposure to STD  -     C. trachomatis/N. gonorrhoeae by AMP DNA; Future; Expected date: 11/27/2023  -     HIV 1/2 Ag/Ab (4th Gen); Future; Expected date: 11/27/2023  -     Hepatitis C Antibody; Future; Expected date: 11/27/2023  -     RPR; Future; Expected date: 11/27/2023    Labs as above, treat as indicated.  Discussed safe sex practices to minimize risk of STD exposure.  Return to clinic 9 months for annual physical exam or sooner as needed

## 2023-11-28 ENCOUNTER — LAB VISIT (OUTPATIENT)
Dept: LAB | Facility: HOSPITAL | Age: 28
End: 2023-11-28
Payer: OTHER GOVERNMENT

## 2023-11-28 ENCOUNTER — HOSPITAL ENCOUNTER (OUTPATIENT)
Dept: RADIOLOGY | Facility: HOSPITAL | Age: 28
Discharge: HOME | End: 2023-11-28
Attending: STUDENT IN AN ORGANIZED HEALTH CARE EDUCATION/TRAINING PROGRAM
Payer: OTHER GOVERNMENT

## 2023-11-28 DIAGNOSIS — Z11.3 SCREENING FOR STD (SEXUALLY TRANSMITTED DISEASE): ICD-10-CM

## 2023-11-28 DIAGNOSIS — Z20.2 POTENTIAL EXPOSURE TO STD: ICD-10-CM

## 2023-11-28 DIAGNOSIS — S43.431D SUPERIOR GLENOID LABRUM LESION OF RIGHT SHOULDER, SUBSEQUENT ENCOUNTER: ICD-10-CM

## 2023-11-28 DIAGNOSIS — M25.511 CHRONIC RIGHT SHOULDER PAIN: ICD-10-CM

## 2023-11-28 DIAGNOSIS — G89.29 CHRONIC RIGHT SHOULDER PAIN: ICD-10-CM

## 2023-11-28 PROCEDURE — 87491 CHLMYD TRACH DNA AMP PROBE: CPT | Performed by: FAMILY MEDICINE

## 2023-11-28 PROCEDURE — 25500020 PHARM REV CODE 255: Performed by: STUDENT IN AN ORGANIZED HEALTH CARE EDUCATION/TRAINING PROGRAM

## 2023-11-28 PROCEDURE — 73040 XR ARTHROGRAM SHOULDER RIGHT, COMPLETE (XPD): ICD-10-PCS | Mod: 26,RT,, | Performed by: RADIOLOGY

## 2023-11-28 PROCEDURE — A9585 GADOBUTROL INJECTION: HCPCS | Performed by: STUDENT IN AN ORGANIZED HEALTH CARE EDUCATION/TRAINING PROGRAM

## 2023-11-28 PROCEDURE — 73222 MRI JOINT UPR EXTREM W/DYE: CPT | Mod: 26,RT,, | Performed by: RADIOLOGY

## 2023-11-28 PROCEDURE — 23350 INJECTION FOR SHOULDER X-RAY: CPT | Mod: RT,,, | Performed by: RADIOLOGY

## 2023-11-28 PROCEDURE — 87591 N.GONORRHOEAE DNA AMP PROB: CPT | Performed by: FAMILY MEDICINE

## 2023-11-28 PROCEDURE — 23350 XR ARTHROGRAM SHOULDER RIGHT, COMPLETE (XPD): ICD-10-PCS | Mod: RT,,, | Performed by: RADIOLOGY

## 2023-11-28 PROCEDURE — 73222 MRI ARTHROGRAM SHOULDER WITH CONTRAST RIGHT: ICD-10-PCS | Mod: 26,RT,, | Performed by: RADIOLOGY

## 2023-11-28 PROCEDURE — 73222 MRI JOINT UPR EXTREM W/DYE: CPT | Mod: TC,RT

## 2023-11-28 PROCEDURE — 73040 CONTRAST X-RAY OF SHOULDER: CPT | Mod: TC,RT

## 2023-11-28 PROCEDURE — 73040 CONTRAST X-RAY OF SHOULDER: CPT | Mod: 26,RT,, | Performed by: RADIOLOGY

## 2023-11-28 RX ORDER — GADOBUTROL 604.72 MG/ML
0.1 INJECTION INTRAVENOUS
Status: COMPLETED | OUTPATIENT
Start: 2023-11-28 | End: 2023-11-28

## 2023-11-28 RX ADMIN — IOHEXOL 10 ML: 350 INJECTION, SOLUTION INTRAVENOUS at 10:11

## 2023-11-28 RX ADMIN — GADOBUTROL 0.1 ML: 604.72 INJECTION INTRAVENOUS at 10:11

## 2023-11-29 ENCOUNTER — OFFICE VISIT (OUTPATIENT)
Dept: SPORTS MEDICINE | Facility: CLINIC | Age: 28
End: 2023-11-29
Payer: OTHER GOVERNMENT

## 2023-11-29 VITALS — HEIGHT: 64 IN | BODY MASS INDEX: 34.13 KG/M2 | WEIGHT: 199.94 LBS

## 2023-11-29 DIAGNOSIS — G89.29 CHRONIC RIGHT SHOULDER PAIN: ICD-10-CM

## 2023-11-29 DIAGNOSIS — G89.29 CHRONIC RIGHT SHOULDER PAIN: Primary | ICD-10-CM

## 2023-11-29 DIAGNOSIS — M25.511 CHRONIC RIGHT SHOULDER PAIN: ICD-10-CM

## 2023-11-29 DIAGNOSIS — S43.431D SUPERIOR GLENOID LABRUM LESION OF RIGHT SHOULDER, SUBSEQUENT ENCOUNTER: Primary | ICD-10-CM

## 2023-11-29 DIAGNOSIS — M25.511 CHRONIC RIGHT SHOULDER PAIN: Primary | ICD-10-CM

## 2023-11-29 LAB
C TRACH DNA SPEC QL NAA+PROBE: NOT DETECTED
N GONORRHOEA DNA SPEC QL NAA+PROBE: NOT DETECTED

## 2023-11-29 PROCEDURE — 99214 OFFICE O/P EST MOD 30 MIN: CPT | Mod: S$PBB,,, | Performed by: STUDENT IN AN ORGANIZED HEALTH CARE EDUCATION/TRAINING PROGRAM

## 2023-11-29 PROCEDURE — 99214 PR OFFICE/OUTPT VISIT, EST, LEVL IV, 30-39 MIN: ICD-10-PCS | Mod: S$PBB,,, | Performed by: STUDENT IN AN ORGANIZED HEALTH CARE EDUCATION/TRAINING PROGRAM

## 2023-11-29 PROCEDURE — 99999 PR PBB SHADOW E&M-EST. PATIENT-LVL III: CPT | Mod: PBBFAC,,, | Performed by: STUDENT IN AN ORGANIZED HEALTH CARE EDUCATION/TRAINING PROGRAM

## 2023-11-29 PROCEDURE — 99999 PR PBB SHADOW E&M-EST. PATIENT-LVL III: ICD-10-PCS | Mod: PBBFAC,,, | Performed by: STUDENT IN AN ORGANIZED HEALTH CARE EDUCATION/TRAINING PROGRAM

## 2023-11-29 PROCEDURE — 99213 OFFICE O/P EST LOW 20 MIN: CPT | Mod: PBBFAC | Performed by: STUDENT IN AN ORGANIZED HEALTH CARE EDUCATION/TRAINING PROGRAM

## 2023-11-29 NOTE — PROGRESS NOTES
Patient ID: Cyril Carlton  YOB: 1995  MRN: [id number]    Chief Complaint: Pain of the Right Shoulder (MRI review)      Referred By: self        History of Present Illness: Cyril Carlton is a right-hand dominant 28 y.o. male who presents today followup right shoulder, MRI review. He reports no changes in symptoms. No pain today, he only has pain when lifting.        10/25/2023 Interval History of Present Illness: Cyril Carlton is a right-hand dominant 28 y.o. male who presents today followup right shoulder.  He states that he still has pain after working out and playing basketball..  He has been attending physical therapy at [facility]  for both shoulders with Shayne Brody PT. He says that he doesn't think PT is helping anymore. He states that ROM is dreased and not getting any better. Pain (3/10 pain scale)      The patient is active in basketball  Occupation: Active     Past Medical History:   Past Medical History:   Diagnosis Date    Patient denies medical problems      Past Surgical History:   Procedure Laterality Date    NO PAST SURGERIES       Family History   Problem Relation Age of Onset    No Known Problems Mother     No Known Problems Father     No Known Problems Sister     Diabetes Maternal Grandmother     Diabetes Paternal Grandmother     Diabetes Paternal Grandfather      Social History     Socioeconomic History    Marital status: Single   Tobacco Use    Smoking status: Never    Smokeless tobacco: Never   Substance and Sexual Activity    Alcohol use: Yes     Alcohol/week: 2.0 standard drinks of alcohol     Types: 2 Cans of beer per week    Drug use: Never    Sexual activity: Yes     Partners: Female     Social Determinants of Health     Financial Resource Strain: Low Risk  (11/27/2023)    Overall Financial Resource Strain (CARDIA)     Difficulty of Paying Living Expenses: Not hard at all   Food Insecurity: No Food Insecurity (11/27/2023)    Hunger Vital Sign     Worried About  Running Out of Food in the Last Year: Never true     Ran Out of Food in the Last Year: Never true   Transportation Needs: No Transportation Needs (11/27/2023)    PRAPARE - Transportation     Lack of Transportation (Medical): No     Lack of Transportation (Non-Medical): No   Physical Activity: Sufficiently Active (11/27/2023)    Exercise Vital Sign     Days of Exercise per Week: 5 days     Minutes of Exercise per Session: 60 min   Stress: No Stress Concern Present (11/27/2023)    [employer] [place] [employer] - Occupational Stress Questionnaire     Feeling of Stress : Not at all   Social Connections: Unknown (11/27/2023)    Social Connection and Isolation Panel [NHANES]     Frequency of Communication with Friends and Family: Twice a week     Frequency of Social Gatherings with Friends and Family: Once a week     Active Member of Clubs or Organizations: Yes     Attends Club or Organization Meetings: More than 4 times per year     Marital Status: Never    Housing Stability: Low Risk  (11/27/2023)    Housing Stability Vital Sign     Unable to Pay for Housing in the Last Year: No     Number of Places Lived in the Last Year: 1     Unstable Housing in the Last Year: No         Review of patient's allergies indicates:  No Known Allergies    Physical Exam:   Body mass index is 34.32 kg/m².    GENERAL: Well appearing, in no acute distress.  HEAD: Normocephalic and atraumatic.  ENT: External ears and nose grossly normal.  EYES: EOMI bilaterally  PULMONARY: Respirations are grossly even and non-labored.  NEURO: Awake, alert, and oriented x 3.  SKIN: No obvious rashes appreciated.  PSYCH: Mood & affect are appropriate.    Detailed MSK exam:     Right shoulder exam:   -ROM: abduction 140, forward flexion 140, external rotation 90, internal rotation 80  -empty can test negative, resisted ER negative, belly press negative  -fuentes test negative, neers test negative, whipple test negative  -biceps load test  "negative, yerguson test negative, [place]'s test positive  -sensation intact, pulses 2+  -TTP: none    Left shoulder exam:   -ROM: abduction 140, forward flexion 140, external rotation 90, internal rotation 80  -empty can test negative, resisted ER negative, belly press negative  -fuentes test negative, neers test negative, whipple test negative  -biceps load test negative, yerguson test negative, Dar[place]'s test negative  -sensation intact, pulses 2+  -TTP: none      Imaging:  MRI Arthrogram Shoulder With Contrast Right  Narrative: EXAMINATION:  MRI ARTHROGRAM SHOULDER WITH CONTRAST RIGHT    CLINICAL HISTORY:  Shoulder pain, labral tear suspected, xray done;  Pain in right shoulder    TECHNIQUE:  MRI of the shoulder was performed after administration of intra-articular contrast utilizing the following sequences: Axial, sagittal and coronal T1 FS; coronal and sagittal T2 FS.    COMPARISON:  None.    FINDINGS:  Rotator cuff: Supraspinatus, infraspinatus, teres minor, and subscapularis tendons are intact.  Muscle bulk is maintained.    Labrum: Glenoid labrum is intact.    Biceps: Long head biceps tendon is intact.    Bone: No fracture.  Bone marrow signal is unremarkable.    Acromioclavicular joint: The AC joint is unremarkable.    Cartilage: Articular cartilage of the glenohumeral joint is preserved.  Impression: No labral tear or acute abnormality appreciated.    Electronically signed by: Bruce Gonzáles MD  Date:    11/28/2023  Time:    11:12  X-Ray Arthrogram Shoulder Right, COMPLETE (XPD)  Narrative: EXAMINATION:  XR ARTHROGRAM SHOULDER RIGHT, COMPLETE (XPD)    CLINICAL HISTORY:  Superior glenoid labrum lesion of right shoulder, subsequent encounter    TECHNIQUE:  Following written informed consent and discussion of applicable risks and benefits the patient was placed in the supine position on the examination table.  Using sterile technique and 1% lidocaine for local anesthesia a 22-gauge 3.5 "needle was " advanced into the right glenohumeral joint under fluoroscopic guidance.  Subsequently, 10 cc of a 1:200 solution of gadolinium in normal saline and iodinated contrast material was instilled into the right glenohumeral joint.  The procedure was tolerated well with no immediate complications.    COMPARISON:  None    FINDINGS:  Single spot image obtained and demonstrates normal contrast opacification of the right glenohumeral joint.  Impression: Successful and uneventful right glenohumeral injection for MR arthrography.    Electronically signed by: Bruce Gonzláes MD  Date:    11/28/2023  Time:    11:01        Relevant imaging results were reviewed and interpreted by me and per my read shows no acute abnormalities on radiographs.  This was discussed with the patient and / or family today.     Assessment:  Cyril Carlton is a 28 y.o. male following up for chronic right shoulder pain and stiffness.   History, physical and radiographs are consistent with a likely diagnosis of possible labral tear.   Plan: referral to Dr Reardon. MRA negative for tear but curious to see what Dr Reardon thinks of his MRI as well as his exam because there is an area of the labrum that looks questionable for possible tear but perhaps not truly a tear. Otherwise his MRI was unremarkable and his exam is reassuring today despite the patient still having symptoms with certain exercises at the gym. Continue conservative management for pain.   Follow up with Dr Reardon. All questions answered.      Chronic right shoulder pain               A copy of today's visit note has been sent to the referring provider.     Electronically signed:  Luis Tenorio MD, MPH  11/29/2023  2:01 PM

## 2023-11-29 NOTE — PATIENT INSTRUCTIONS
Assessment:  Cyril Carlton is a 28 y.o. male   Chief Complaint   Patient presents with    Right Shoulder - Pain     MRI review       No diagnosis found.     Plan:  Referral to Dr. Reardon for right shoulder    Follow-up: as needed.    Thank you for choosing [facility] and Dr. Luis Tenorio for your orthopedic & sports medicine care. It is our goal to provide you with exceptional care that will help keep you healthy, active, and get you back in the game.    Please do not hesitate to reach out to us via email, phone, or MyChart with any questions, concerns, or feedback.    If you felt that you received exemplary care today, please consider leaving us feedback on [employer] at:  [email or web address]    If you are experiencing pain/discomfort ,or have questions after 5pm and would like to be connected to the [facility] on-call team, please call this number and specify which Sports Medicine provider is treating you: [phone]

## 2023-12-01 ENCOUNTER — CLINICAL SUPPORT (OUTPATIENT)
Dept: REHABILITATION | Facility: HOSPITAL | Age: 28
End: 2023-12-01
Payer: OTHER GOVERNMENT

## 2023-12-01 DIAGNOSIS — M25.60 DECREASED RANGE OF MOTION WITH DECREASED STRENGTH: ICD-10-CM

## 2023-12-01 DIAGNOSIS — G89.29 CHRONIC NECK AND BACK PAIN: Primary | ICD-10-CM

## 2023-12-01 DIAGNOSIS — R53.1 DECREASED RANGE OF MOTION WITH DECREASED STRENGTH: ICD-10-CM

## 2023-12-01 DIAGNOSIS — M54.2 CHRONIC NECK AND BACK PAIN: Primary | ICD-10-CM

## 2023-12-01 DIAGNOSIS — M54.9 CHRONIC NECK AND BACK PAIN: Primary | ICD-10-CM

## 2023-12-01 PROCEDURE — 97110 THERAPEUTIC EXERCISES: CPT

## 2023-12-01 PROCEDURE — 97112 NEUROMUSCULAR REEDUCATION: CPT

## 2023-12-01 PROCEDURE — 97140 MANUAL THERAPY 1/> REGIONS: CPT

## 2023-12-01 NOTE — PROGRESS NOTES
[facility]  Physical Therapy Treatment Note + Updated Plan of Care      Name: Cyril Carlton  Clinic Number: [id number]    Therapy Diagnosis:   Encounter Diagnoses   Name Primary?    Chronic neck and back pain Yes    Decreased range of motion with decreased strength      Physician: Lon Thomas MD  Visit Date: 12/1/2023  Physician Orders: PT Eval and Treat  Medical Diagnosis from Referral: Neck Pain   Evaluation Date: 12/21/2022  Authorization Period Expiration: 12/31/22  Plan of Care Expiration: 01/05/2024                          Progress Update: 12/01/2023                        FOTO: 0 / 3    Visit # / Visits authorized: 31 / 45 (+4 visits)                          PRECAUTIONS: Standard Precautions      Time In: 1230  Time Out: 1315  Total Billable Time: 40 minutes    SUBJECTIVE     Pt reports: improvement with overall mobility. Complaint of pinching in right shoulder with overhead movements.   Compliance with Hep: Daily  Response to previous treatment: no adverse reactions to treatment/updated HEP  Functional change: No Change    Pain: 2/10   Worst: 7/10  Location: Right Shoulder   Pain Control: rest and stretching   Aggravating factors: overhead movements     OBJECTIVE     Objective Measures updated at progress report unless specified otherwise.    (Measurement performed 11/17/23)  FUNCTIONAL SCREEN:     Upper Extremity ROM Details STRENGTH Strength  08/18/23 Details   Shoulder WNL  discomfort Grossly 5/5 Grossly 5/5 Pain with abduction     Elbow WNL No Pain  Grossly 5/5 Grossly 5/5     Hand/Wrist WNL No pain  Grossly 5/5 Grossly 5/5        Gait Analysis: The patient ambulated with the following assistive device: none; the pt presents with the following gait abnormalities: decrease stride length in left side      Movement Analysis:   Functional Test  Outcome   Double Leg Squat WNL    Single Leg Step Down Trendelenburg sign bilaterally; pain limited patient's ability to perform  activity       Treatment     Gym/Equipment Access: increase secondary to verbal cues   Time to Complete Exercises: Increase secondary to verbal/tactile cues     Cyril received therapeutic exercises to develop strength, endurance, ROM, flexibility, posture, and core stabilization for 10 minutes including:  UBE/ Bike  7 minutes    Standing Rows x 3x10 7.5 lb cable    Prone Ws x 3x8   Standing ERs x 3x8 12.5 lb cable machine                            Cyril received the following manual therapy techniques: Joint mobilizations, Manual traction, Myofacial release, Manual Lymphatic Drainage, Soft tissue Mobilization, and Friction Massage were applied to the: bilateral upper traps for 15 minutes, including:  Soft tissue x Upper traps/ scalenes/ supraspinatus/ QLs    Joint Mobs x Cervical side bend    FDN x Upper traps/ infraspinatus (B)                                 Cyril participated in neuromuscular re-education activities to improve: Balance, Coordination, Kinesthetic, Proprioception, and Posture for 15 minutes. The following activities were included:  Wall [place]  x    Upper trap stretch      Chin Tucks  x    Open Books  x 2x10 (B)   Glute bridges   30x   Bosu ball push ups  5x5   Rui press x 3x8 5lbs cable machine    Standing Swimmers  3x8 (B)   Home Exercises and Patient Education Provided     Education/Self-Care provided: (included in treatment) minutes   Patient educated on the impairments noted above and the effects of physical therapy intervention to improve overall condition and QOL.   Patient was educated on all the above exercise prior/during/after for proper posture, positioning, and execution for safe performance with home exercise program.  Exercise Prescription:   12/21/2022: EVAL- Low      Written Home Exercises Provided: yes. Prefers: Electronic Copies  Exercises were reviewed and Cyril was able to demonstrate them prior to the end of the session.  Cyril demonstrated good understanding of the  education provided  See EMR under Patient Instructions for exercises provided during therapy sessions.    ASSESSMENT     Cyril Carlton tolerated PT session well with minimal complaints discomfort, secondary to poor motor control and decrease muscle endurance. Objective findings are improving with measurements of ROM and functional mobility.Therapy exercises were reviewed by revisiting exercises given from previous home exercise program while adding more dynamic stability exercises. Handouts were not issued during today's visit. Cyril demonstrated good understanding of new exercises and will continue to progress at home until next follow-up.       Cyril Is progressing well towards his goals.   Pt prognosis is Excellent.     Pt will continue to benefit from skilled outpatient physical therapy to address the deficits listed in the problem list box on initial evaluation, provide pt/family education and to maximize pt's level of independence in the home and community environment.     Pt's spiritual, cultural and educational needs considered and pt agreeable to plan of care and goals.    Anticipated barriers to physical therapy: occupation    GOALS:  SHORT TERM GOALS: 4 weeks, (12/21/23) 03/22/23   Recent signs and systems trend is improving in order to progress towards LTG's.  PC   Patient will be independent with HEP in order to further progress and return to maximal function.  PC    Pain rating at Worst: 5/10 in order to progress towards increased independence with activity.  PC    Patient will be able to correct postural deviations in sitting and standing, to decrease pain and promote postural awareness for injury prevention.   PC       LONG TERM GOALS: 8  and continuing weeks, (01/10/24) 03/22/23   Patient will return to normal ADL, recreational, and work related activities with less pain and limitation.    PC   Patient will improve AROM to stated goals in order to return to maximal functional potential.    PC    Patient will improve Strength to stated goals of appropriate musculature in order to improve functional independence.   PC    Pain Rating at Best: 1/10 to improve Quality of Life.   PC    Patient will meet predicted functional outcome (FOTO) score: TBA% to increase self-worth & perceived functional ability.  PC    Patient will have met/partially met personal goal of: being able to 20lbs overhead without pain   PC       PC = progressing/continue  PM= partially met  DC= discontinue    PLAN     Continue Plan of Care (POC) and progress per patient tolerance. See Treatment section for exercise progression.    Shayne Brody, PT, DPT

## 2023-12-09 NOTE — PROGRESS NOTES
Orthopaedics Sports Medicine     Shoulder Initial Visit         12/9/2023    Referring MD: Luis Tenorio MD    No chief complaint on file.        History of Present Illness:   Cyril Carlton is a 28 y.o. right-hand dominant male who presents with right shoulder pain and dysfunction.    Onset of the symptoms was ***     Inciting event: No specific injury or trauma. ***     Current symptoms include right shoulder pain localized *** with pain quality of ***. He rates the pain a ***/10 today. He denies any numbness or tingling. ***     Pain is aggravated by ***      Evaluation to date: X-Ray, MR Arthrogram     Treatment to date: Patient was initially seen by Dr. Luis Tenorio on 9/13/23 and his symptoms were felt ot be most consistent with shoulder impingement.  He was advised to continue PT and recommended use of ice/heat and Voltaren gel. He returned on 10/25/23 and his symptoms had persisted despite these treatments so they proceeded with MR Arthrogram. He was referred to me for further discussion of treatment options.       Past Medical History:   Past Medical History:   Diagnosis Date    Patient denies medical problems        Past Surgical History:   Past Surgical History:   Procedure Laterality Date    NO PAST SURGERIES         Medications:  Patient's Medications    No medications on file       Allergies: Review of patient's allergies indicates:  No Known Allergies    Social History:   Home town: [place], [place]  Occupation:  [employer]  Alcohol use: He reports current alcohol use of about 2.0 standard drinks of alcohol per week.  Tobacco use: He reports that he has never smoked. He has never used smokeless tobacco.    Review of systems:  History of recent illness, fevers, shakes, or chills: no  History of cardiac problems or chest pain: no  History of pulmonary problems or asthma: no  History of diabetes: no  History of prior dvt or clotting problems: no  History of sleep apnea: no      Physical  "Examination:  Estimated body mass index is 34.32 kg/m² as calculated from the following:    Height as of 11/29/23: 5' 4" (1.626 m).    Weight as of 11/29/23: 90.7 kg (199 lb 15.3 oz).    General  Healthy appearing male in no acute distress  Alert and oriented, normal mood, appropriate affect    Shoulder Examination:  Patient is alert and oriented, no distress. Skin is intact. Neuro is normal with no focal motor or sensory findings.    Cervical exam is unremarkable. Intact cervical ROM. Negative Spurling's test    Physical Exam:  RIGHT    LEFT    Scap Dyskinesis/Winging (-)    (-)    Tenderness:          Greater Tuberosity             (-)    (-)  Bicipital Groove  (-)    (-)  AC joint   (-)    (-)  Other:     ROM:  Forward Elevation 180***    180***  Abduction  120***    120***  ER (at side)  80***    80***  IR   T8***    T8***    Strength:   Supraspinatus  5/5    5/5  Infraspinatus  5/5    5/5  Subscap / IR  5/5    5/5     Special Tests:   Apprehension:   (-)    (-)   Yared Relocation:  (-)    (-)   Jerk / Posterior Load:  (-)    (-)   Neer:    (-)    (-)   Malcolm:   (-)    (-)   SS Stress:   (-)    (-)   Bear Hug:   (-)    (-)   [place]'s:   (-)    (-)   Resisted Thrower's:   (-)    (-)   Cross Arm Abduction:  (-)    (-)    Neurovascular examination  - Motor grossly intact bilaterally to shoulder abduction, elbow flexion and extension, wrist flexion and extension, and intrinsic hand musculature  - Sensation intact to light touch bilaterally in axillary, median, radial, and ulnar distributions  - Symmetrical radial pulses      Imaging:  XR Results:  Results for orders placed during the hospital encounter of 09/13/23    X-ray Shoulder 2 or More Views Right    Narrative  EXAMINATION:  XR SHOULDER COMPLETE 2 OR MORE VIEWS RIGHT    CLINICAL HISTORY:  Pain in right shoulder    TECHNIQUE:  Two or three views of the right shoulder were preformed.    COMPARISON:  None    FINDINGS:  There is no radiographic evidence of acute " osseous, articular, or soft tissue abnormality.  Joint spaces are preserved.    Impression  No acute findings      Electronically signed by: Joselo Araiza MD  Date:    09/13/2023  Time:    14:39      MRI Results:  Results for orders placed during the hospital encounter of 11/28/23    MRI Arthrogram Shoulder With Contrast Right    Narrative  EXAMINATION:  MRI ARTHROGRAM SHOULDER WITH CONTRAST RIGHT    CLINICAL HISTORY:  Shoulder pain, labral tear suspected, xray done;  Pain in right shoulder    TECHNIQUE:  MRI of the shoulder was performed after administration of intra-articular contrast utilizing the following sequences: Axial, sagittal and coronal T1 FS; coronal and sagittal T2 FS.    COMPARISON:  None.    FINDINGS:  Rotator cuff: Supraspinatus, infraspinatus, teres minor, and subscapularis tendons are intact.  Muscle bulk is maintained.    Labrum: Glenoid labrum is intact.    Biceps: Long head biceps tendon is intact.    Bone: No fracture.  Bone marrow signal is unremarkable.    Acromioclavicular joint: The AC joint is unremarkable.    Cartilage: Articular cartilage of the glenohumeral joint is preserved.    Impression  No labral tear or acute abnormality appreciated.      Electronically signed by: Bruce Gonzáles MD  Date:    11/28/2023  Time:    11:12      CT Results:  No results found for this or any previous visit.      Physician Read: I agree with the above impression.***      Impression:  28 y.o. male with right shoulder ***      Plan:  Reviewed imaging and discussed diagnosis and treatment options with patient today. ***  Follow up ***           Jesus Reardon MD    I, Aaron Patino, acted as a scribe for Jesus Reardon MD for the duration of this office visit.

## 2023-12-12 ENCOUNTER — OFFICE VISIT (OUTPATIENT)
Dept: SPORTS MEDICINE | Facility: CLINIC | Age: 28
End: 2023-12-12
Payer: OTHER GOVERNMENT

## 2023-12-12 VITALS — HEIGHT: 64 IN | WEIGHT: 199 LBS | BODY MASS INDEX: 33.97 KG/M2 | RESPIRATION RATE: 17 BRPM

## 2023-12-12 DIAGNOSIS — S43.431D SUPERIOR GLENOID LABRUM LESION OF RIGHT SHOULDER, SUBSEQUENT ENCOUNTER: ICD-10-CM

## 2023-12-12 DIAGNOSIS — M67.813 BICEPS TENDINOSIS OF RIGHT SHOULDER: Primary | ICD-10-CM

## 2023-12-12 DIAGNOSIS — G89.29 CHRONIC RIGHT SHOULDER PAIN: ICD-10-CM

## 2023-12-12 DIAGNOSIS — M25.511 CHRONIC RIGHT SHOULDER PAIN: ICD-10-CM

## 2023-12-12 PROCEDURE — 99999 PR PBB SHADOW E&M-EST. PATIENT-LVL III: CPT | Mod: PBBFAC,,, | Performed by: STUDENT IN AN ORGANIZED HEALTH CARE EDUCATION/TRAINING PROGRAM

## 2023-12-12 PROCEDURE — 99999 PR PBB SHADOW E&M-EST. PATIENT-LVL III: ICD-10-PCS | Mod: PBBFAC,,, | Performed by: STUDENT IN AN ORGANIZED HEALTH CARE EDUCATION/TRAINING PROGRAM

## 2023-12-12 PROCEDURE — 99213 OFFICE O/P EST LOW 20 MIN: CPT | Mod: S$PBB,,, | Performed by: STUDENT IN AN ORGANIZED HEALTH CARE EDUCATION/TRAINING PROGRAM

## 2023-12-12 PROCEDURE — 99213 PR OFFICE/OUTPT VISIT, EST, LEVL III, 20-29 MIN: ICD-10-PCS | Mod: S$PBB,,, | Performed by: STUDENT IN AN ORGANIZED HEALTH CARE EDUCATION/TRAINING PROGRAM

## 2023-12-12 PROCEDURE — 99213 OFFICE O/P EST LOW 20 MIN: CPT | Mod: PBBFAC | Performed by: STUDENT IN AN ORGANIZED HEALTH CARE EDUCATION/TRAINING PROGRAM

## 2023-12-12 NOTE — PROGRESS NOTES
Orthopaedics Sports Medicine     Shoulder Initial Visit         12/12/2023    Referring MD: Luis Tenorio MD    Chief Complaint   Patient presents with    Right Shoulder - Pain           History of Present Illness:   Cyril Carlton is a 28 y.o. right-hand dominant male who presents with right shoulder pain and dysfunction.    Onset of the symptoms was 4 months ago     Inciting event: No specific injury or trauma. Reports he feels like something is getting caught or stuck while doing weighted overhead press     Current symptoms include right shoulder pain localized anteriorly with pain quality of sharp. He rates the pain a 2/10 today. He denies any numbness or tingling. Reports pain decreases after getting warmed up during his lifting sessions.     Pain is aggravated by Weighted overhead press motions, basketball      Evaluation to date: X-Ray, MR Arthrogram     Treatment to date: Patient was initially seen by Dr. Luis Tenorio on 9/13/23 and his symptoms were felt ot be most consistent with shoulder impingement.  He was advised to continue PT and recommended use of ice/heat and Voltaren gel. He returned on 10/25/23 and his symptoms had persisted despite these treatments so they proceeded with MR Arthrogram. He was referred to me for further discussion of treatment options.       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  Tobacco use: He reports that he has never smoked. He has never used smokeless tobacco.    Review of  "systems:  History of recent illness, fevers, shakes, or chills: no  History of cardiac problems or chest pain: no  History of pulmonary problems or asthma: no  History of diabetes: no  History of prior dvt or clotting problems: no  History of sleep apnea: no      Physical Examination:  Estimated body mass index is 34.16 kg/m² as calculated from the following:    Height as of this encounter: 5' 4" (1.626 m).    Weight as of this encounter: 90.3 kg (199 lb).    General  Healthy appearing male in no acute distress  Alert and oriented, normal mood, appropriate affect    Shoulder Examination:  Patient is alert and oriented, no distress. Skin is intact. Neuro is normal with no focal motor or sensory findings.    Cervical exam is unremarkable. Intact cervical ROM. Negative Spurling's test    Physical Exam:  RIGHT    LEFT    Scap Dyskinesis/Winging (-)    (-)    Tenderness:          Greater Tuberosity             (-)    (-)  Bicipital Groove  +    (-)  AC joint   (-)    (-)  Other:     ROM:  Forward Elevation 180    180  Abduction  120    120  ER (at side)  80    80  IR   T8    T8    Strength:   Supraspinatus  5/5    5/5  Infraspinatus  5/5    5/5  Subscap / IR  5/5    5/5     Special Tests:   Apprehension:   (-)    (-)   Yared Relocation:  (-)    (-)   Jerk / Posterior Load:  (-)    (-)   Neer:    (-)    (-)   Malcolm:   (-)    (-)   SS Stress:   (-)    (-)   Bear Hug:   (-)    (-)   [place]'s:   (-)    (-)   Resisted Thrower's:   +    (-)   Cross Arm Abduction:  (-)    (-)   Speeds    (-)   Sulcus sign    (-)    Neurovascular examination  - Motor grossly intact bilaterally to shoulder abduction, elbow flexion and extension, wrist flexion and extension, and intrinsic hand musculature  - Sensation intact to light touch bilaterally in axillary, median, radial, and ulnar distributions  - Symmetrical radial pulses      Imaging:  XR Results:  Results for orders placed during the hospital encounter of 09/13/23    X-ray Shoulder 2 " or More Views Right    Narrative  EXAMINATION:  XR SHOULDER COMPLETE 2 OR MORE VIEWS RIGHT    CLINICAL HISTORY:  Pain in right shoulder    TECHNIQUE:  Two or three views of the right shoulder were preformed.    COMPARISON:  None    FINDINGS:  There is no radiographic evidence of acute osseous, articular, or soft tissue abnormality.  Joint spaces are preserved.    Impression  No acute findings      Electronically signed by: Joselo Araiza MD  Date:    09/13/2023  Time:    14:39      MRI Results:  Results for orders placed during the hospital encounter of 11/28/23    MRI Arthrogram Shoulder With Contrast Right    Narrative  EXAMINATION:  MRI ARTHROGRAM SHOULDER WITH CONTRAST RIGHT    CLINICAL HISTORY:  Shoulder pain, labral tear suspected, xray done;  Pain in right shoulder    TECHNIQUE:  MRI of the shoulder was performed after administration of intra-articular contrast utilizing the following sequences: Axial, sagittal and coronal T1 FS; coronal and sagittal T2 FS.    COMPARISON:  None.    FINDINGS:  Rotator cuff: Supraspinatus, infraspinatus, teres minor, and subscapularis tendons are intact.  Muscle bulk is maintained.    Labrum: Glenoid labrum is intact.    Biceps: Long head biceps tendon is intact.    Bone: No fracture.  Bone marrow signal is unremarkable.    Acromioclavicular joint: The AC joint is unremarkable.    Cartilage: Articular cartilage of the glenohumeral joint is preserved.    Impression  No labral tear or acute abnormality appreciated.      Electronically signed by: Bruce Gonzáles MD  Date:    11/28/2023  Time:    11:12      CT Results:  No results found for this or any previous visit.      Physician Read: I agree with the above impression.  There appears to be some intra-articular thickening of the long head of the biceps tendon.        Impression:  28 y.o. male with right shoulder biceps tendinosis, subcoracoid impingement and bursitis.       Plan:  Reviewed imaging and discussed diagnosis and  treatment options with patient today.  His history, physical exam, imaging findings are most consistent with right shoulder biceps tendinosis and subcoracoid impingement.  I discussed with the patient that there may be some thickening of the intra-articular portion of long head biceps tendon.   His symptoms have now been present for several months he has tried multiple nonoperative treatments including rest, activity modification, anti-inflammatories, and physical therapy.  I recommend continuing with nonoperative management.  Based on his current physical exam and imaging findings, I do not think that shoulder arthroscopy would predictably improve his symptoms.  We went over other interventions including the possibility of corticosteroid injection or biologic such as PRP.   I recommend a corticosteroid injection under ultrasound guidance into the biceps tendon sheath and subcoracoid space.   Follow up with  for ultrasound guided CSI.            Jesus Reardon MD    I, José Luis Borden, acted as a scribe for Jesus Reardon MD for the duration of this office visit.

## 2023-12-21 ENCOUNTER — CLINICAL SUPPORT (OUTPATIENT)
Dept: REHABILITATION | Facility: HOSPITAL | Age: 28
End: 2023-12-21
Payer: OTHER GOVERNMENT

## 2023-12-21 ENCOUNTER — OFFICE VISIT (OUTPATIENT)
Dept: SPORTS MEDICINE | Facility: CLINIC | Age: 28
End: 2023-12-21
Payer: OTHER GOVERNMENT

## 2023-12-21 DIAGNOSIS — M54.9 CHRONIC NECK AND BACK PAIN: Primary | ICD-10-CM

## 2023-12-21 DIAGNOSIS — M25.511 CHRONIC RIGHT SHOULDER PAIN: ICD-10-CM

## 2023-12-21 DIAGNOSIS — M25.60 DECREASED RANGE OF MOTION WITH DECREASED STRENGTH: ICD-10-CM

## 2023-12-21 DIAGNOSIS — G89.29 CHRONIC NECK AND BACK PAIN: Primary | ICD-10-CM

## 2023-12-21 DIAGNOSIS — G89.29 CHRONIC RIGHT SHOULDER PAIN: ICD-10-CM

## 2023-12-21 DIAGNOSIS — M54.2 CHRONIC NECK AND BACK PAIN: Primary | ICD-10-CM

## 2023-12-21 DIAGNOSIS — M67.813 BICEPS TENDINOSIS OF RIGHT SHOULDER: Primary | ICD-10-CM

## 2023-12-21 DIAGNOSIS — R53.1 DECREASED RANGE OF MOTION WITH DECREASED STRENGTH: ICD-10-CM

## 2023-12-21 DIAGNOSIS — M25.819 SHOULDER IMPINGEMENT: ICD-10-CM

## 2023-12-21 PROCEDURE — 99212 OFFICE O/P EST SF 10 MIN: CPT | Mod: PBBFAC | Performed by: STUDENT IN AN ORGANIZED HEALTH CARE EDUCATION/TRAINING PROGRAM

## 2023-12-21 PROCEDURE — 99999 PR PBB SHADOW E&M-EST. PATIENT-LVL II: CPT | Mod: PBBFAC,,, | Performed by: STUDENT IN AN ORGANIZED HEALTH CARE EDUCATION/TRAINING PROGRAM

## 2023-12-21 PROCEDURE — 99999 PR PBB SHADOW E&M-EST. PATIENT-LVL II: ICD-10-PCS | Mod: PBBFAC,,, | Performed by: STUDENT IN AN ORGANIZED HEALTH CARE EDUCATION/TRAINING PROGRAM

## 2023-12-21 PROCEDURE — 97112 NEUROMUSCULAR REEDUCATION: CPT | Performed by: PHYSICAL THERAPIST

## 2023-12-21 PROCEDURE — 20550 NJX 1 TENDON SHEATH/LIGAMENT: CPT | Mod: S$PBB,RT,, | Performed by: STUDENT IN AN ORGANIZED HEALTH CARE EDUCATION/TRAINING PROGRAM

## 2023-12-21 PROCEDURE — 99999PBSHW PR PBB SHADOW TECHNICAL ONLY FILED TO HB: Mod: PBBFAC,,,

## 2023-12-21 PROCEDURE — 99999PBSHW PR PBB SHADOW TECHNICAL ONLY FILED TO HB: ICD-10-PCS | Mod: PBBFAC,,,

## 2023-12-21 PROCEDURE — 97530 THERAPEUTIC ACTIVITIES: CPT | Performed by: PHYSICAL THERAPIST

## 2023-12-21 PROCEDURE — 99499 UNLISTED E&M SERVICE: CPT | Mod: S$PBB,,, | Performed by: STUDENT IN AN ORGANIZED HEALTH CARE EDUCATION/TRAINING PROGRAM

## 2023-12-21 PROCEDURE — 97140 MANUAL THERAPY 1/> REGIONS: CPT | Performed by: PHYSICAL THERAPIST

## 2023-12-21 PROCEDURE — 76942 ECHO GUIDE FOR BIOPSY: CPT | Mod: PBBFAC | Performed by: STUDENT IN AN ORGANIZED HEALTH CARE EDUCATION/TRAINING PROGRAM

## 2023-12-21 PROCEDURE — 20550 TENDON SHEATH: ICD-10-PCS | Mod: S$PBB,RT,, | Performed by: STUDENT IN AN ORGANIZED HEALTH CARE EDUCATION/TRAINING PROGRAM

## 2023-12-21 PROCEDURE — 76942 TENDON SHEATH: ICD-10-PCS | Mod: 26,S$PBB,, | Performed by: STUDENT IN AN ORGANIZED HEALTH CARE EDUCATION/TRAINING PROGRAM

## 2023-12-21 PROCEDURE — 99499 NO LOS: ICD-10-PCS | Mod: S$PBB,,, | Performed by: STUDENT IN AN ORGANIZED HEALTH CARE EDUCATION/TRAINING PROGRAM

## 2023-12-21 PROCEDURE — 97110 THERAPEUTIC EXERCISES: CPT | Performed by: PHYSICAL THERAPIST

## 2023-12-21 RX ORDER — BETAMETHASONE SODIUM PHOSPHATE AND BETAMETHASONE ACETATE 3; 3 MG/ML; MG/ML
6 INJECTION, SUSPENSION INTRA-ARTICULAR; INTRALESIONAL; INTRAMUSCULAR; SOFT TISSUE
Status: DISCONTINUED | OUTPATIENT
Start: 2023-12-21 | End: 2023-12-21 | Stop reason: HOSPADM

## 2023-12-21 RX ADMIN — BETAMETHASONE ACETATE AND BETAMETHASONE SODIUM PHOSPHATE 6 MG: 3; 3 INJECTION, SUSPENSION INTRA-ARTICULAR; INTRALESIONAL; INTRAMUSCULAR; SOFT TISSUE at 03:12

## 2023-12-21 NOTE — PROGRESS NOTES
[facility]  Physical Therapy Treatment Note + Updated Plan of Care      Name: Cyril Carlton  Clinic Number: [id number]    Therapy Diagnosis:   Encounter Diagnoses   Name Primary?    Chronic neck and back pain Yes    Decreased range of motion with decreased strength      Physician: Lon Thomas MD  Visit Date: 12/21/2023  Physician Orders: PT Eval and Treat  Medical Diagnosis from Referral: Neck Pain   Evaluation Date: 12/21/2022  Authorization Period Expiration: 12/31/22  Plan of Care Expiration: 01/05/2024                          Progress Update: 1/21/24                        FOTO: 0 / 3    Visit # / Visits authorized: 33 / 45 (+4 visits)                          PRECAUTIONS: Standard Precautions      Time In: 1230  Time Out: 1315  Total Billable Time: 61 minutes    SUBJECTIVE     Pt reports:He has had recent flare up of groin pain which he attributes to lower lumbar nerve root irritation. He has some issues with running because he feels like his foot turns out to the side, and when he adjusts his running mechanics it feels like it makes his back worse. Also with continues pain with overhead flexion.  Compliance with Hep: Daily  Response to previous treatment: no adverse reactions to treatment/updated HEP  Functional change: No Change    Pain: 2/10   Worst: 7/10  Location: Right Shoulder   Pain Control: rest and stretching   Aggravating factors: overhead movements     OBJECTIVE     Objective Measures updated at progress report unless specified otherwise.    (Measurement performed 11/17/23)  FUNCTIONAL SCREEN:     Upper Extremity ROM Details STRENGTH Strength  08/18/23 Details   Shoulder WNL  discomfort Grossly 5/5 Grossly 5/5 Pain with abduction     Elbow WNL No Pain  Grossly 5/5 Grossly 5/5     Hand/Wrist WNL No pain  Grossly 5/5 Grossly 5/5        Gait Analysis: The patient ambulated with the following assistive device: none; the pt presents with the following gait  abnormalities: decrease stride length in left side      Movement Analysis:   Functional Test  Outcome   Double Leg Squat WNL    Single Leg Step Down Trendelenburg sign bilaterally; pain limited patient's ability to perform activity       Running Gait  Meri- 150 Spm  R lateral foot progression angle  Increased valgus on R  Increased CPD on R  Increased pronation on R  Increased medial heel whip during swing on R  Overstriding with foot and tibial inclination angle greater than 10 degrees    Treatment     Gym/Equipment Access: increase secondary to verbal cues   Time to Complete Exercises: Increase secondary to verbal/tactile cues     Cyril received therapeutic exercises to develop strength, endurance, ROM, flexibility, posture, and core stabilization for 08 minutes including:    Strength and range of motion assessment  Minimal limitation in lumbar range of motion      UBE/ Bike  7 minutes    Standing Rows  3x10 7.5 lb cable    Prone Ws  3x8   Standing ERs  3x8 12.5 lb cable machine                            Cyril received the following manual therapy techniques: Joint mobilizations, Manual traction, Myofacial release, Manual Lymphatic Drainage, Soft tissue Mobilization, and Friction Massage were applied to the: bilateral upper traps for 08 minutes, including:    Mobility assessment  Mild decreased R ankle mobility  Normal hip mobility  R Lateral tibial torsion noted     Soft tissue  Upper traps/ scalenes/ supraspinatus/ QLs    Joint Mobs  Cervical side bend    FDN  Upper traps/ infraspinatus (B)                                 Cyril participated in neuromuscular re-education activities to improve: Balance, Coordination, Kinesthetic, Proprioception, and Posture for 15 minutes. The following activities were included:  Wall [place]      Upper trap stretch      Chin Tucks      Open Books   2x10 (B)   Glute bridges   30x   Bosu ball push ups  5x5   Chinese press  3x8 5lbs cable machine    Standing Swimmers  3x8 (B)    Serratus flexion with band x 15x     Cyril participated in dynamic functional therapeutic activities to improve functional performance for 30  minutes, including:    Running gait analysis and retraining x 30 minutes. See findings above.   Running with increased jessica      Home Exercises and Patient Education Provided     Education/Self-Care provided: (included in treatment) minutes   Patient educated on the impairments noted above and the effects of physical therapy intervention to improve overall condition and QOL.   Patient was educated on all the above exercise prior/during/after for proper posture, positioning, and execution for safe performance with home exercise program.  Exercise Prescription:   12/21/2022: EVAL- Low      Written Home Exercises Provided: yes. Prefers: Electronic Copies  Exercises were reviewed and Cyril was able to demonstrate them prior to the end of the session.  Cyril demonstrated good understanding of the education provided  See EMR under Patient Instructions for exercises provided during therapy sessions.    ASSESSMENT     Cyril Carlton tolerated PT session well with minimal  complaints of pain or discomfort. Patient noted to have R lateral tibial torsion that likely contributes to feeling of asymmetry with running. Running mechanics were imrpoved with increase in jessica.  Updated home exercises were issued during today's visit. Cyril demonstrated good understanding of new exercises and will continue to progress at home until next follow-up.         Cyril Is progressing well towards his goals.   Pt prognosis is Excellent.     Pt will continue to benefit from skilled outpatient physical therapy to address the deficits listed in the problem list box on initial evaluation, provide pt/family education and to maximize pt's level of independence in the home and community environment.     Pt's spiritual, cultural and educational needs considered and pt agreeable to plan of care and  goals.    Anticipated barriers to physical therapy: occupation    GOALS:  SHORT TERM GOALS: 4 weeks, (12/21/23) 03/22/23   Recent signs and systems trend is improving in order to progress towards LTG's.  PC   Patient will be independent with HEP in order to further progress and return to maximal function.  PC    Pain rating at Worst: 5/10 in order to progress towards increased independence with activity.  PC    Patient will be able to correct postural deviations in sitting and standing, to decrease pain and promote postural awareness for injury prevention.   PC       LONG TERM GOALS: 8  and continuing weeks, (01/10/24) 03/22/23   Patient will return to normal ADL, recreational, and work related activities with less pain and limitation.    PC   Patient will improve AROM to stated goals in order to return to maximal functional potential.    PC   Patient will improve Strength to stated goals of appropriate musculature in order to improve functional independence.   PC    Pain Rating at Best: 1/10 to improve Quality of Life.   PC    Patient will meet predicted functional outcome (FOTO) score: TBA% to increase self-worth & perceived functional ability.  PC    Patient will have met/partially met personal goal of: being able to 20lbs overhead without pain   PC       PC = progressing/continue  PM= partially met  DC= discontinue    PLAN     Continue Plan of Care (POC) and progress per patient tolerance. See Treatment section for exercise progression.    Carrillo Morales, PT, DPT

## 2023-12-21 NOTE — PROCEDURES
Tendon Sheath    Date/Time: 12/21/2023 3:00 PM    Performed by: Luis Calderon MD  Authorized by: Luis Calderon MD    Consent Done?:  Yes (Verbal)  Indications:  Pain and diagnostic evaluation  Site marked: the procedure site was marked    Timeout: prior to procedure the correct patient, procedure, and site was verified    Local anesthesia used?: Yes    Anesthesia:  Local infiltration  Local anesthetic:  Bupivacaine 0.5% without epinephrine, lidocaine 1% without epinephrine and topical anesthetic  Anesthetic total (ml):  4    Location:  Shoulder  Site:  R bicep tendon  Ultrasonic guidance for needle placement?: Yes    Needle size:  22 G  Approach:  Volar  Medications:  6 mg betamethasone acetate-betamethasone sodium phosphate 6 mg/mL  Patient tolerance:  Patient tolerated the procedure well with no immediate complications    Additional Comments: Ultrasound guidance was used for needle localization. Images were saved and stored for documentation. The appropriate structures were visualized. Dynamic visualization of the needle was continuous throughout the procedures and maintained good position.     We discussed the proper protocols after the injection such as no submerging pools, baths tubs, or hot tubs for 24 hr.  Showering is okay today.  We also discussed that blood sugars can be elevated after an injection and asked patient to properly check their sugars over the next few days and contact their PCP if there are any concerns.  We discussed red flags such as fevers, chills, red, warm, tender joint at the area of injection to please seek medical care immediately.

## 2023-12-21 NOTE — PATIENT INSTRUCTIONS
Assessment:  Cyril Carlton is a 28 y.o. male with a chief complaint of No chief complaint on file.    Encounter Diagnoses   Name Primary?    Biceps tendinosis of right shoulder Yes    Shoulder impingement     Chronic right shoulder pain       Plan:  Right shoulder proximal biceps tendon sheath injection.    Proper protocols after the injection included: no submerging pools, baths tubs, or hot tubs for 24 hr.  Showering is okay today.  Side effects of the corticosteroid injection can include elevated blood glucose levels and blood pressures, so if you are taking medications for these, please monitor closely, and contact your PCP if any issues.  Red flag symptoms include fever, chills, nausea, vomiting, red, warm, tender joint at the area of injection.  If you are noticing these symptoms, they may be indicative of an infection, and please seek medical care immediately, either by calling our clinic or going to the emergency room.  If not improving after 3-4 weeks, can consider for subcoracoid space injection    Follow-up:  As needed or sooner if there are any problems between now and then.    Thank you for choosing [facility] and Dr. Lusi Calderon for your orthopedic & sports medicine care. It is our goal to provide you with exceptional care that will help keep you healthy, active, and get you back in the game.    Please do not hesitate to reach out to us via email, phone, or MyChart with any questions, concerns, or feedback.    If you are experiencing pain/discomfort ,or have questions after 5pm and would like to be connected to the [facility] on-call team, please call this number and specify which Sports Medicine provider is treating you: [phone]

## 2023-12-22 ENCOUNTER — CLINICAL SUPPORT (OUTPATIENT)
Dept: REHABILITATION | Facility: HOSPITAL | Age: 28
End: 2023-12-22
Payer: OTHER GOVERNMENT

## 2023-12-22 DIAGNOSIS — M54.2 CHRONIC NECK AND BACK PAIN: Primary | ICD-10-CM

## 2023-12-22 DIAGNOSIS — G89.29 CHRONIC NECK AND BACK PAIN: Primary | ICD-10-CM

## 2023-12-22 DIAGNOSIS — M25.60 DECREASED RANGE OF MOTION WITH DECREASED STRENGTH: ICD-10-CM

## 2023-12-22 DIAGNOSIS — R53.1 DECREASED RANGE OF MOTION WITH DECREASED STRENGTH: ICD-10-CM

## 2023-12-22 DIAGNOSIS — M54.9 CHRONIC NECK AND BACK PAIN: Primary | ICD-10-CM

## 2023-12-22 PROCEDURE — 97140 MANUAL THERAPY 1/> REGIONS: CPT

## 2023-12-22 PROCEDURE — 97112 NEUROMUSCULAR REEDUCATION: CPT

## 2023-12-22 NOTE — PROGRESS NOTES
[facility]  Physical Therapy Treatment Note + Updated Plan of Care      Name: Cyril Carlton  Clinic Number: [id number]    Therapy Diagnosis:   Encounter Diagnoses   Name Primary?    Chronic neck and back pain Yes    Decreased range of motion with decreased strength      Physician: Lon Thomas MD  Visit Date: 12/22/2023  Physician Orders: PT Eval and Treat  Medical Diagnosis from Referral: Neck Pain   Evaluation Date: 12/21/2022  Authorization Period Expiration: 12/31/22  Plan of Care Expiration: 01/05/2024                          Progress Update: 12/01/2023                        FOTO: 0 / 3    Visit # / Visits authorized: 33 / 45 (+4 visits)                          PRECAUTIONS: Standard Precautions      Time In: 1330  Time Out: 1415  Total Billable Time: 40 minutes    SUBJECTIVE     Pt reports: tightness in lumbar/right glute region.   Compliance with Hep: Daily  Response to previous treatment: no adverse reactions to treatment/updated HEP  Functional change: No Change    Pain: 4/10   Worst: 7/10  Location: Right Shoulder   Pain Control: rest and stretching   Aggravating factors: overhead movements     OBJECTIVE     Objective Measures updated at progress report unless specified otherwise.    (Measurement performed 11/17/23)  FUNCTIONAL SCREEN:     Upper Extremity ROM Details STRENGTH Strength  08/18/23 Details   Shoulder WNL  discomfort Grossly 5/5 Grossly 5/5 Pain with abduction     Elbow WNL No Pain  Grossly 5/5 Grossly 5/5     Hand/Wrist WNL No pain  Grossly 5/5 Grossly 5/5        Gait Analysis: The patient ambulated with the following assistive device: none; the pt presents with the following gait abnormalities: decrease stride length in left side      Movement Analysis:   Functional Test  Outcome   Double Leg Squat WNL    Single Leg Step Down Trendelenburg sign bilaterally; pain limited patient's ability to perform activity       Treatment     Gym/Equipment Access: increase  secondary to verbal cues   Time to Complete Exercises: Increase secondary to verbal/tactile cues     Cyril received therapeutic exercises to develop strength, endurance, ROM, flexibility, posture, and core stabilization for 0 minutes including:  UBE/ Bike  7 minutes    Standing Rows  3x10 7.5 lb cable    Prone Ws  3x8   Standing ERs  3x8 12.5 lb cable machine                            Cyril received the following manual therapy techniques: Joint mobilizations, Manual traction, Myofacial release, Manual Lymphatic Drainage, Soft tissue Mobilization, and Friction Massage were applied to the: bilateral upper traps for 25 minutes, including:  Soft tissue x Upper traps/ supraspinatus/ QLs    Joint Mobs x Cervical side bend    FDN x Glute med/ multifus                                 Cyril participated in neuromuscular re-education activities to improve: Balance, Coordination, Kinesthetic, Proprioception, and Posture for 15 minutes. The following activities were included:  Wall [place]      Upper trap stretch      Chin Tucks      Open Books  x 2x10 (B)   Glute bridges  x 30x   Bosu ball push ups  5x5   Maldivian press x 3x8 5lbs cable machine    Standing Swimmers  3x8 (B)   Home Exercises and Patient Education Provided     Education/Self-Care provided: (included in treatment) minutes   Patient educated on the impairments noted above and the effects of physical therapy intervention to improve overall condition and QOL.   Patient was educated on all the above exercise prior/during/after for proper posture, positioning, and execution for safe performance with home exercise program.  Exercise Prescription:   12/21/2022: EVAL- Low      Written Home Exercises Provided: yes. Prefers: Electronic Copies  Exercises were reviewed and Cyril was able to demonstrate them prior to the end of the session.  Cyril demonstrated good understanding of the education provided  See EMR under Patient Instructions for exercises provided during therapy  sessions.    ASSESSMENT     Cyril Carlton tolerated PT session well with minimal complaints discomfort, secondary to poor motor control and decrease muscle endurance. Objective findings are improving with measurements of ROM and functional mobility.Therapy exercises were reviewed by revisiting exercises given from previous home exercise program while adding more dynamic stability exercises. Handouts were not issued during today's visit. Cyril demonstrated good understanding of new exercises and will continue to progress at home until next follow-up.       Cyril Is progressing well towards his goals.   Pt prognosis is Excellent.     Pt will continue to benefit from skilled outpatient physical therapy to address the deficits listed in the problem list box on initial evaluation, provide pt/family education and to maximize pt's level of independence in the home and community environment.     Pt's spiritual, cultural and educational needs considered and pt agreeable to plan of care and goals.    Anticipated barriers to physical therapy: occupation    GOALS:  SHORT TERM GOALS: 4 weeks, (12/21/23) 03/22/23   Recent signs and systems trend is improving in order to progress towards LTG's.  PC   Patient will be independent with HEP in order to further progress and return to maximal function.  PC    Pain rating at Worst: 5/10 in order to progress towards increased independence with activity.  PC    Patient will be able to correct postural deviations in sitting and standing, to decrease pain and promote postural awareness for injury prevention.   PC       LONG TERM GOALS: 8  and continuing weeks, (01/10/24) 03/22/23   Patient will return to normal ADL, recreational, and work related activities with less pain and limitation.    PC   Patient will improve AROM to stated goals in order to return to maximal functional potential.    PC   Patient will improve Strength to stated goals of appropriate musculature in order to improve  functional independence.   PC    Pain Rating at Best: 1/10 to improve Quality of Life.   PC    Patient will meet predicted functional outcome (FOTO) score: TBA% to increase self-worth & perceived functional ability.  PC    Patient will have met/partially met personal goal of: being able to 20lbs overhead without pain   PC       PC = progressing/continue  PM= partially met  DC= discontinue    PLAN     Continue Plan of Care (POC) and progress per patient tolerance. See Treatment section for exercise progression.    Shayne Brody, PT, DPT

## 2024-01-05 ENCOUNTER — CLINICAL SUPPORT (OUTPATIENT)
Dept: REHABILITATION | Facility: HOSPITAL | Age: 29
End: 2024-01-05
Payer: OTHER GOVERNMENT

## 2024-01-05 DIAGNOSIS — M54.2 CHRONIC NECK AND BACK PAIN: Primary | ICD-10-CM

## 2024-01-05 DIAGNOSIS — M54.9 CHRONIC NECK AND BACK PAIN: Primary | ICD-10-CM

## 2024-01-05 DIAGNOSIS — G89.29 CHRONIC NECK AND BACK PAIN: Primary | ICD-10-CM

## 2024-01-05 DIAGNOSIS — R53.1 DECREASED RANGE OF MOTION WITH DECREASED STRENGTH: ICD-10-CM

## 2024-01-05 DIAGNOSIS — M25.60 DECREASED RANGE OF MOTION WITH DECREASED STRENGTH: ICD-10-CM

## 2024-01-05 PROCEDURE — 97112 NEUROMUSCULAR REEDUCATION: CPT

## 2024-01-05 PROCEDURE — 97140 MANUAL THERAPY 1/> REGIONS: CPT

## 2024-01-05 NOTE — PROGRESS NOTES
[facility]  Physical Therapy Treatment Note + Updated Plan of Care      Name: Cyril Carlton  Clinic Number: [id number]    Therapy Diagnosis:   Encounter Diagnoses   Name Primary?    Chronic neck and back pain Yes    Decreased range of motion with decreased strength      Physician: Lon Thomas MD  Visit Date: 1/5/2024  Physician Orders: PT Eval and Treat  Medical Diagnosis from Referral: Neck Pain   Evaluation Date: 12/21/2022  Authorization Period Expiration: 12/31/22  Plan of Care Expiration: 02/05/2024                          Progress Update: 1/05/2024                        FOTO: 0 / 3    Visit # / Visits authorized: 1 / 25 (+29 visits)                          PRECAUTIONS: Standard Precautions      Time In: 1200  Time Out: 1245  Total Billable Time: 40 minutes    SUBJECTIVE     Pt reports: tightness in lumbar/right glute region.   Compliance with Hep: Daily  Response to previous treatment: no adverse reactions to treatment/updated HEP  Functional change: No Change    Pain: 4/10   Worst: 7/10  Location: Right Shoulder   Pain Control: rest and stretching   Aggravating factors: overhead movements     OBJECTIVE     Objective Measures updated at progress report unless specified otherwise.    (Measurement performed 01/05/24)  FUNCTIONAL SCREEN:      Movement Analysis:   Functional Test  Outcome   Double Leg Squat WNL    Single Leg Step Down Trendelenburg sign bilaterally; pain limited patient's ability to perform activity       Treatment     Gym/Equipment Access: increase secondary to verbal cues   Time to Complete Exercises: Increase secondary to verbal/tactile cues     Cyril received therapeutic exercises to develop strength, endurance, ROM, flexibility, posture, and core stabilization for 0 minutes including:  UBE/ Bike  7 minutes    Standing Rows  3x10 7.5 lb cable    Prone Ws  3x8   Standing ERs  3x8 12.5 lb cable machine                            Cyril received the following  manual therapy techniques: Joint mobilizations, Manual traction, Myofacial release, Manual Lymphatic Drainage, Soft tissue Mobilization, and Friction Massage were applied to the: bilateral upper traps for 25 minutes, including:  Soft tissue x Upper traps/ supraspinatus/ QLs    Joint Mobs x Cervical side bend    FDN x Glute med/ multifus                                 Cyril participated in neuromuscular re-education activities to improve: Balance, Coordination, Kinesthetic, Proprioception, and Posture for 15 minutes. The following activities were included:  Wall [place]      Upper trap stretch      Chin Tucks      Open Books  x 2x10 (B)   Glute bridges  x 30x   Bosu ball push ups  5x5   [place] press x 3x8 5lbs cable machine    Standing Swimmers  3x8 (B)   Home Exercises and Patient Education Provided     Education/Self-Care provided: (included in treatment) minutes   Patient educated on the impairments noted above and the effects of physical therapy intervention to improve overall condition and QOL.   Patient was educated on all the above exercise prior/during/after for proper posture, positioning, and execution for safe performance with home exercise program.  Exercise Prescription:   12/21/2022: EVAL- Low      Written Home Exercises Provided: yes. Prefers: Electronic Copies  Exercises were reviewed and Cyril was able to demonstrate them prior to the end of the session.  Cyril demonstrated good understanding of the education provided  See EMR under Patient Instructions for exercises provided during therapy sessions.    ASSESSMENT     Cyril Carlton tolerated PT session well with minimal complaints discomfort, secondary to poor motor control and decrease muscle endurance. Objective findings are improving with measurements of ROM and functional mobility.Therapy exercises were reviewed by revisiting exercises given from previous home exercise program while adding more dynamic stability exercises. Handouts were not issued  during today's visit. Cyril demonstrated good understanding of new exercises and will continue to progress at home until next follow-up.       Cyril Is progressing well towards his goals.   Pt prognosis is Excellent.     Pt will continue to benefit from skilled outpatient physical therapy to address the deficits listed in the problem list box on initial evaluation, provide pt/family education and to maximize pt's level of independence in the home and community environment.     Pt's spiritual, cultural and educational needs considered and pt agreeable to plan of care and goals.    Anticipated barriers to physical therapy: occupation    GOALS:  SHORT TERM GOALS: 4 weeks, (12/21/23) 03/22/23   Recent signs and systems trend is improving in order to progress towards LTG's.  PC   Patient will be independent with HEP in order to further progress and return to maximal function.  PC    Pain rating at Worst: 5/10 in order to progress towards increased independence with activity.  PC    Patient will be able to correct postural deviations in sitting and standing, to decrease pain and promote postural awareness for injury prevention.   PC       LONG TERM GOALS: 8  and continuing weeks, (01/10/24) 03/22/23   Patient will return to normal ADL, recreational, and work related activities with less pain and limitation.    PC   Patient will improve AROM to stated goals in order to return to maximal functional potential.    PC   Patient will improve Strength to stated goals of appropriate musculature in order to improve functional independence.   PC    Pain Rating at Best: 1/10 to improve Quality of Life.   PC    Patient will meet predicted functional outcome (FOTO) score: TBA% to increase self-worth & perceived functional ability.  PC    Patient will have met/partially met personal goal of: being able to 20lbs overhead without pain   PC       PC = progressing/continue  PM= partially met  DC= discontinue    PLAN     Continue Plan of  Care (POC) and progress per patient tolerance. See Treatment section for exercise progression.    Shayne Brody, PT, DPT

## 2024-01-12 ENCOUNTER — CLINICAL SUPPORT (OUTPATIENT)
Dept: REHABILITATION | Facility: HOSPITAL | Age: 29
End: 2024-01-12
Payer: OTHER GOVERNMENT

## 2024-01-12 DIAGNOSIS — M54.2 CHRONIC NECK AND BACK PAIN: Primary | ICD-10-CM

## 2024-01-12 DIAGNOSIS — M54.9 CHRONIC NECK AND BACK PAIN: Primary | ICD-10-CM

## 2024-01-12 DIAGNOSIS — M25.60 DECREASED RANGE OF MOTION WITH DECREASED STRENGTH: ICD-10-CM

## 2024-01-12 DIAGNOSIS — G89.29 CHRONIC NECK AND BACK PAIN: Primary | ICD-10-CM

## 2024-01-12 DIAGNOSIS — R53.1 DECREASED RANGE OF MOTION WITH DECREASED STRENGTH: ICD-10-CM

## 2024-01-12 PROCEDURE — 97140 MANUAL THERAPY 1/> REGIONS: CPT

## 2024-01-12 PROCEDURE — 97110 THERAPEUTIC EXERCISES: CPT

## 2024-01-12 PROCEDURE — 97112 NEUROMUSCULAR REEDUCATION: CPT

## 2024-01-12 NOTE — PROGRESS NOTES
[facility]  Physical Therapy Treatment Note + Updated Plan of Care      Name: Cyril Carlton  Clinic Number: [id number]    Therapy Diagnosis:   Encounter Diagnoses   Name Primary?    Chronic neck and back pain Yes    Decreased range of motion with decreased strength      Physician: Lon Thomas MD  Visit Date: 1/12/2024  Physician Orders: PT Eval and Treat  Medical Diagnosis from Referral: Neck Pain   Evaluation Date: 12/21/2022  Authorization Period Expiration: 12/31/22  Plan of Care Expiration: 02/05/2024                          Progress Update: 1/05/2024                        FOTO: 0 / 3    Visit # / Visits authorized: 2 / 25 (+29 visits)                          PRECAUTIONS: Standard Precautions      Time In: 1200  Time Out: 1245  Total Billable Time: 40 minutes    SUBJECTIVE     Pt reports: tightness in lumbar/right glute region.   Compliance with Hep: Daily  Response to previous treatment: no adverse reactions to treatment/updated HEP  Functional change: No Change    Pain: 4/10   Worst: 7/10  Location: Right Shoulder   Pain Control: rest and stretching   Aggravating factors: overhead movements     OBJECTIVE     Objective Measures updated at progress report unless specified otherwise.    (Measurement performed 01/05/24)  FUNCTIONAL SCREEN:      Movement Analysis:   Functional Test  Outcome   Double Leg Squat WNL    Single Leg Step Down Trendelenburg sign bilaterally; pain limited patient's ability to perform activity       Treatment     Gym/Equipment Access: increase secondary to verbal cues   Time to Complete Exercises: Increase secondary to verbal/tactile cues     Cyril received therapeutic exercises to develop strength, endurance, ROM, flexibility, posture, and core stabilization for 15 minutes including:  UBE/ Bike  7 minutes    Standing Rows x 3x10 7.5 lb cable    Prone Ws x 3x8   Standing ERs x 3x8 12.5 lb cable machine                            Cyril received the  following manual therapy techniques: Joint mobilizations, Manual traction, Myofacial release, Manual Lymphatic Drainage, Soft tissue Mobilization, and Friction Massage were applied to the: bilateral upper traps for 10 minutes, including:  Soft tissue x Upper traps/ supraspinatus/ QLs    Joint Mobs x Cervical side bend    FDN x Upper Trap                                 Cyril participated in neuromuscular re-education activities to improve: Balance, Coordination, Kinesthetic, Proprioception, and Posture for 15 minutes. The following activities were included:  Wall [place]  x    Upper trap stretch      Chin Tucks      Open Books  x 2x10 (B)   Glute bridges   30x   Bosu ball push ups  5x5   [place] press x 3x8 5lbs cable machine    Standing Swimmers  3x8 (B)   Home Exercises and Patient Education Provided     Education/Self-Care provided: (included in treatment) minutes   Patient educated on the impairments noted above and the effects of physical therapy intervention to improve overall condition and QOL.   Patient was educated on all the above exercise prior/during/after for proper posture, positioning, and execution for safe performance with home exercise program.  Exercise Prescription:   12/21/2022: EVAL- Low      Written Home Exercises Provided: yes. Prefers: Electronic Copies  Exercises were reviewed and Cyril was able to demonstrate them prior to the end of the session.  Cyril demonstrated good understanding of the education provided  See EMR under Patient Instructions for exercises provided during therapy sessions.    ASSESSMENT     Cyril Carlton tolerated PT session well with minimal complaints discomfort, secondary to poor motor control and decrease muscle endurance. Objective findings are improving with measurements of ROM and functional mobility.Therapy exercises were reviewed by revisiting exercises given from previous home exercise program while adding more dynamic stability exercises. Handouts were not issued  during today's visit. Cyril demonstrated good understanding of new exercises and will continue to progress at home until next follow-up.       Cyril Is progressing well towards his goals.   Pt prognosis is Excellent.     Pt will continue to benefit from skilled outpatient physical therapy to address the deficits listed in the problem list box on initial evaluation, provide pt/family education and to maximize pt's level of independence in the home and community environment.     Pt's spiritual, cultural and educational needs considered and pt agreeable to plan of care and goals.    Anticipated barriers to physical therapy: occupation    GOALS:  SHORT TERM GOALS: 4 weeks, (12/21/23) 03/22/23   Recent signs and systems trend is improving in order to progress towards LTG's.  PC   Patient will be independent with HEP in order to further progress and return to maximal function.  PC    Pain rating at Worst: 5/10 in order to progress towards increased independence with activity.  PC    Patient will be able to correct postural deviations in sitting and standing, to decrease pain and promote postural awareness for injury prevention.   PC       LONG TERM GOALS: 8  and continuing weeks, (01/10/24) 03/22/23   Patient will return to normal ADL, recreational, and work related activities with less pain and limitation.    PC   Patient will improve AROM to stated goals in order to return to maximal functional potential.    PC   Patient will improve Strength to stated goals of appropriate musculature in order to improve functional independence.   PC    Pain Rating at Best: 1/10 to improve Quality of Life.   PC    Patient will meet predicted functional outcome (FOTO) score: TBA% to increase self-worth & perceived functional ability.  PC    Patient will have met/partially met personal goal of: being able to 20lbs overhead without pain   PC       PC = progressing/continue  PM= partially met  DC= discontinue    PLAN     Continue Plan of  Care (POC) and progress per patient tolerance. See Treatment section for exercise progression.    Shayne Brody, PT, DPT

## 2024-01-26 ENCOUNTER — CLINICAL SUPPORT (OUTPATIENT)
Dept: REHABILITATION | Facility: HOSPITAL | Age: 29
End: 2024-01-26
Payer: OTHER GOVERNMENT

## 2024-01-26 DIAGNOSIS — M25.60 DECREASED RANGE OF MOTION WITH DECREASED STRENGTH: ICD-10-CM

## 2024-01-26 DIAGNOSIS — M54.9 CHRONIC NECK AND BACK PAIN: Primary | ICD-10-CM

## 2024-01-26 DIAGNOSIS — G89.29 CHRONIC NECK AND BACK PAIN: Primary | ICD-10-CM

## 2024-01-26 DIAGNOSIS — R53.1 DECREASED RANGE OF MOTION WITH DECREASED STRENGTH: ICD-10-CM

## 2024-01-26 DIAGNOSIS — M54.2 CHRONIC NECK AND BACK PAIN: Primary | ICD-10-CM

## 2024-01-26 PROCEDURE — 97112 NEUROMUSCULAR REEDUCATION: CPT

## 2024-01-26 PROCEDURE — 97110 THERAPEUTIC EXERCISES: CPT

## 2024-01-26 PROCEDURE — 97140 MANUAL THERAPY 1/> REGIONS: CPT

## 2024-01-26 NOTE — PROGRESS NOTES
[facility]  Physical Therapy Treatment Note + Updated Plan of Care      Name: Cyril Carlton  Clinic Number: [id number]    Therapy Diagnosis:   Encounter Diagnoses   Name Primary?    Chronic neck and back pain Yes    Decreased range of motion with decreased strength      Physician: Lon Thomas MD  Visit Date: 1/26/2024  Physician Orders: PT Eval and Treat  Medical Diagnosis from Referral: Neck Pain   Evaluation Date: 12/21/2022  Authorization Period Expiration: 12/31/22  Plan of Care Expiration: 02/05/2024                          Progress Update: 1/05/2024                        FOTO: 0 / 3    Visit # / Visits authorized: 3 / 25 (+29 visits)                          PRECAUTIONS: Standard Precautions      Time In: 0845  Time Out: 0930  Total Billable Time: 45 minutes    SUBJECTIVE     Pt reports: tightness in lumbar/right glute region.   Compliance with Hep: Daily  Response to previous treatment: no adverse reactions to treatment/updated HEP  Functional change: No Change    Pain: 4/10   Worst: 7/10  Location: Right Shoulder   Pain Control: rest and stretching   Aggravating factors: overhead movements     OBJECTIVE     Objective Measures updated at progress report unless specified otherwise.    (Measurement performed 01/05/24)  FUNCTIONAL SCREEN:      Movement Analysis:   Functional Test  Outcome   Double Leg Squat WNL    Single Leg Step Down Trendelenburg sign bilaterally; pain limited patient's ability to perform activity       Treatment     Gym/Equipment Access: increase secondary to verbal cues   Time to Complete Exercises: Increase secondary to verbal/tactile cues     Cyril received therapeutic exercises to develop strength, endurance, ROM, flexibility, posture, and core stabilization for 10 minutes including:  UBE/ Bike  7 minutes    Standing Rows x 3x10 7.5 lb cable    Prone Ws x 3x8   Standing ERs x 3x8 12.5 lb cable machine                            Cyril received the  following manual therapy techniques: Joint mobilizations, Manual traction, Myofacial release, Manual Lymphatic Drainage, Soft tissue Mobilization, and Friction Massage were applied to the: bilateral upper traps for 25 minutes, including:  Soft tissue x Upper traps/ supraspinatus/ QLs    Joint Mobs x Cervical side bend    FDN x Upper Trap                                 Cyril participated in neuromuscular re-education activities to improve: Balance, Coordination, Kinesthetic, Proprioception, and Posture for 10 minutes. The following activities were included:  Wall [place]      Upper trap stretch      Chin Tucks      Open Books  x 2x10 (B)   Glute bridges  x 30x   Bosu ball push ups  5x5   [place] press  3x8 5lbs cable machine    Standing Swimmers  3x8 (B)   Home Exercises and Patient Education Provided     Education/Self-Care provided: (included in treatment) minutes   Patient educated on the impairments noted above and the effects of physical therapy intervention to improve overall condition and QOL.   Patient was educated on all the above exercise prior/during/after for proper posture, positioning, and execution for safe performance with home exercise program.  Exercise Prescription:   12/21/2022: EVAL- Low      Written Home Exercises Provided: yes. Prefers: Electronic Copies  Exercises were reviewed and Cyril was able to demonstrate them prior to the end of the session.  Cyril demonstrated good understanding of the education provided  See EMR under Patient Instructions for exercises provided during therapy sessions.    ASSESSMENT     Cyril Carlton tolerated PT session well with minimal complaints discomfort, secondary to poor motor control and decrease muscle endurance. Objective findings are improving with measurements of ROM and functional mobility.Therapy exercises were reviewed by revisiting exercises given from previous home exercise program while adding more dynamic stability exercises. Handouts were not issued  during today's visit. Cyril demonstrated good understanding of new exercises and will continue to progress at home until next follow-up.       Cyril Is progressing well towards his goals.   Pt prognosis is Excellent.     Pt will continue to benefit from skilled outpatient physical therapy to address the deficits listed in the problem list box on initial evaluation, provide pt/family education and to maximize pt's level of independence in the home and community environment.     Pt's spiritual, cultural and educational needs considered and pt agreeable to plan of care and goals.    Anticipated barriers to physical therapy: occupation    GOALS:  SHORT TERM GOALS: 4 weeks, (12/21/23) 03/22/23   Recent signs and systems trend is improving in order to progress towards LTG's.  PC   Patient will be independent with HEP in order to further progress and return to maximal function.  PC    Pain rating at Worst: 5/10 in order to progress towards increased independence with activity.  PC    Patient will be able to correct postural deviations in sitting and standing, to decrease pain and promote postural awareness for injury prevention.   PC       LONG TERM GOALS: 8  and continuing weeks, (01/10/24) 03/22/23   Patient will return to normal ADL, recreational, and work related activities with less pain and limitation.    PC   Patient will improve AROM to stated goals in order to return to maximal functional potential.    PC   Patient will improve Strength to stated goals of appropriate musculature in order to improve functional independence.   PC    Pain Rating at Best: 1/10 to improve Quality of Life.   PC    Patient will meet predicted functional outcome (FOTO) score: TBA% to increase self-worth & perceived functional ability.  PC    Patient will have met/partially met personal goal of: being able to 20lbs overhead without pain   PC       PC = progressing/continue  PM= partially met  DC= discontinue    PLAN     Continue Plan of  Care (POC) and progress per patient tolerance. See Treatment section for exercise progression.    Shayne Brody, PT, DPT

## 2024-02-09 ENCOUNTER — CLINICAL SUPPORT (OUTPATIENT)
Dept: REHABILITATION | Facility: HOSPITAL | Age: 29
End: 2024-02-09
Payer: OTHER GOVERNMENT

## 2024-02-09 DIAGNOSIS — R53.1 DECREASED RANGE OF MOTION WITH DECREASED STRENGTH: ICD-10-CM

## 2024-02-09 DIAGNOSIS — G89.29 CHRONIC NECK AND BACK PAIN: Primary | ICD-10-CM

## 2024-02-09 DIAGNOSIS — M25.60 DECREASED RANGE OF MOTION WITH DECREASED STRENGTH: ICD-10-CM

## 2024-02-09 DIAGNOSIS — M54.2 CHRONIC NECK AND BACK PAIN: Primary | ICD-10-CM

## 2024-02-09 DIAGNOSIS — M54.9 CHRONIC NECK AND BACK PAIN: Primary | ICD-10-CM

## 2024-02-09 PROCEDURE — 97112 NEUROMUSCULAR REEDUCATION: CPT

## 2024-02-09 PROCEDURE — 97110 THERAPEUTIC EXERCISES: CPT

## 2024-02-09 NOTE — PROGRESS NOTES
[facility] OUTPATIENT THERAPY AND WELLNESS  Physical Therapy Treatment Note + Updated Plan of Care      Name: Cyril Carlton  Clinic Number: [id number]    Therapy Diagnosis:   Encounter Diagnoses   Name Primary?    Chronic neck and back pain Yes    Decreased range of motion with decreased strength      Physician: Lon Thomas MD  Visit Date: 2/9/2024  Physician Orders: PT Eval and Treat  Medical Diagnosis from Referral: Neck Pain   Evaluation Date: 12/21/2022  Authorization Period Expiration: 12/31/22  Plan of Care Expiration: 03/09/2024                          Progress Update: 2/09/2024                        FOTO: 0 / 3    Visit # / Visits authorized: 4 / 8 (+29 visits)                          PRECAUTIONS: Standard Precautions      Time In: 0915  Time Out: 1000  Total Billable Time: 45 minutes    SUBJECTIVE     Pt reports: tightness in cervical/right shoulder region .   Compliance with Hep: Daily  Response to previous treatment: no adverse reactions to treatment/updated HEP  Functional change: No Change    Pain: 4/10   Worst: 7/10  Location: Right Shoulder   Pain Control: rest and stretching   Aggravating factors: overhead movements     OBJECTIVE     Objective Measures updated at progress report unless specified otherwise.    (Measurement performed 02/09/24)  FUNCTIONAL SCREEN:      Movement Analysis:   Functional Test  Outcome   Double Leg Squat WNL    Single Leg Step Down Trendelenburg sign bilaterally; pain limited patient's ability to perform activity    Overhead movement  Over activation upper trap and scapula winging right shoulder      Treatment     Gym/Equipment Access: increase secondary to verbal cues   Time to Complete Exercises: Increase secondary to verbal/tactile cues     [place] received therapeutic exercises to develop strength, endurance, ROM, flexibility, posture, and core stabilization for 20 minutes including:  UBE/ Bike  7 minutes    Standing Rows x 3x10 7.5 lb cable    Prone Ws x 3x8    Standing ERs x 3x8 12.5 lb cable machine                            Cyril received the following manual therapy techniques: Joint mobilizations, Manual traction, Myofacial release, Manual Lymphatic Drainage, Soft tissue Mobilization, and Friction Massage were applied to the: bilateral upper traps for 0 minutes, including:  Soft tissue  Upper traps/ supraspinatus/ QLs    Joint Mobs  Cervical side bend    FDN  Upper Trap                                 Cyril participated in neuromuscular re-education activities to improve: Balance, Coordination, Kinesthetic, Proprioception, and Posture for 25 minutes. The following activities were included:  Wall [place]      Upper trap stretch  x 2 mins B   Chin Tucks      Open Books   2x10 (B)   Glute bridges   30x   Bosu ball push ups x 5x2 uneven surface   [place] press x 3x8 5lbs cable machine    Standing Swimmers  3x8 (B)   Sandbag swings  x 2x10 1/2 kneeling    D1/D2 ext x 2x10 7.5lb cable machine    Home Exercises and Patient Education Provided     Education/Self-Care provided: (included in treatment) minutes   Patient educated on the impairments noted above and the effects of physical therapy intervention to improve overall condition and QOL.   Patient was educated on all the above exercise prior/during/after for proper posture, positioning, and execution for safe performance with home exercise program.  Exercise Prescription:   12/21/2022: EVAL- Low      Written Home Exercises Provided: yes. Prefers: Electronic Copies  Exercises were reviewed and Cyril was able to demonstrate them prior to the end of the session.  Cyril demonstrated good understanding of the education provided  See EMR under Patient Instructions for exercises provided during therapy sessions.    ASSESSMENT     Cyril Carlton tolerated PT session well with minimal complaints discomfort, secondary to poor motor control and decrease muscle endurance. Objective findings are improving with measurements of ROM and  functional mobility.Therapy exercises were reviewed by revisiting exercises given from previous home exercise program while adding more dynamic stability exercises. Handouts were not issued during today's visit. Cyril demonstrated good understanding of new exercises and will continue to progress at home until next follow-up.       Cyril Is progressing well towards his goals.   Pt prognosis is Excellent.     Pt will continue to benefit from skilled outpatient physical therapy to address the deficits listed in the problem list box on initial evaluation, provide pt/family education and to maximize pt's level of independence in the home and community environment.     Pt's spiritual, cultural and educational needs considered and pt agreeable to plan of care and goals.    Anticipated barriers to physical therapy: occupation    GOALS:  SHORT TERM GOALS: 4 weeks, () 03/22/23   Recent signs and systems trend is improving in order to progress towards LTG's.  PC   Patient will be independent with HEP in order to further progress and return to maximal function.  PC    Pain rating at Worst: 5/10 in order to progress towards increased independence with activity.  PC    Patient will be able to correct postural deviations in sitting and standing, to decrease pain and promote postural awareness for injury prevention.   PC       LONG TERM GOALS: 8  and continuing weeks, () 03/22/23   Patient will return to normal ADL, recreational, and work related activities with less pain and limitation.    PC   Patient will improve AROM to stated goals in order to return to maximal functional potential.    PC   Patient will improve Strength to stated goals of appropriate musculature in order to improve functional independence.   PC    Pain Rating at Best: 1/10 to improve Quality of Life.   PC    Patient will meet predicted functional outcome (FOTO) score: TBA% to increase self-worth & perceived functional ability.  PC    Patient will have  met/partially met personal goal of: being able to 20lbs overhead without pain   PC       PC = progressing/continue  PM= partially met  DC= discontinue    PLAN     Continue Plan of Care (POC) and progress per patient tolerance. See Treatment section for exercise progression.    Shayne Brody, PT, DPT

## 2024-02-23 ENCOUNTER — CLINICAL SUPPORT (OUTPATIENT)
Dept: REHABILITATION | Facility: HOSPITAL | Age: 29
End: 2024-02-23
Payer: OTHER GOVERNMENT

## 2024-02-23 DIAGNOSIS — M54.9 CHRONIC NECK AND BACK PAIN: Primary | ICD-10-CM

## 2024-02-23 DIAGNOSIS — M25.60 DECREASED RANGE OF MOTION WITH DECREASED STRENGTH: ICD-10-CM

## 2024-02-23 DIAGNOSIS — R53.1 DECREASED RANGE OF MOTION WITH DECREASED STRENGTH: ICD-10-CM

## 2024-02-23 DIAGNOSIS — M54.2 CHRONIC NECK AND BACK PAIN: Primary | ICD-10-CM

## 2024-02-23 DIAGNOSIS — G89.29 CHRONIC NECK AND BACK PAIN: Primary | ICD-10-CM

## 2024-02-23 PROCEDURE — 97110 THERAPEUTIC EXERCISES: CPT

## 2024-02-23 PROCEDURE — 97112 NEUROMUSCULAR REEDUCATION: CPT

## 2024-02-23 PROCEDURE — 97140 MANUAL THERAPY 1/> REGIONS: CPT

## 2024-02-23 NOTE — PROGRESS NOTES
[facility]  Physical Therapy Treatment Note      Name: Cyril Carlton  [facility] Number: [id number]    Therapy Diagnosis:   Encounter Diagnoses   Name Primary?    Chronic neck and back pain Yes    Decreased range of motion with decreased strength      Physician: Lon Thomas MD  Visit Date: 2/23/2024  Physician Orders: PT Eval and Treat  Medical Diagnosis from Referral: Neck Pain   Evaluation Date: 12/21/2022  Authorization Period Expiration: 12/31/22  Plan of Care Expiration: 03/09/2024                          Progress Update: 2/09/2024                        FOTO: 0 / 3    Visit # / Visits authorized: 5 / 8 (+29 visits)                          PRECAUTIONS: Standard Precautions      Time In: 0915  Time Out: 1000  Total Billable Time: 40 minutes    SUBJECTIVE     Pt reports: tightness in right shoulder region .   Compliance with Hep: Daily  Response to previous treatment: no adverse reactions to treatment/updated HEP  Functional change: No Change    Pain: 4/10   Worst: 7/10  Location: Right Shoulder   Pain Control: rest and stretching   Aggravating factors: overhead movements     OBJECTIVE     Objective Measures updated at progress report unless specified otherwise.    (Measurement performed 02/09/24)  FUNCTIONAL SCREEN:      Movement Analysis:   Functional Test  Outcome   Double Leg Squat WNL    Single Leg Step Down Trendelenburg sign bilaterally; pain limited patient's ability to perform activity    Overhead movement  Over activation upper trap and scapula winging right shoulder      Treatment     Gym/Equipment Access: increase secondary to verbal cues   Time to Complete Exercises: Increase secondary to verbal/tactile cues     [place] received therapeutic exercises to develop strength, endurance, ROM, flexibility, posture, and core stabilization for 15 minutes including:  UBE/ Bike  7 minutes    Standing Rows x 3x10 7.5 lb cable    Prone Ws x 3x8   Standing ERs x 3x8 12.5 lb cable  machine                            Cyril received the following manual therapy techniques: Joint mobilizations, Manual traction, Myofacial release, Manual Lymphatic Drainage, Soft tissue Mobilization, and Friction Massage were applied to the: bilateral upper traps for 10 minutes, including:  Soft tissue x Upper traps/ supraspinatus/ QLs    Joint Mobs x Cervical side bend    FDN x Upper Trap                                 Cyril participated in neuromuscular re-education activities to improve: Balance, Coordination, Kinesthetic, Proprioception, and Posture for 15 minutes. The following activities were included:  Wall [place]      Upper trap stretch  x 2 mins B   Chin Tucks      Open Books  x 2x10 (B)   Glute bridges   30x   Bosu ball push ups  5x2 uneven surface   Rui press  3x8 5lbs cable machine    Standing Swimmers  3x8 (B)   Sandbag swings   2x10 1/2 kneeling    D1/D2 ext x 2x10 7.5lb cable machine    Home Exercises and Patient Education Provided     Education/Self-Care provided: (included in treatment) minutes   Patient educated on the impairments noted above and the effects of physical therapy intervention to improve overall condition and QOL.   Patient was educated on all the above exercise prior/during/after for proper posture, positioning, and execution for safe performance with home exercise program.  Exercise Prescription:   12/21/2022: EVAL- Low      Written Home Exercises Provided: yes. Prefers: Electronic Copies  Exercises were reviewed and Cyril was able to demonstrate them prior to the end of the session.  Cyril demonstrated good understanding of the education provided  See EMR under Patient Instructions for exercises provided during therapy sessions.    ASSESSMENT     Cyril Carlton tolerated PT session well with minimal complaints discomfort, secondary to poor motor control and decrease muscle endurance. Objective findings are improving with measurements of ROM and functional mobility.Therapy  exercises were reviewed by revisiting exercises given from previous home exercise program while adding more dynamic stability exercises. Handouts were not issued during today's visit. Cyril demonstrated good understanding of new exercises and will continue to progress at home until next follow-up.       Cyril Is progressing well towards his goals.   Pt prognosis is Excellent.     Pt will continue to benefit from skilled outpatient physical therapy to address the deficits listed in the problem list box on initial evaluation, provide pt/family education and to maximize pt's level of independence in the home and community environment.     Pt's spiritual, cultural and educational needs considered and pt agreeable to plan of care and goals.    Anticipated barriers to physical therapy: occupation    GOALS:  SHORT TERM GOALS: 4 weeks, () 03/22/23   Recent signs and systems trend is improving in order to progress towards LTG's.  PC   Patient will be independent with HEP in order to further progress and return to maximal function.  PC    Pain rating at Worst: 5/10 in order to progress towards increased independence with activity.  PC    Patient will be able to correct postural deviations in sitting and standing, to decrease pain and promote postural awareness for injury prevention.   PC       LONG TERM GOALS: 8  and continuing weeks, () 03/22/23   Patient will return to normal ADL, recreational, and work related activities with less pain and limitation.    PC   Patient will improve AROM to stated goals in order to return to maximal functional potential.    PC   Patient will improve Strength to stated goals of appropriate musculature in order to improve functional independence.   PC    Pain Rating at Best: 1/10 to improve Quality of Life.   PC    Patient will meet predicted functional outcome (FOTO) score: TBA% to increase self-worth & perceived functional ability.  PC    Patient will have met/partially met personal goal of:  being able to 20lbs overhead without pain   PC       PC = progressing/continue  PM= partially met  DC= discontinue    PLAN     Continue Plan of Care (POC) and progress per patient tolerance. See Treatment section for exercise progression.    Shayne Brody, PT, DPT

## 2024-03-08 ENCOUNTER — CLINICAL SUPPORT (OUTPATIENT)
Dept: REHABILITATION | Facility: HOSPITAL | Age: 29
End: 2024-03-08
Payer: OTHER GOVERNMENT

## 2024-03-08 DIAGNOSIS — M54.9 CHRONIC NECK AND BACK PAIN: Primary | ICD-10-CM

## 2024-03-08 DIAGNOSIS — M54.2 CHRONIC NECK AND BACK PAIN: Primary | ICD-10-CM

## 2024-03-08 DIAGNOSIS — R53.1 DECREASED RANGE OF MOTION WITH DECREASED STRENGTH: ICD-10-CM

## 2024-03-08 DIAGNOSIS — M25.60 DECREASED RANGE OF MOTION WITH DECREASED STRENGTH: ICD-10-CM

## 2024-03-08 DIAGNOSIS — G89.29 CHRONIC NECK AND BACK PAIN: Primary | ICD-10-CM

## 2024-03-08 PROCEDURE — 97112 NEUROMUSCULAR REEDUCATION: CPT

## 2024-03-08 PROCEDURE — 97110 THERAPEUTIC EXERCISES: CPT

## 2024-03-08 PROCEDURE — 97140 MANUAL THERAPY 1/> REGIONS: CPT

## 2024-03-08 NOTE — PROGRESS NOTES
[facility]  Physical Therapy Treatment Note + Updated Plan of Care      Name: Cyril Carlton  Clinic Number: [id number]    Therapy Diagnosis:   Encounter Diagnoses   Name Primary?    Chronic neck and back pain Yes    Decreased range of motion with decreased strength      Physician: Lon Thomas MD  Visit Date: 3/8/2024  Physician Orders: PT Eval and Treat  Medical Diagnosis from Referral: Neck Pain   Evaluation Date: 12/21/2022  Authorization Period Expiration: 12/31/22  Plan of Care Expiration: 04/09/2024                          Progress Update: 3/09/2024                        FOTO: 0 / 3    Visit # / Visits authorized: 6 / 8 (+29 visits)                          PRECAUTIONS: Standard Precautions      Time In: 1400  Time Out: 1445  Total Billable Time: 40 minutes    SUBJECTIVE     Pt reports: tightness in right shoulder region .   Compliance with Hep: Daily  Response to previous treatment: no adverse reactions to treatment/updated HEP  Functional change: No Change    Pain: 4/10   Worst: 7/10  Location: Right Shoulder   Pain Control: rest and stretching   Aggravating factors: overhead movements     OBJECTIVE     Objective Measures updated at progress report unless specified otherwise.    (Measurement performed 03/08/24)  FUNCTIONAL SCREEN:      Movement Analysis:   Functional Test  Outcome   Double Leg Squat WNL    Single Leg Step Down Trendelenburg sign bilaterally; pain limited patient's ability to perform activity    Overhead movement  Over activation upper trap and scapula winging right shoulder      Treatment     Gym/Equipment Access: increase secondary to verbal cues   Time to Complete Exercises: Increase secondary to verbal/tactile cues     [place] received therapeutic exercises to develop strength, endurance, ROM, flexibility, posture, and core stabilization for 15 minutes including:  UBE/ Bike  7 minutes    Standing Rows x 3x10 7.5 lb cable    Prone Ws x 3x8   Standing ERs x  3x8 12.5 lb cable machine                            Cyril received the following manual therapy techniques: Joint mobilizations, Manual traction, Myofacial release, Manual Lymphatic Drainage, Soft tissue Mobilization, and Friction Massage were applied to the: bilateral upper traps for 10 minutes, including:  Soft tissue x Upper traps/ supraspinatus/ QLs    Joint Mobs x Cervical side bend    FDN x Upper Trap                                 Cyril participated in neuromuscular re-education activities to improve: Balance, Coordination, Kinesthetic, Proprioception, and Posture for 15 minutes. The following activities were included:  Wall [place]      Upper trap stretch  x 2 mins B   Chin Tucks      Open Books  x 2x10 (B)   Glute bridges   30x   Bosu ball push ups  5x2 uneven surface   [place] press  3x8 5lbs cable machine    Standing Swimmers  3x8 (B)   Sandbag swings   2x10 1/2 kneeling    D1/D2 ext x 2x10 7.5lb cable machine    Home Exercises and Patient Education Provided     Education/Self-Care provided: (included in treatment) minutes   Patient educated on the impairments noted above and the effects of physical therapy intervention to improve overall condition and QOL.   Patient was educated on all the above exercise prior/during/after for proper posture, positioning, and execution for safe performance with home exercise program.  Exercise Prescription:   12/21/2022: EVAL- Low      Written Home Exercises Provided: yes. Prefers: Electronic Copies  Exercises were reviewed and Cyril was able to demonstrate them prior to the end of the session.  Cyril demonstrated good understanding of the education provided  See EMR under Patient Instructions for exercises provided during therapy sessions.    ASSESSMENT     Cyril Carlton tolerated PT session well with minimal complaints discomfort, secondary to poor motor control and decrease muscle endurance. Objective findings are improving with measurements of ROM and functional  mobility.Therapy exercises were reviewed by revisiting exercises given from previous home exercise program while adding more dynamic stability exercises. Handouts were not issued during today's visit. Cyril demonstrated good understanding of new exercises and will continue to progress at home until next follow-up.       Cyril Is progressing well towards his goals.   Pt prognosis is Excellent.     Pt will continue to benefit from skilled outpatient physical therapy to address the deficits listed in the problem list box on initial evaluation, provide pt/family education and to maximize pt's level of independence in the home and community environment.     Pt's spiritual, cultural and educational needs considered and pt agreeable to plan of care and goals.    Anticipated barriers to physical therapy: occupation    GOALS:  SHORT TERM GOALS: 4 weeks, () 03/22/23   Recent signs and systems trend is improving in order to progress towards LTG's.  PC   Patient will be independent with HEP in order to further progress and return to maximal function.  PC    Pain rating at Worst: 5/10 in order to progress towards increased independence with activity.  PC    Patient will be able to correct postural deviations in sitting and standing, to decrease pain and promote postural awareness for injury prevention.   PC       LONG TERM GOALS: 8  and continuing weeks, () 03/22/23   Patient will return to normal ADL, recreational, and work related activities with less pain and limitation.    PC   Patient will improve AROM to stated goals in order to return to maximal functional potential.    PC   Patient will improve Strength to stated goals of appropriate musculature in order to improve functional independence.   PC    Pain Rating at Best: 1/10 to improve Quality of Life.   PC    Patient will meet predicted functional outcome (FOTO) score: TBA% to increase self-worth & perceived functional ability.  PC    Patient will have met/partially met  personal goal of: being able to 20lbs overhead without pain   PC       PC = progressing/continue  PM= partially met  DC= discontinue    PLAN     Continue Plan of Care (POC) and progress per patient tolerance. See Treatment section for exercise progression.    Shayne Brody, PT, DPT

## 2024-05-03 ENCOUNTER — CLINICAL SUPPORT (OUTPATIENT)
Dept: REHABILITATION | Facility: HOSPITAL | Age: 29
End: 2024-05-03
Payer: OTHER GOVERNMENT

## 2024-05-03 DIAGNOSIS — M25.60 DECREASED RANGE OF MOTION WITH DECREASED STRENGTH: ICD-10-CM

## 2024-05-03 DIAGNOSIS — R53.1 DECREASED RANGE OF MOTION WITH DECREASED STRENGTH: ICD-10-CM

## 2024-05-03 DIAGNOSIS — M54.2 CHRONIC NECK AND BACK PAIN: Primary | ICD-10-CM

## 2024-05-03 DIAGNOSIS — M54.9 CHRONIC NECK AND BACK PAIN: Primary | ICD-10-CM

## 2024-05-03 DIAGNOSIS — G89.29 CHRONIC NECK AND BACK PAIN: Primary | ICD-10-CM

## 2024-05-03 PROCEDURE — 97110 THERAPEUTIC EXERCISES: CPT

## 2024-05-03 PROCEDURE — 97112 NEUROMUSCULAR REEDUCATION: CPT

## 2024-05-03 PROCEDURE — 97140 MANUAL THERAPY 1/> REGIONS: CPT

## 2024-05-03 NOTE — PROGRESS NOTES
[facility]  Physical Therapy Treatment Note + Updated Plan of Care      Name: Cyril Carlton  Clinic Number: [id number]    Therapy Diagnosis:   Encounter Diagnoses   Name Primary?    Chronic neck and back pain Yes    Decreased range of motion with decreased strength      Physician: Lon Thomas MD  Visit Date: 5/3/2024  Physician Orders: PT Eval and Treat  Medical Diagnosis from Referral: Neck Pain   Evaluation Date: 12/21/2022  Authorization Period Expiration: 12/31/22  Plan of Care Expiration: 06/03/2024                          Progress Update: 3/09/2024                        FOTO: 0 / 3    Visit # / Visits authorized: 7 / 8 (+29 visits)                          PRECAUTIONS: Standard Precautions      Time In: 1130  Time Out: 1240  Total Billable Time: 60 minutes    SUBJECTIVE     Pt reports: tightness in right shoulder region .   Compliance with Hep: Daily  Response to previous treatment: no adverse reactions to treatment/updated HEP  Functional change: No Change    Pain: 4/10   Worst: 7/10  Location: Right Shoulder   Pain Control: rest and stretching   Aggravating factors: overhead movements     OBJECTIVE     Objective Measures updated at progress report unless specified otherwise.  (Measurement performed 05/03/24)  FUNCTIONAL SCREEN:      Movement Analysis:   Functional Test  Outcome   Double Leg Squat WNL    Single Leg Step Down Trendelenburg sign bilaterally; pain limited patient's ability to perform activity    Overhead movement  Over activation upper trap and scapula winging right shoulder      Treatment     Gym/Equipment Access: increase secondary to verbal cues   Time to Complete Exercises: Increase secondary to verbal/tactile cues     [place] received therapeutic exercises to develop strength, endurance, ROM, flexibility, posture, and core stabilization for 15 minutes including:  UBE/ Bike  7 minutes    Standing Rows x 3x10 7.5 lb cable    Prone Ws x 3x8   Standing ERs x  3x8 12.5 lb cable machine                            Cyril received the following manual therapy techniques: Joint mobilizations, Manual traction, Myofacial release, Manual Lymphatic Drainage, Soft tissue Mobilization, and Friction Massage were applied to the: bilateral upper traps for 30 minutes, including:  Soft tissue x Upper traps/ supraspinatus/ QLs    Joint Mobs x Cervical side bend    FDN x Upper Trap                                 Cyril participated in neuromuscular re-education activities to improve: Balance, Coordination, Kinesthetic, Proprioception, and Posture for 15 minutes. The following activities were included:  Wall [place]      Upper trap stretch  x 2 mins B   Chin Tucks      Open Books  x 2x10 (B)   Glute bridges   30x   Bosu ball push ups  5x2 uneven surface   [place]ian press  3x8 5lbs cable machine    Standing Swimmers  3x8 (B)   Sandbag swings   2x10 1/2 kneeling    D1/D2 ext x 2x10 7.5lb cable machine    Home Exercises and Patient Education Provided     Education/Self-Care provided: (included in treatment) minutes   Patient educated on the impairments noted above and the effects of physical therapy intervention to improve overall condition and QOL.   Patient was educated on all the above exercise prior/during/after for proper posture, positioning, and execution for safe performance with home exercise program.  Exercise Prescription:   12/21/2022: EVAL- Low      Written Home Exercises Provided: yes. Prefers: Electronic Copies  Exercises were reviewed and Cyril was able to demonstrate them prior to the end of the session.  Cyril demonstrated good understanding of the education provided  See EMR under Patient Instructions for exercises provided during therapy sessions.    ASSESSMENT     Cyril Carlton tolerated PT session well with minimal complaints discomfort, secondary to poor motor control and decrease muscle endurance. Objective findings are improving with measurements of ROM and functional  mobility.Therapy exercises were reviewed by revisiting exercises given from previous home exercise program while adding more dynamic stability exercises. Handouts were not issued during today's visit. Cyril demonstrated good understanding of new exercises and will continue to progress at home until next follow-up.       Cyril Is progressing well towards his goals.   Pt prognosis is Excellent.     Pt will continue to benefit from skilled outpatient physical therapy to address the deficits listed in the problem list box on initial evaluation, provide pt/family education and to maximize pt's level of independence in the home and community environment.     Pt's spiritual, cultural and educational needs considered and pt agreeable to plan of care and goals.    Anticipated barriers to physical therapy: occupation    GOALS:  SHORT TERM GOALS: 4 weeks, () 03/22/23   Recent signs and systems trend is improving in order to progress towards LTG's.  PC   Patient will be independent with HEP in order to further progress and return to maximal function.  PC    Pain rating at Worst: 5/10 in order to progress towards increased independence with activity.  PC    Patient will be able to correct postural deviations in sitting and standing, to decrease pain and promote postural awareness for injury prevention.   PC       LONG TERM GOALS: 8  and continuing weeks, () 03/22/23   Patient will return to normal ADL, recreational, and work related activities with less pain and limitation.    PC   Patient will improve AROM to stated goals in order to return to maximal functional potential.    PC   Patient will improve Strength to stated goals of appropriate musculature in order to improve functional independence.   PC    Pain Rating at Best: 1/10 to improve Quality of Life.   PC    Patient will meet predicted functional outcome (FOTO) score: TBA% to increase self-worth & perceived functional ability.  PC    Patient will have met/partially met  personal goal of: being able to 20lbs overhead without pain   PC       PC = progressing/continue  PM= partially met  DC= discontinue    PLAN     Continue Plan of Care (POC) and progress per patient tolerance. See Treatment section for exercise progression.    Shayne Brody, PT, DPT

## 2024-06-11 ENCOUNTER — OFFICE VISIT (OUTPATIENT)
Dept: INTERNAL MEDICINE | Facility: CLINIC | Age: 29
End: 2024-06-11
Payer: OTHER GOVERNMENT

## 2024-06-11 VITALS
OXYGEN SATURATION: 98 % | TEMPERATURE: 98 F | WEIGHT: 189.63 LBS | HEART RATE: 80 BPM | DIASTOLIC BLOOD PRESSURE: 80 MMHG | SYSTOLIC BLOOD PRESSURE: 132 MMHG | BODY MASS INDEX: 32.37 KG/M2 | HEIGHT: 64 IN

## 2024-06-11 DIAGNOSIS — R05.8 POST-VIRAL COUGH SYNDROME: Primary | ICD-10-CM

## 2024-06-11 PROCEDURE — 99999 PR PBB SHADOW E&M-EST. PATIENT-LVL III: CPT | Mod: PBBFAC,,, | Performed by: FAMILY MEDICINE

## 2024-06-11 PROCEDURE — 99213 OFFICE O/P EST LOW 20 MIN: CPT | Mod: PBBFAC | Performed by: FAMILY MEDICINE

## 2024-06-11 PROCEDURE — 99214 OFFICE O/P EST MOD 30 MIN: CPT | Mod: S$PBB,,, | Performed by: FAMILY MEDICINE

## 2024-06-11 RX ORDER — PREDNISONE 50 MG/1
50 TABLET ORAL DAILY
Qty: 5 TABLET | Refills: 0 | Status: SHIPPED | OUTPATIENT
Start: 2024-06-11 | End: 2024-06-16

## 2024-06-11 RX ORDER — PROMETHAZINE HYDROCHLORIDE AND DEXTROMETHORPHAN HYDROBROMIDE 6.25; 15 MG/5ML; MG/5ML
5 SYRUP ORAL EVERY 6 HOURS PRN
Qty: 180 ML | Refills: 0 | Status: SHIPPED | OUTPATIENT
Start: 2024-06-11

## 2024-06-11 RX ORDER — DOXYCYCLINE 100 MG/1
100 CAPSULE ORAL 2 TIMES DAILY
Qty: 20 CAPSULE | Refills: 0 | Status: SHIPPED | OUTPATIENT
Start: 2024-06-11 | End: 2024-06-21

## 2024-06-11 RX ORDER — ALBUTEROL SULFATE 90 UG/1
2 AEROSOL, METERED RESPIRATORY (INHALATION) EVERY 6 HOURS PRN
Qty: 18 G | Refills: 0 | Status: SHIPPED | OUTPATIENT
Start: 2024-06-11

## 2024-06-11 NOTE — PROGRESS NOTES
"Subjective:   Patient ID: Cyril Carlton is a 29 y.o. male.  Chief Complaint:  Cough    Presents for evaluation of URTI/cough symptoms   7-10 day duration   Mildly improved except for cough   No fever   No nausea vomiting diarrhea   No myalgias   No rash   No recent COVID vaccine  Reports home COVID test negative   Flu vaccine last season  No known COVID, flu, or strep exposure  Over-the-counter cough/cold medication minimally effective    Review of Systems   Constitutional:  Positive for fatigue. Negative for chills and fever.   HENT:  Positive for congestion, postnasal drip, rhinorrhea and sinus pressure. Negative for ear discharge, ear pain, sinus pain, sneezing, sore throat, trouble swallowing and voice change.    Respiratory:  Positive for cough. Negative for shortness of breath and wheezing.    Gastrointestinal:  Negative for abdominal pain, diarrhea, nausea and vomiting.   Musculoskeletal:  Negative for myalgias.   Skin:  Negative for rash.   Neurological:  Negative for headaches.   Hematological:  Negative for adenopathy.     Objective:   /80 (BP Location: Left arm, Patient Position: Sitting, BP Method: Large (Manual))   Pulse 80   Temp 97.8 °F (36.6 °C) (Tympanic)   Ht 5' 4" (1.626 m)   Wt 86 kg (189 lb 9.5 oz)   SpO2 98%   BMI 32.54 kg/m²     Physical Exam  Vitals and nursing note reviewed.   Constitutional:       Appearance: Normal appearance. He is well-developed and normal weight.   HENT:      Right Ear: Hearing, tympanic membrane and ear canal normal.      Left Ear: Hearing, tympanic membrane and ear canal normal.      Nose: Mucosal edema, congestion and rhinorrhea present.      Right Turbinates: Swollen. Not enlarged.      Left Turbinates: Swollen. Not enlarged.      Right Sinus: No maxillary sinus tenderness or frontal sinus tenderness.      Left Sinus: No maxillary sinus tenderness or frontal sinus tenderness.      Mouth/Throat:      Pharynx: Oropharynx is clear. Uvula midline. No " pharyngeal swelling, oropharyngeal exudate or posterior oropharyngeal erythema.   Eyes:      General:         Right eye: No discharge.         Left eye: No discharge.      Conjunctiva/sclera: Conjunctivae normal.      Right eye: Right conjunctiva is not injected.      Left eye: Left conjunctiva is not injected.   Cardiovascular:      Rate and Rhythm: Normal rate and regular rhythm.      Heart sounds: Normal heart sounds.   Pulmonary:      Effort: Pulmonary effort is normal.      Breath sounds: Wheezing present.   Abdominal:      General: There is no distension.      Palpations: Abdomen is soft.      Tenderness: There is no abdominal tenderness.   Musculoskeletal:      Right lower leg: No edema.      Left lower leg: No edema.   Lymphadenopathy:      Cervical: No cervical adenopathy.   Skin:     Findings: No rash.       Assessment:       ICD-10-CM ICD-9-CM   1. Post-viral cough syndrome  R05.8 786.2     Plan:   Post-viral cough syndrome  -     albuterol (PROVENTIL/VENTOLIN HFA) 90 mcg/actuation inhaler; Inhale 2 puffs into the lungs every 6 (six) hours as needed for Wheezing or Shortness of Breath (or Cough).  Dispense: 18 g; Refill: 0  -     promethazine-dextromethorphan (PROMETHAZINE-DM) 6.25-15 mg/5 mL Syrp; Take 5 mLs by mouth every 6 (six) hours as needed (Cough).  Dispense: 180 mL; Refill: 0  -     predniSONE (DELTASONE) 50 MG Tab; Take 1 tablet (50 mg total) by mouth once daily. for 5 days  Dispense: 5 tablet; Refill: 0  -     doxycycline (VIBRAMYCIN) 100 MG Cap; Take 1 capsule (100 mg total) by mouth 2 (two) times daily. for 10 days  Dispense: 20 capsule; Refill: 0    Patient education/handout on post viral cough syndrome.    Discussed potential 6 week duration for complete resolution.    Prednisone mg daily for 5 days prescribed   Phenergan DM as needed for cough, sore throat, nasal congestion   Albuterol inhaler as needed for cough, wheezing, shortness breath   If no significant improvement despite above  treatment or symptoms persists greater than 14 days, filled doxycycline prescription provided in office today  Return to clinic 3 months for annual physical exam or sooner as needed

## 2024-06-14 ENCOUNTER — CLINICAL SUPPORT (OUTPATIENT)
Dept: REHABILITATION | Facility: HOSPITAL | Age: 29
End: 2024-06-14
Payer: OTHER GOVERNMENT

## 2024-06-14 DIAGNOSIS — M54.2 CHRONIC NECK AND BACK PAIN: Primary | ICD-10-CM

## 2024-06-14 DIAGNOSIS — R53.1 DECREASED RANGE OF MOTION WITH DECREASED STRENGTH: ICD-10-CM

## 2024-06-14 DIAGNOSIS — M54.9 CHRONIC NECK AND BACK PAIN: Primary | ICD-10-CM

## 2024-06-14 DIAGNOSIS — G89.29 CHRONIC NECK AND BACK PAIN: Primary | ICD-10-CM

## 2024-06-14 DIAGNOSIS — M25.60 DECREASED RANGE OF MOTION WITH DECREASED STRENGTH: ICD-10-CM

## 2024-06-14 PROCEDURE — 97140 MANUAL THERAPY 1/> REGIONS: CPT

## 2024-06-14 PROCEDURE — 97112 NEUROMUSCULAR REEDUCATION: CPT

## 2024-06-14 PROCEDURE — 97110 THERAPEUTIC EXERCISES: CPT

## 2024-06-14 NOTE — PROGRESS NOTES
[facility]  Physical Therapy Treatment Note + Updated Plan of Care      Name: Cyril Carlton  Clinic Number: [id number]    Therapy Diagnosis:   Encounter Diagnoses   Name Primary?    Chronic neck and back pain Yes    Decreased range of motion with decreased strength      Physician: Lon Thomas MD  Visit Date: 6/14/2024  Physician Orders: PT Eval and Treat  Medical Diagnosis from Referral: Neck Pain   Evaluation Date: 12/21/2022  Authorization Period Expiration: 12/31/22  Plan of Care Expiration: 07/14/2024                          Progress Update: 6/14/2024                        FOTO: 0 / 3    Visit # / Visits authorized: 8 / 23(+29 visits)                          PRECAUTIONS: Standard Precautions      Time In: 1005  Time Out: 1100  Total Billable Time: 55 minutes    SUBJECTIVE     Pt reports: tightness in right shoulder region and low back .   Compliance with Hep: Daily  Response to previous treatment: no adverse reactions to treatment/updated HEP  Functional change: No Change    Pain: 4/10   Worst: 7/10  Location: Right Shoulder   Pain Control: rest and stretching   Aggravating factors: overhead movements     OBJECTIVE     Objective Measures updated at progress report unless specified otherwise.  (Measurement performed 06/14/24)    FUNCTIONAL SCREEN:      Movement Analysis:   Functional Test  Outcome   Double Leg Squat Slight lean to right side   Single Leg Step Down Trendelenburg sign bilaterally; pain limited patient's ability to perform activity    Overhead movement  Over activation upper trap and scapula winging right   shoulder   Glute Bridge Lack of glute activation on left side      Treatment     Gym/Equipment Access: increase secondary to verbal cues   Time to Complete Exercises: Increase secondary to verbal/tactile cues     [place] received therapeutic exercises to develop strength, endurance, ROM, flexibility, posture, and core stabilization for 10 minutes  Pocket flushed with antibiotic solution. including:  UBE/ Bike  7 minutes    Standing Rows  3x10 7.5 lb cable    Prone Ws x 3x8   Standing ERs x 3x8 12.5 lb cable machine                            Cyril received the following manual therapy techniques: Joint mobilizations, Manual traction, Myofacial release, Manual Lymphatic Drainage, Soft tissue Mobilization, and Friction Massage were applied to the: bilateral upper traps for 20 minutes, including:  Soft tissue x Upper traps/ supraspinatus/ QLs    Joint Mobs x Cervical side bend    FDN x Upper Trap                                 Cyril participated in neuromuscular re-education activities to improve: Balance, Coordination, Kinesthetic, Proprioception, and Posture for 25 minutes. The following activities were included:  Wall [place]      Upper trap stretch  x 2 mins B   Chin Tucks      Open Books  x 2x10 (B)   Glute bridges  x 30x   Bosu ball push ups  5x2 uneven surface   Alt superman x 5x bilaterally (opposite hand to leg)   Rui press  3x8 5lbs cable machine    Standing Swimmers  3x8 (B)   Sandbag swings   2x10 1/2 kneeling    D1/D2 ext x 2x10 7.5lb cable machine    Home Exercises and Patient Education Provided     Education/Self-Care provided: (included in treatment) minutes   Patient educated on the impairments noted above and the effects of physical therapy intervention to improve overall condition and QOL.   Patient was educated on all the above exercise prior/during/after for proper posture, positioning, and execution for safe performance with home exercise program.  Exercise Prescription:   12/21/2022: EVAL- Low      Written Home Exercises Provided: yes. Prefers: Electronic Copies  Exercises were reviewed and Cyril was able to demonstrate them prior to the end of the session.  Cyril demonstrated good understanding of the education provided  See EMR under Patient Instructions for exercises provided during therapy sessions.    ASSESSMENT     Cyril Carlton tolerated PT session well with minimal  complaints discomfort, secondary to poor motor control and decrease muscle endurance. Objective findings are improving with measurements of ROM and functional mobility.Therapy exercises were reviewed by revisiting exercises given from previous home exercise program while adding more dynamic stability exercises. Handouts were not issued during today's visit. Cyril demonstrated good understanding of new exercises and will continue to progress at home until next follow-up.       Cyril Is progressing well towards his goals.   Pt prognosis is Excellent.     Pt will continue to benefit from skilled outpatient physical therapy to address the deficits listed in the problem list box on initial evaluation, provide pt/family education and to maximize pt's level of independence in the home and community environment.     Pt's spiritual, cultural and educational needs considered and pt agreeable to plan of care and goals.    Anticipated barriers to physical therapy: occupation    GOALS:  SHORT TERM GOALS: 4 weeks, () 03/22/23   Recent signs and systems trend is improving in order to progress towards LTG's.  PC   Patient will be independent with HEP in order to further progress and return to maximal function.  PC    Pain rating at Worst: 5/10 in order to progress towards increased independence with activity.  PC    Patient will be able to correct postural deviations in sitting and standing, to decrease pain and promote postural awareness for injury prevention.   PC       LONG TERM GOALS: 8  and continuing weeks, () 03/22/23   Patient will return to normal ADL, recreational, and work related activities with less pain and limitation.    PC   Patient will improve AROM to stated goals in order to return to maximal functional potential.    PC   Patient will improve Strength to stated goals of appropriate musculature in order to improve functional independence.   PC    Pain Rating at Best: 1/10 to improve Quality of Life.   PC     Patient will meet predicted functional outcome (FOTO) score: TBA% to increase self-worth & perceived functional ability.  PC    Patient will have met/partially met personal goal of: being able to 20lbs overhead without pain   PC       PC = progressing/continue  PM= partially met  DC= discontinue    PLAN     Continue Plan of Care (POC) and progress per patient tolerance. See Treatment section for exercise progression.    Shayne Brody, PT, DPT

## 2024-06-28 ENCOUNTER — CLINICAL SUPPORT (OUTPATIENT)
Dept: REHABILITATION | Facility: HOSPITAL | Age: 29
End: 2024-06-28
Payer: OTHER GOVERNMENT

## 2024-06-28 DIAGNOSIS — G89.29 CHRONIC NECK AND BACK PAIN: Primary | ICD-10-CM

## 2024-06-28 DIAGNOSIS — M54.9 CHRONIC NECK AND BACK PAIN: Primary | ICD-10-CM

## 2024-06-28 DIAGNOSIS — M54.2 CHRONIC NECK AND BACK PAIN: Primary | ICD-10-CM

## 2024-06-28 DIAGNOSIS — M25.60 DECREASED RANGE OF MOTION WITH DECREASED STRENGTH: ICD-10-CM

## 2024-06-28 DIAGNOSIS — R53.1 DECREASED RANGE OF MOTION WITH DECREASED STRENGTH: ICD-10-CM

## 2024-06-28 PROCEDURE — 97140 MANUAL THERAPY 1/> REGIONS: CPT

## 2024-06-28 PROCEDURE — 97110 THERAPEUTIC EXERCISES: CPT

## 2024-06-28 PROCEDURE — 97112 NEUROMUSCULAR REEDUCATION: CPT

## 2024-06-28 NOTE — PROGRESS NOTES
[facility]  Physical Therapy Treatment Note + Updated Plan of Care      Name: Cyril Carlton  Clinic Number: [id number]    Therapy Diagnosis:   Encounter Diagnoses   Name Primary?    Chronic neck and back pain Yes    Decreased range of motion with decreased strength      Physician: Lon Thomas MD  Visit Date: 6/28/2024  Physician Orders: PT Eval and Treat  Medical Diagnosis from Referral: Neck Pain   Evaluation Date: 12/21/2022  Authorization Period Expiration: 12/31/22  Plan of Care Expiration: 07/14/2024                          Progress Update: 6/14/2024                        FOTO: 0 / 3    Visit # / Visits authorized: 9 / 23(+29 visits)                          PRECAUTIONS: Standard Precautions      Time In: 1130  Time Out: 1230  Total Billable Time: 55 minutes    SUBJECTIVE     Pt reports: tightness in right shoulder region and low back .   Compliance with Hep: Daily  Response to previous treatment: no adverse reactions to treatment/updated HEP  Functional change: No Change    Pain: 4/10   Worst: 7/10  Location: Right Shoulder   Pain Control: rest and stretching   Aggravating factors: overhead movements     OBJECTIVE     Objective Measures updated at progress report unless specified otherwise.  (Measurement performed 06/14/24)    FUNCTIONAL SCREEN:      Movement Analysis:   Functional Test  Outcome   Double Leg Squat Slight lean to right side   Single Leg Step Down Trendelenburg sign bilaterally; pain limited patient's ability to perform activity    Overhead movement  Over activation upper trap and scapula winging right   shoulder   Glute Bridge Lack of glute activation on left side      Treatment     Gym/Equipment Access: increase secondary to verbal cues   Time to Complete Exercises: Increase secondary to verbal/tactile cues     [place] received therapeutic exercises to develop strength, endurance, ROM, flexibility, posture, and core stabilization for 15 minutes  including:  UBE/ Bike  7 minutes    Standing Rows x 3x10 7.5 lb cable    Prone Ws x 3x8   Standing ERs x 3x8 12.5 lb cable machine                            Cyril received the following manual therapy techniques: Joint mobilizations, Manual traction, Myofacial release, Manual Lymphatic Drainage, Soft tissue Mobilization, and Friction Massage were applied to the: bilateral upper traps for 30 minutes, including:  Soft tissue x Upper traps/ supraspinatus/ QLs    Joint Mobs x Cervical side bend    FDN x Upper Trap                                 Cyril participated in neuromuscular re-education activities to improve: Balance, Coordination, Kinesthetic, Proprioception, and Posture for 10 minutes. The following activities were included:  Wall [place]      Upper trap stretch  x 2 mins B   Chin Tucks      Open Books   2x10 (B)   Glute bridges   30x   Bosu ball push ups  5x2 uneven surface   Alt superman x 5x bilaterally (opposite hand to leg)   Rui press  3x8 5lbs cable machine    Standing Swimmers  3x8 (B)   Sandbag swings   2x10 1/2 kneeling    D1/D2 ext  2x10 7.5lb cable machine    Home Exercises and Patient Education Provided     Education/Self-Care provided: (included in treatment) minutes   Patient educated on the impairments noted above and the effects of physical therapy intervention to improve overall condition and QOL.   Patient was educated on all the above exercise prior/during/after for proper posture, positioning, and execution for safe performance with home exercise program.  Exercise Prescription:   12/21/2022: EVAL- Low      Written Home Exercises Provided: yes. Prefers: Electronic Copies  Exercises were reviewed and Cyril was able to demonstrate them prior to the end of the session.  Cyril demonstrated good understanding of the education provided  See EMR under Patient Instructions for exercises provided during therapy sessions.    ASSESSMENT     Cyril Carlton tolerated PT session well with minimal  complaints discomfort, secondary to poor motor control and decrease muscle endurance. Objective findings are improving with measurements of ROM and functional mobility.Therapy exercises were reviewed by revisiting exercises given from previous home exercise program while adding more dynamic stability exercises. Handouts were not issued during today's visit. Cyril demonstrated good understanding of new exercises and will continue to progress at home until next follow-up.       Cyril Is progressing well towards his goals.   Pt prognosis is Excellent.     Pt will continue to benefit from skilled outpatient physical therapy to address the deficits listed in the problem list box on initial evaluation, provide pt/family education and to maximize pt's level of independence in the home and community environment.     Pt's spiritual, cultural and educational needs considered and pt agreeable to plan of care and goals.    Anticipated barriers to physical therapy: occupation    GOALS:  SHORT TERM GOALS: 4 weeks, () 03/22/23   Recent signs and systems trend is improving in order to progress towards LTG's.  PC   Patient will be independent with HEP in order to further progress and return to maximal function.  PC    Pain rating at Worst: 5/10 in order to progress towards increased independence with activity.  PC    Patient will be able to correct postural deviations in sitting and standing, to decrease pain and promote postural awareness for injury prevention.   PC       LONG TERM GOALS: 8  and continuing weeks, () 03/22/23   Patient will return to normal ADL, recreational, and work related activities with less pain and limitation.    PC   Patient will improve AROM to stated goals in order to return to maximal functional potential.    PC   Patient will improve Strength to stated goals of appropriate musculature in order to improve functional independence.   PC    Pain Rating at Best: 1/10 to improve Quality of Life.   PC     Patient will meet predicted functional outcome (FOTO) score: TBA% to increase self-worth & perceived functional ability.  PC    Patient will have met/partially met personal goal of: being able to 20lbs overhead without pain   PC       PC = progressing/continue  PM= partially met  DC= discontinue    PLAN     Continue Plan of Care (POC) and progress per patient tolerance. See Treatment section for exercise progression.    Shayne Brody, PT, DPT

## 2024-07-01 ENCOUNTER — OFFICE VISIT (OUTPATIENT)
Dept: URGENT CARE | Facility: CLINIC | Age: 29
End: 2024-07-01
Payer: OTHER GOVERNMENT

## 2024-07-01 VITALS
SYSTOLIC BLOOD PRESSURE: 116 MMHG | TEMPERATURE: 98 F | BODY MASS INDEX: 29.07 KG/M2 | RESPIRATION RATE: 14 BRPM | HEART RATE: 74 BPM | WEIGHT: 185.19 LBS | HEIGHT: 67 IN | DIASTOLIC BLOOD PRESSURE: 61 MMHG | OXYGEN SATURATION: 98 %

## 2024-07-01 DIAGNOSIS — R13.10 DYSPHAGIA, UNSPECIFIED TYPE: Primary | ICD-10-CM

## 2024-07-01 DIAGNOSIS — R13.10 PAIN WITH SWALLOWING: ICD-10-CM

## 2024-07-01 RX ORDER — LIDOCAINE HYDROCHLORIDE 20 MG/ML
10 SOLUTION OROPHARYNGEAL
Status: COMPLETED | OUTPATIENT
Start: 2024-07-01 | End: 2024-07-01

## 2024-07-01 RX ORDER — OMEPRAZOLE 20 MG/1
20 CAPSULE, DELAYED RELEASE ORAL DAILY
Qty: 90 CAPSULE | Refills: 3 | Status: SHIPPED | OUTPATIENT
Start: 2024-07-01 | End: 2025-07-01

## 2024-07-01 RX ORDER — ALUMINUM HYDROXIDE, MAGNESIUM HYDROXIDE, AND SIMETHICONE 1200; 120; 1200 MG/30ML; MG/30ML; MG/30ML
30 SUSPENSION ORAL
Status: COMPLETED | OUTPATIENT
Start: 2024-07-01 | End: 2024-07-01

## 2024-07-01 RX ADMIN — ALUMINUM HYDROXIDE, MAGNESIUM HYDROXIDE, AND SIMETHICONE 30 ML: 1200; 120; 1200 SUSPENSION ORAL at 04:07

## 2024-07-01 RX ADMIN — LIDOCAINE HYDROCHLORIDE 10 ML: 20 SOLUTION OROPHARYNGEAL at 04:07

## 2024-07-01 NOTE — PROGRESS NOTES
"Subjective:      Patient ID: Cyril Carlton is a 29 y.o. male.    Vitals:  height is 5' 7.32" (1.71 m) and weight is 84 kg (185 lb 3 oz). His oral temperature is 98.1 °F (36.7 °C). His blood pressure is 116/61 and his pulse is 74. His respiration is 14 and oxygen saturation is 98%.     Chief Complaint: No chief complaint on file.    29 year old male presents for evaluation of chest pain with swallowing x 2 days. Woke up one morning at 2 am with symptoms. Complains of mild chest pain at rest intensifies with swallowing. Decreased po intake due to pain, bit appetite is unchanged. No medications taken for relief. Recent Internal Medicine Consult 6/11, DX: Post viral cough syndrome RX: Doxycycline course in progress. Not sure If a capsule got stuck, no known triggers    Chest Pain   This is a new problem. The current episode started in the past 7 days (ongoing 2 days). The onset quality is sudden. The problem has been gradually worsening. The pain is at a severity of 4/10. The quality of the pain is described as sharp and stabbing. Pertinent negatives include no abdominal pain, cough, diaphoresis, fever, nausea, palpitations, shortness of breath, syncope or vomiting. Treatments tried: hot  coffee and cold water. The treatment provided no relief.       Constitution: Negative. Negative for appetite change, chills, sweating, fatigue and fever.   HENT: Negative.  Negative for sore throat.    Neck: neck negative.   Cardiovascular:  Positive for chest pain (esophageal). Negative for palpitations, sob on exertion and passing out.   Eyes: Negative.    Respiratory:  Positive for chest tightness (esophageal). Negative for cough, shortness of breath and wheezing.    Gastrointestinal:  Negative for abdominal pain, abdominal bloating, nausea, vomiting, diarrhea and heartburn.   Endocrine: negative.   Genitourinary: Negative.    Musculoskeletal: Negative.    Skin: Negative.    Allergic/Immunologic: Negative.  Negative for sneezing. "   Neurological: Negative.    Hematologic/Lymphatic: Negative.    Psychiatric/Behavioral: Negative.        Objective:     Physical Exam   Constitutional: He is oriented to person, place, and time. He appears well-developed. He is cooperative.  Non-toxic appearance. He does not appear ill. No distress. awake  HENT:   Head: Normocephalic and atraumatic.   Ears:   Right Ear: Hearing, tympanic membrane, external ear and ear canal normal.   Left Ear: Hearing, tympanic membrane, external ear and ear canal normal.   Nose: Nose normal. No mucosal edema, rhinorrhea or nasal deformity. No epistaxis. Right sinus exhibits no maxillary sinus tenderness and no frontal sinus tenderness. Left sinus exhibits no maxillary sinus tenderness and no frontal sinus tenderness.   Mouth/Throat: Uvula is midline, oropharynx is clear and moist and mucous membranes are normal. Mucous membranes are moist. No trismus in the jaw. Normal dentition. No uvula swelling. No oropharyngeal exudate, posterior oropharyngeal edema, posterior oropharyngeal erythema or tonsillar abscesses. Tonsils are 0 on the right. Tonsils are 0 on the left. No tonsillar exudate.   Eyes: Conjunctivae and lids are normal. Pupils are equal, round, and reactive to light. Right eye exhibits no discharge. Left eye exhibits no discharge. No scleral icterus. Extraocular movement intact   Neck: Trachea normal and phonation normal. Neck supple. No edema present. No erythema present. No neck rigidity present.   Cardiovascular: Normal rate, regular rhythm, normal heart sounds and normal pulses.   Pulmonary/Chest: Effort normal and breath sounds normal. No stridor. No respiratory distress. He has no decreased breath sounds. He has no wheezes. He has no rhonchi. He has no rales. He exhibits no mass, no tenderness, no bony tenderness, no laceration, no crepitus, no edema, no deformity, no swelling and no retraction.       Abdominal: Normal appearance and bowel sounds are normal. He  exhibits no distension and no mass. Soft. flat abdomen There is no abdominal tenderness. There is no rebound, no guarding, no tenderness at McBurney's point, negative Johnson's sign, no left CVA tenderness, negative Rovsing's sign, negative psoas sign and no right CVA tenderness. No hernia.   Musculoskeletal: Normal range of motion.         General: No deformity. Normal range of motion.   Neurological: no focal deficit. He is alert and oriented to person, place, and time. He exhibits normal muscle tone. Coordination normal.   Skin: Skin is warm, dry, intact, not diaphoretic and not pale.   Psychiatric: His speech is normal and behavior is normal. Mood, judgment and thought content normal.   Nursing note and vitals reviewed.      Assessment:     1. Dysphagia, unspecified type    2. Pain with swallowing        Plan:       Dysphagia, unspecified type  -     LIDOcaine viscous HCl 2% oral solution 10 mL  -     aluminum-magnesium hydroxide-simethicone 200-200-20 mg/5 mL suspension 30 mL  -     omeprazole (PRILOSEC) 20 MG capsule; Take 1 capsule (20 mg total) by mouth once daily.  Dispense: 90 capsule; Refill: 3    Pain with swallowing  -     LIDOcaine viscous HCl 2% oral solution 10 mL  -     aluminum-magnesium hydroxide-simethicone 200-200-20 mg/5 mL suspension 30 mL  -     omeprazole (PRILOSEC) 20 MG capsule; Take 1 capsule (20 mg total) by mouth once daily.  Dispense: 90 capsule; Refill: 3        Patient presents for evaluation of dysphagia. Decision to administer GI cocktail, patient reports some improvement post administration. Plan is to treat empirically for GERD and follow up with Gastro for further evaluation if symptoms persist. Discussed with patient who verbalizes understanding.      Patient Instructions   Soft diet: smoothies, soups, mashed potatoes  Avoid heavy meals  Omeprazole daily as directed  Follow up with PCP in 1 week if symptoms worsen/persist  Follow up as needed/with worsening

## 2024-07-01 NOTE — PATIENT INSTRUCTIONS
Soft diet: smoothies, soups, mashed potatoes  Avoid heavy meals  Omeprazole daily as directed  Follow up with PCP in 1 week if symptoms worsen/persist  Follow up as needed/with worsening

## 2024-07-02 ENCOUNTER — OFFICE VISIT (OUTPATIENT)
Dept: INTERNAL MEDICINE | Facility: CLINIC | Age: 29
End: 2024-07-02
Payer: OTHER GOVERNMENT

## 2024-07-02 VITALS
TEMPERATURE: 96 F | SYSTOLIC BLOOD PRESSURE: 122 MMHG | OXYGEN SATURATION: 98 % | DIASTOLIC BLOOD PRESSURE: 86 MMHG | HEART RATE: 85 BPM | RESPIRATION RATE: 16 BRPM | WEIGHT: 187.38 LBS | BODY MASS INDEX: 29.41 KG/M2 | HEIGHT: 67 IN

## 2024-07-02 DIAGNOSIS — R13.10 DYSPHAGIA, UNSPECIFIED TYPE: Primary | ICD-10-CM

## 2024-07-02 DIAGNOSIS — J02.9 PHARYNGITIS, UNSPECIFIED ETIOLOGY: ICD-10-CM

## 2024-07-02 LAB — GROUP A STREP, MOLECULAR: NEGATIVE

## 2024-07-02 PROCEDURE — 99214 OFFICE O/P EST MOD 30 MIN: CPT | Mod: S$PBB,,,

## 2024-07-02 PROCEDURE — 99999 PR PBB SHADOW E&M-EST. PATIENT-LVL IV: CPT | Mod: PBBFAC,,,

## 2024-07-02 PROCEDURE — 87651 STREP A DNA AMP PROBE: CPT

## 2024-07-02 PROCEDURE — 99214 OFFICE O/P EST MOD 30 MIN: CPT | Mod: PBBFAC

## 2024-07-02 NOTE — PROGRESS NOTES
Cyril Carlton  07/02/2024  [id number]    Lon Thomas MD  Patient Care Team:  Lon Thomas MD as PCP - General (Family Medicine)          Visit Type:an urgent visit for a new problem    Chief Complaint:  Chief Complaint   Patient presents with    Sore Throat       History of Present Illness:    For the last 3 days, Patient feels like something is stuck in his throat  More difficult to swallow when his throat feels dry  Went to  and was given Prilosec  He picked it up yesterday. He has only used it once  Felt that it did not help.    No fever  He does have pain when he swallows   At times he has waken up in the middle of the night with throat pain   No SOB or wheezing      History:  Past Medical History:   Diagnosis Date    Patient denies medical problems      Past Surgical History:   Procedure Laterality Date    NO PAST SURGERIES       Family History   Problem Relation Name Age of Onset    No Known Problems Mother      No Known Problems Father      No Known Problems Sister      Diabetes Maternal Grandmother      Diabetes Paternal Grandmother      Diabetes Paternal Grandfather       Social History     Socioeconomic History    Marital status: Single   Tobacco Use    Smoking status: Never    Smokeless tobacco: Never   Substance and Sexual Activity    Alcohol use: Yes     Alcohol/week: 2.0 standard drinks of alcohol     Types: 2 Cans of beer per week    Drug use: Never    Sexual activity: Yes     Partners: Female     Social Determinants of Health     Financial Resource Strain: Low Risk  (11/27/2023)    Overall Financial Resource Strain (CARDIA)     Difficulty of Paying Living Expenses: Not hard at all   Food Insecurity: No Food Insecurity (11/27/2023)    Hunger Vital Sign     Worried About Running Out of Food in the Last Year: Never true     Ran Out of Food in the Last Year: Never true   Transportation Needs: No Transportation Needs (11/27/2023)    PRAPARE - Transportation     Lack of Transportation  (Medical): No     Lack of Transportation (Non-Medical): No   Physical Activity: Sufficiently Active (11/27/2023)    Exercise Vital Sign     Days of Exercise per Week: 5 days     Minutes of Exercise per Session: 60 min   Stress: No Stress Concern Present (11/27/2023)    [employer] - Occupational Stress Questionnaire     Feeling of Stress : Not at all   Housing Stability: Low Risk  (11/27/2023)    Housing Stability Vital Sign     Unable to Pay for Housing in the Last Year: No     Number of Places Lived in the Last Year: 1     Unstable Housing in the Last Year: No     Patient Active Problem List   Diagnosis    Chronic neck and back pain    Stress and adjustment reaction    Decreased range of motion with decreased strength     Review of patient's allergies indicates:  No Known Allergies    The following were reviewed at this visit: active problem list, medication list, allergies, family history, social history, and health maintenance.    Medications:  Current Outpatient Medications on File Prior to Visit   Medication Sig Dispense Refill    omeprazole (PRILOSEC) 20 MG capsule Take 1 capsule (20 mg total) by mouth once daily. 90 capsule 3    albuterol (PROVENTIL/VENTOLIN HFA) 90 mcg/actuation inhaler Inhale 2 puffs into the lungs every 6 (six) hours as needed for Wheezing or Shortness of Breath (or Cough). (Patient not taking: Reported on 7/1/2024) 18 g 0    promethazine-dextromethorphan (PROMETHAZINE-DM) 6.25-15 mg/5 mL Syrp Take 5 mLs by mouth every 6 (six) hours as needed (Cough). (Patient not taking: Reported on 7/1/2024) 180 mL 0     Current Facility-Administered Medications on File Prior to Visit   Medication Dose Route Frequency Provider Last Rate Last Admin    [COMPLETED] aluminum-magnesium hydroxide-simethicone 200-200-20 mg/5 mL suspension 30 mL  30 mL Oral 1 time in Clinic/HOD    30 mL at 07/01/24 1616    [COMPLETED] LIDOcaine viscous HCl 2% oral solution 10 mL  10 mL Mucous  Membrane 1 time in Clinic/HOD    10 mL at 07/01/24 1617       Medications have been reviewed and reconciled with patient at this visit.  Barriers to medications reviewed with patient.    Adverse reactions to current medications reviewed with patient..    Over the counter medications reviewed and reconciled with patient.    Exam:  Wt Readings from Last 3 Encounters:   07/02/24 85 kg (187 lb 6.3 oz)   07/01/24 84 kg (185 lb 3 oz)   06/11/24 86 kg (189 lb 9.5 oz)     Temp Readings from Last 3 Encounters:   07/02/24 96.3 °F (35.7 °C) (Tympanic)   07/01/24 98.1 °F (36.7 °C) (Oral)   06/11/24 97.8 °F (36.6 °C) (Tympanic)     BP Readings from Last 3 Encounters:   07/02/24 122/86   07/01/24 116/61   06/11/24 132/80     Pulse Readings from Last 3 Encounters:   07/02/24 85   07/01/24 74   06/11/24 80     Body mass index is 29.35 kg/m².      Review of Systems   HENT:  Positive for sore throat.    Gastrointestinal:         Dysphagia      Physical Exam  Nursing note reviewed.   HENT:      Head: Normocephalic and atraumatic.      Right Ear: Tympanic membrane normal.      Left Ear: Tympanic membrane normal.      Nose: Nose normal.      Mouth/Throat:      Mouth: Mucous membranes are moist.   Cardiovascular:      Rate and Rhythm: Normal rate and regular rhythm.      Heart sounds: Normal heart sounds.   Pulmonary:      Effort: Pulmonary effort is normal. No respiratory distress.      Breath sounds: Normal breath sounds.   Lymphadenopathy:      Head:      Right side of head: No tonsillar, preauricular or posterior auricular adenopathy.      Left side of head: No tonsillar, preauricular or posterior auricular adenopathy.      Cervical: No cervical adenopathy.   Neurological:      Mental Status: He is alert and oriented to person, place, and time.   Psychiatric:         Mood and Affect: Mood normal.         Behavior: Behavior normal.         Thought Content: Thought content normal.         Judgment: Judgment normal.         Laboratory  Reviewed ({Yes)  Lab Results   Component Value Date    WBC 6.59 08/30/2023    HGB 15.1 08/30/2023    HCT 46.4 08/30/2023     08/30/2023    CHOL 230 (H) 08/30/2023    TRIG 65 08/30/2023    HDL 90 (H) 08/30/2023    ALT 27 08/30/2023    AST 48 (H) 08/30/2023     08/30/2023    K 4.2 08/30/2023    CL 99 08/30/2023    CREATININE 1.4 08/30/2023    BUN 15 08/30/2023    CO2 25 08/30/2023    TSH 1.760 08/30/2023    HGBA1C 4.9 08/30/2023         [place] was seen today for sore throat.    Diagnoses and all orders for this visit:    Dysphagia, unspecified type  -     Ambulatory referral/consult to ENT; Future    Pharyngitis, unspecified etiology  -     Group A Strep, Molecular    Patient is very concerned  Recommended that he continues taking daily Prilosec to see if it will help with pain   Will refer to ENT for eval and recommendation          Care Plan/Goals: Reviewed    Goals    None         Follow up: No follow-ups on file.    After visit summary was printed and given to patient upon discharge today.  Patient goals and care plan are included in After Visit Summary.

## 2024-07-03 ENCOUNTER — TELEPHONE (OUTPATIENT)
Dept: INTERNAL MEDICINE | Facility: CLINIC | Age: 29
End: 2024-07-03
Payer: OTHER GOVERNMENT

## 2024-07-09 ENCOUNTER — OFFICE VISIT (OUTPATIENT)
Dept: GASTROENTEROLOGY | Facility: CLINIC | Age: 29
End: 2024-07-09
Payer: OTHER GOVERNMENT

## 2024-07-09 DIAGNOSIS — K22.4 ESOPHAGEAL SPASM: Primary | ICD-10-CM

## 2024-07-09 PROCEDURE — 99204 OFFICE O/P NEW MOD 45 MIN: CPT | Mod: 95,,, | Performed by: NURSE PRACTITIONER

## 2024-07-09 RX ORDER — HYOSCYAMINE SULFATE 0.12 MG/1
0.12 TABLET SUBLINGUAL EVERY 6 HOURS PRN
Qty: 30 TABLET | Refills: 1 | Status: SHIPPED | OUTPATIENT
Start: 2024-07-12 | End: 2024-08-11

## 2024-07-09 NOTE — PROGRESS NOTES
"     GASTROENTEROLOGY CLINIC NOTE    The patient location is: [place]  The chief complaint leading to consultation is: esophageal spasm    Visit type: audiovisual    Face to Face time with patient: 17:35  35 minutes of total time spent on the encounter, which includes face to face time and non-face to face time preparing to see the patient (eg, review of tests), Obtaining and/or reviewing separately obtained history, Documenting clinical information in the electronic or other health record, Independently interpreting results (not separately reported) and communicating results to the patient/family/caregiver, or Care coordination (not separately reported).     Each patient to whom he or she provides medical services by telemedicine is:  (1) informed of the relationship between the physician and patient and the respective role of any other health care provider with respect to management of the patient; and (2) notified that he or she may decline to receive medical services by telemedicine and may withdraw from such care at any time.      Chief Complaint: The encounter diagnosis was Esophageal spasm.  Referring provider/PCP: Lon Thomas MD    HPI  Cyril Carlton is a 29 y.o. male who is a new patient to me who presents via telemedicine encounter for suspected esophageal spasm. Symptoms first began 8 days ago when he experienced sudden sharp substernal chest pain that woke him while sleeping. Pain worsened with swallowing. He went to urgent care where GI cocktail and omperazole 20mg prescribed. Mild relief with GI cocktail. He took omeprazole for 3-4 days and did not note much of an improvement therefore he stopped taking medication. Denies difficulty swallowing but states when eating felt like food had to "pass through a lump" in his chest after swallowing. Drinking water would help food move down. Symptoms improved after four days. He is currently not experiencing any symptoms.       GLP-1s: No  NSAIDs: " No  Anticoagulation or Antiplatelet: No      History of H.pylori: no  H.pylori Treatment:  Prior Upper Endoscopy: no  Prior Colonoscopy: no  Family h/o Colon Cancer: No  Family h/o Crohn's Disease or Ulcerative Colitis: No  Family h/o Celiac Sprue: No  Abdominal Surgeries: no      Review of Systems   Constitutional:  Negative for weight loss.   HENT:  Negative for sore throat.    Eyes:  Negative for blurred vision.   Respiratory:  Negative for cough.    Cardiovascular:  Negative for chest pain.   Gastrointestinal:  Negative for abdominal pain, blood in stool, constipation, diarrhea, heartburn, melena, nausea and vomiting.   Genitourinary:  Negative for dysuria.   Musculoskeletal:  Negative for myalgias.   Skin:  Negative for rash.   Neurological:  Negative for headaches.   Endo/Heme/Allergies:  Negative for environmental allergies.   Psychiatric/Behavioral:  Negative for suicidal ideas. The patient is not nervous/anxious.        Past Medical History: has a past medical history of Patient denies medical problems.    Past Surgical History: has a past surgical history that includes No past surgeries.    Family History:family history includes Diabetes in his maternal grandmother, paternal grandfather, and paternal grandmother; No Known Problems in his father, mother, and sister.    Allergies: Review of patient's allergies indicates:  No Known Allergies    Social History: reports that he has never smoked. He has never used smokeless tobacco. He reports current alcohol use of about 2.0 standard drinks of alcohol per week. He reports that he does not use drugs.    Home medications:   Current Outpatient Medications on File Prior to Visit   Medication Sig Dispense Refill    omeprazole (PRILOSEC) 20 MG capsule Take 1 capsule (20 mg total) by mouth once daily. 90 capsule 3    [DISCONTINUED] albuterol (PROVENTIL/VENTOLIN HFA) 90 mcg/actuation inhaler Inhale 2 puffs into the lungs every 6 (six) hours as needed for Wheezing or  "Shortness of Breath (or Cough). (Patient not taking: Reported on 7/1/2024) 18 g 0    [DISCONTINUED] promethazine-dextromethorphan (PROMETHAZINE-DM) 6.25-15 mg/5 mL Syrp Take 5 mLs by mouth every 6 (six) hours as needed (Cough). (Patient not taking: Reported on 7/1/2024) 180 mL 0     No current facility-administered medications on file prior to visit.       Vital signs:  There were no vitals taken for this visit.    Physical Exam  Constitutional:       Appearance: Normal appearance. He is not ill-appearing.      Comments: Limited Physical Exam d/t Telemedicine Encounter   HENT:      Head: Normocephalic.   Pulmonary:      Effort: Pulmonary effort is normal. No respiratory distress.   Neurological:      Mental Status: He is alert and oriented to person, place, and time.   Psychiatric:         Mood and Affect: Mood normal.         Behavior: Behavior normal.         Thought Content: Thought content normal.         Judgment: Judgment normal.         Routine labs:  Lab Results   Component Value Date    WBC 6.59 08/30/2023    HGB 15.1 08/30/2023    HCT 46.4 08/30/2023    MCV 86 08/30/2023     08/30/2023     No results found for: "INR"  No results found for: "IRON", "FERRITIN", "TIBC", "FESATURATED"  Lab Results   Component Value Date     08/30/2023    K 4.2 08/30/2023    CL 99 08/30/2023    CO2 25 08/30/2023    BUN 15 08/30/2023    CREATININE 1.4 08/30/2023     Lab Results   Component Value Date    ALBUMIN 4.8 08/30/2023    ALT 27 08/30/2023    AST 48 (H) 08/30/2023    ALKPHOS 65 08/30/2023    BILITOT 2.3 (H) 08/30/2023     No results found for: "GLUCOSE"  Lab Results   Component Value Date    TSH 1.760 08/30/2023     Lab Results   Component Value Date    CALCIUM 9.8 08/30/2023       Imaging:      I have reviewed prior labs, imaging, and notes.      Assessment:  1. Esophageal spasm      Symptoms more consistent with esophageal spasm.   Denies heartburn, indigestion, reflux.     Plan:  Orders Placed This " Encounter    FL Esophagram With Barium Tablet    hyoscyamine 0.125 mg Subl    Case Request Endoscopy: EGD (ESOPHAGOGASTRODUODENOSCOPY)       Esophagram with barium tablet  Levsin PRN   EGD    Discussed following precautions with patient: Eat slowly and chew food completely.  Cut food up into small pieces. If food gets stuck and patient cannot clear food or if he has trouble breathing, go to the emergency room. Patient verbalized understanding.    Plan of care discussed with patient who is in agreement and verbalized understanding.     I have explained the planned procedures to the patient.The risks, benefits and alternatives of the procedure were also explained in detail. Patient verbalized understanding, all questions were answered. The patient agrees to proceed as planned    Follow Up: JEET Victoria, APRN,FNP-BC  [facility]    (Portions of this note were dictated using voice recognition software and may contain dictation related errors in spelling/grammar/syntax not found on text review)

## 2024-07-18 ENCOUNTER — HOSPITAL ENCOUNTER (OUTPATIENT)
Dept: RADIOLOGY | Facility: HOSPITAL | Age: 29
Discharge: HOME | End: 2024-07-18
Attending: NURSE PRACTITIONER
Payer: OTHER GOVERNMENT

## 2024-07-18 DIAGNOSIS — K22.4 ESOPHAGEAL SPASM: ICD-10-CM

## 2024-07-18 PROCEDURE — 74220 X-RAY XM ESOPHAGUS 1CNTRST: CPT | Mod: TC

## 2024-07-18 PROCEDURE — 25500020 PHARM REV CODE 255: Performed by: NURSE PRACTITIONER

## 2024-07-18 PROCEDURE — A9698 NON-RAD CONTRAST MATERIALNOC: HCPCS | Performed by: NURSE PRACTITIONER

## 2024-07-18 PROCEDURE — 74220 X-RAY XM ESOPHAGUS 1CNTRST: CPT | Mod: 26,,, | Performed by: RADIOLOGY

## 2024-07-18 RX ADMIN — BARIUM SULFATE 137 ML: 980 POWDER, FOR SUSPENSION ORAL at 11:07

## 2024-07-18 RX ADMIN — BARIUM SULFATE 355 ML: 0.6 SUSPENSION ORAL at 11:07

## 2024-07-19 ENCOUNTER — OFFICE VISIT (OUTPATIENT)
Dept: GASTROENTEROLOGY | Facility: CLINIC | Age: 29
End: 2024-07-19
Payer: OTHER GOVERNMENT

## 2024-07-19 ENCOUNTER — PATIENT MESSAGE (OUTPATIENT)
Dept: GASTROENTEROLOGY | Facility: CLINIC | Age: 29
End: 2024-07-19

## 2024-07-19 DIAGNOSIS — R10.9 ABDOMINAL DISCOMFORT: ICD-10-CM

## 2024-07-19 DIAGNOSIS — R19.8 ALTERED BOWEL FUNCTION: Primary | ICD-10-CM

## 2024-07-19 PROCEDURE — 99214 OFFICE O/P EST MOD 30 MIN: CPT | Mod: 95,,, | Performed by: NURSE PRACTITIONER

## 2024-07-19 NOTE — PROGRESS NOTES
Clinic Follow Up:  [facility] Follow Up Note    Reason for Follow Up:  The primary encounter diagnosis was Altered bowel function. A diagnosis of Abdominal discomfort was also pertinent to this visit.    PCP: Lon Thomas       The patient location is: [place]   The chief complaint leading to consultation is: altered bowel     Visit type: audiovisual    Face to Face time with patient: 15 minutes   20 minutes of total time spent on the encounter, which includes face to face time and non-face to face time preparing to see the patient (eg, review of tests), Obtaining and/or reviewing separately obtained history, Documenting clinical information in the electronic or other health record, Independently interpreting results (not separately reported) and communicating results to the patient/family/caregiver, or Care coordination (not separately reported).         Each patient to whom he or she provides medical services by telemedicine is:  (1) informed of the relationship between the physician and patient and the respective role of any other health care provider with respect to management of the patient; and (2) notified that he or she may decline to receive medical services by telemedicine and may withdraw from such care at any time.    Notes:       HPI:  This is a 29 y.o. male here for follow up.   Was recently seen by GI for esophageal spasms. Was given Levsin PRN  He does also have altered bowels. He reports abdominal discomfort of the lower abdomen. Will have about 1-2 formed stools per day. When having abdominal discomfort, can have more diarrhea. This occurs about once a month and will last for about 1 days before resolving.     Review of Systems   Constitutional:  Negative for activity change and appetite change.        As per interval history above   Respiratory:  Negative for cough and shortness of breath.    Cardiovascular:  Negative for chest pain.   Gastrointestinal:  Positive for  abdominal pain and diarrhea. Negative for anal bleeding, blood in stool, constipation, nausea, rectal pain and vomiting.   Skin:  Negative for color change and rash.       Medical History:  Past Medical History:   Diagnosis Date    Patient denies medical problems        Surgical History:   Past Surgical History:   Procedure Laterality Date    NO PAST SURGERIES         Family History:   Family History   Problem Relation Name Age of Onset    No Known Problems Mother      No Known Problems Father      No Known Problems Sister      Diabetes Maternal Grandmother      Diabetes Paternal Grandmother      Diabetes Paternal Grandfather         Social History:   Social History     Tobacco Use    Smoking status: Never    Smokeless tobacco: Never   Substance Use Topics    Alcohol use: Yes     Alcohol/week: 2.0 standard drinks of alcohol     Types: 2 Cans of beer per week    Drug use: Never       Allergies: Review of patient's allergies indicates:  No Known Allergies    Home Medications:  Current Outpatient Medications on File Prior to Visit   Medication Sig Dispense Refill    hyoscyamine 0.125 mg Subl Place 1 tablet (0.125 mg total) under the tongue every 6 (six) hours as needed (esophageal spasm). 30 tablet 1    omeprazole (PRILOSEC) 20 MG capsule Take 1 capsule (20 mg total) by mouth once daily. 90 capsule 3     No current facility-administered medications on file prior to visit.       There were no vitals taken for this visit.  There is no height or weight on file to calculate BMI.  Physical Exam  Constitutional:       General: He is not in acute distress.  HENT:      Head: Normocephalic.   Neurological:      General: No focal deficit present.      Mental Status: He is alert.   Psychiatric:         Mood and Affect: Mood normal.         Judgment: Judgment normal.         Labs: Pertinent labs reviewed.  CRC Screening:     Assessment:   1. Altered bowel function    2. Abdominal discomfort        Recommendations:   - metamucil  -  Levsin PRN     Altered bowel function    Abdominal discomfort    Other orders  -     psyllium husk 6 gram/6 gram Powd; Take 1 Dose by mouth 2 (two) times a day.  Dispense: 450 g; Refill: 11        Return to Clinic:  No follow-ups on file.    Thank you for the opportunity to participate in the care of this patient.  HARRY Weber